# Patient Record
Sex: MALE | Race: WHITE | Employment: FULL TIME | URBAN - METROPOLITAN AREA
[De-identification: names, ages, dates, MRNs, and addresses within clinical notes are randomized per-mention and may not be internally consistent; named-entity substitution may affect disease eponyms.]

---

## 2018-06-05 ENCOUNTER — OFFICE VISIT (OUTPATIENT)
Dept: FAMILY MEDICINE CLINIC | Age: 43
End: 2018-06-05

## 2018-06-05 VITALS
OXYGEN SATURATION: 97 % | SYSTOLIC BLOOD PRESSURE: 144 MMHG | TEMPERATURE: 98.4 F | RESPIRATION RATE: 19 BRPM | HEART RATE: 71 BPM | WEIGHT: 253.9 LBS | HEIGHT: 77 IN | BODY MASS INDEX: 29.98 KG/M2 | DIASTOLIC BLOOD PRESSURE: 101 MMHG

## 2018-06-05 DIAGNOSIS — F43.23 ADJUSTMENT REACTION WITH ANXIETY AND DEPRESSION: Primary | ICD-10-CM

## 2018-06-05 DIAGNOSIS — E55.9 VITAMIN D DEFICIENCY: ICD-10-CM

## 2018-06-05 DIAGNOSIS — C80.1 CANCER (HCC): ICD-10-CM

## 2018-06-05 DIAGNOSIS — I10 ESSENTIAL HYPERTENSION: ICD-10-CM

## 2018-06-05 PROBLEM — K21.9 GERD (GASTROESOPHAGEAL REFLUX DISEASE): Status: ACTIVE | Noted: 2018-06-05

## 2018-06-05 PROBLEM — E78.00 HYPERCHOLESTEROLEMIA: Status: ACTIVE | Noted: 2018-06-05

## 2018-06-05 RX ORDER — BUPROPION HYDROCHLORIDE 150 MG/1
150 TABLET, EXTENDED RELEASE ORAL 2 TIMES DAILY
Qty: 60 TAB | Refills: 0 | Status: SHIPPED | OUTPATIENT
Start: 2018-06-05 | End: 2018-07-13 | Stop reason: SDUPTHER

## 2018-06-05 RX ORDER — IBUPROFEN 200 MG
400 TABLET ORAL
COMMUNITY
End: 2018-07-27 | Stop reason: ALTCHOICE

## 2018-06-05 RX ORDER — ERGOCALCIFEROL 1.25 MG/1
50000 CAPSULE ORAL
Qty: 5 CAP | Refills: 2 | Status: SHIPPED | OUTPATIENT
Start: 2018-06-05 | End: 2018-06-29 | Stop reason: SDUPTHER

## 2018-06-05 RX ORDER — BISMUTH SUBSALICYLATE 262 MG
1 TABLET,CHEWABLE ORAL DAILY
COMMUNITY

## 2018-06-05 RX ORDER — LISINOPRIL 10 MG/1
TABLET ORAL DAILY
COMMUNITY
End: 2018-06-07 | Stop reason: SDUPTHER

## 2018-06-05 NOTE — PROGRESS NOTES
Chief Complaint   Patient presents with    New Patient     Rm 6: to establish care Pt has basic medical questions        HPI:  Mayank Castellanos is a 40y.o. year old male who is a new patient and is here to establish care. He was previously followed by Wade Hernandez. HTN:  He is hypertensive in the office today but reports he has not been taking his Lisinopril. Depression/Anxiety:  He has had many significant life stressors over the past approximately 10 years. He feels that his depression and anxiety started with the diagnosis of cancer. He has had recurrences of this. He took something for anxiety but does not recall the name of it. He currently has not been able to work x 2-3 weeks d/t his depressive symptoms. His current employer is [employer] and he is a . He presents today to discuss FMLA and for treatment options. He also reports he and his ex-wife had a very difficult divorce. She is currently struggling with substance abuse. She recently was in town and stirred up many stressors for he and his two children, 14 yo son, Avni Frazier, 17 yo daughter, Elmo Long. He has never been hospitalized for psychiatric disorder. He currently denies SI or HI. His support system includes his girlfriend. He feels that his depression is the most prominent issue at this time. He does have a therapist but he has not seen his therapist 2-3 years ago. He tried to call last week and was referred to another office that has yet to return his call.       PHQ over the last two weeks 6/5/2018   Little interest or pleasure in doing things Nearly every day   Feeling down, depressed or hopeless Nearly every day   Total Score PHQ 2 6   Trouble falling or staying asleep, or sleeping too much Nearly every day   Feeling tired or having little energy Nearly every day   Poor appetite or overeating Nearly every day   Feeling bad about yourself - or that you are a failure or have let yourself or your family down Nearly every day   Trouble concentrating on things such as school, work, reading or watching TV Nearly every day   Moving or speaking so slowly that other people could have noticed; or the opposite being so fidgety that others notice More than half the days   Thoughts of being better off dead, or hurting yourself in some way Several days   PHQ 9 Score 24   How difficult have these problems made it for you to do your work, take care of your home and get along with others Very difficult     ADD:   He reports that he was changed to [facility]anco ER 37.5mg on 4/19/2018. The first month went well but the second month he developed significant insomnia. He used to take Adderall XR 20mg but this was not effective. He felt like it would wear off too soon. He was taking Ritalin for this in childhood. Testicular Cancer:  He was diagnosed in 2010. After surgical intervention they diagnosed him stage 3 with + lymph involvement. He was then started on chemo shortly thereafter with Cisplatin. He had 6 months of chemo. He also had radiation after chemo for approximately 3 months. The following sections were reviewed & updated as appropriate: PMH, PSH, PL, FMH,  and SH.     Past Medical History:   Diagnosis Date    Adjustment reaction with anxiety and depression     Cancer ([facility] [address] 2010    Testicular    Depression     GERD (gastroesophageal reflux disease)     Hypercholesterolemia     Hypertension     Vitamin D deficiency      Past Surgical History:   Procedure Laterality Date    HX ORCHIECTOMY Right 2010    HX WISDOM TEETH EXTRACTION  1996       Patient Active Problem List   Diagnosis Code    Adjustment reaction with anxiety and depression F43.23    Vitamin D deficiency E55.9    Hypercholesterolemia E78.00    Hypertension I10    GERD (gastroesophageal reflux disease) K21.9      Family History   Problem Relation Age of Onset    Cancer Mother      Ovarian    Hypertension Mother     Cancer Father      Throat    Hypertension Father     Arrhythmia Brother      Afib    No Known Problems Maternal Grandmother     No Known Problems Maternal Grandfather     No Known Problems Paternal Grandmother     No Known Problems Paternal Grandfather        Social History     Social History    Marital status: UNKNOWN     Spouse name: N/A    Number of children: N/A    Years of education: N/A     Occupational History    Not on file. Social History Main Topics    Smoking status: Former Smoker     Packs/day: 1.00     Years: 8.00     Types: Cigarettes     Quit date: 2008    Smokeless tobacco: Never Used    Alcohol use 6.0 oz/week     10 Cans of beer per week    Drug use: No    Sexual activity: Yes     Partners: Female     Birth control/ protection: Condom     Other Topics Concern    Not on file     Social History Narrative    No narrative on file       Prior to Admission medications    Medication Sig Start Date End Date Taking? Authorizing Provider   lisinopril (PRINIVIL, ZESTRIL) 10 mg tablet Take  by mouth daily. Yes Historical Provider   dextroamphetamine-amphetamine (MYDAYIS) 37.5 mg CT24 Take  by mouth. Yes Historical Provider   multivitamin (ONE A DAY) tablet Take 1 Tab by mouth daily. Yes Historical Provider   ibuprofen (MOTRIN) 200 mg tablet Take 400 mg by mouth every eight (8) hours as needed for Pain. Yes Historical Provider   ergocalciferol (ERGOCALCIFEROL) 50,000 unit capsule Take 1 Cap by mouth every seven (7) days. 6/5/18  Yes Joshua Franco NP   buPROPion SR [facility] SR) 150 mg SR tablet Take 1 Tab by mouth two (2) times a day. 6/5/18  Yes Joshua Franco NP        Not on File       Review of Systems  A comprehensive review of systems was negative except for that written in the HPI.     Objective:       Visit Vitals    BP (!) 144/101 (BP 1 Location: Left arm, BP Patient Position: Sitting)    Pulse 71    Temp 98.4 °F (36.9 °C) (Temporal)    Resp 19    Ht 6' 4.5\" (1.943 m)    Wt 253 lb 14.4 oz (115.2 kg)    SpO2 97%    BMI 30.5 kg/m2       Physical Exam  Gen: alert, oriented, no acute distress  Head: normocephalic, atraumatic  Ears: external auditory canals clear, TMs without erythema or effusion  Eyes: pupils equal round reactive to light, sclera clear, conjunctiva clear  Oral: moist mucus membranes, no oral lesions, no pharyngeal inflammation or exudate  Neck: symmetric normal sized thyroid, no carotid bruits, no jugular vein distention  Resp: no increase work of breathing, lungs clear to ausculation bilaterally, no wheezing, rales or rhonchi  CV: S1, S2 normal.  No murmurs, rubs, or gallops. Abd: soft, not tender, not distended. No hepatosplenomegaly. Normal bowel sounds. No hernias. No abdominal or renal bruits. Neuro: cranial nerves intact, normal strength and movement in all extremities, reflexes and sensation intact and symmetric. Skin: no lesion or rash  Extremities: no cyanosis or edema    Assessment & Plan:    ICD-10-CM ICD-9-CM    1. Adjustment reaction with anxiety and depression F43.23 309.28 buPROPion SR (WELLBUTRIN SR) 150 mg SR tablet      REFERRAL TO PSYCHOLOGY   2. Vitamin D deficiency E55.9 268.9 ergocalciferol (ERGOCALCIFEROL) 50,000 unit capsule   3. Cancer (HCC) C80.1 199.1    4. Essential hypertension I10 401.9      Follow-up Disposition:  Return in about 3 days (around 6/8/2018) for Medication Check, Depression, Anxiety, ADHD/ADD, Vitamin D deficiency. lab results and schedule of future lab studies reviewed with patient  reviewed diet, exercise and weight control  reviewed medications and side effects in detail    Differential diagnosis and treatment options reviewed with patient who is in agreement with treatment plan as outlined below. Health Maintenance reviewed - deferred to next visit, awaiting records. Recommended healthy diet low in carbohydrates, fats, sodium and cholesterol.   Recommended regular cardiovascular exercise 3-6 times per week for 30-60 minutes daily.      Chart is reviewed and updated today in the office. Records requested for other providers patient has seen and is currently seeing. Patient was offered a choice/choices in the treatment plan today. Patient expresses understanding of the plan and agrees with recommendations. Verbal and written instructions (see AVS) provided. See patient instructions for more. Patient expresses understanding of diagnosis and treatment plan.

## 2018-06-05 NOTE — PROGRESS NOTES
Rafael Valdivia  Identified pt with two pt identifiers(name and ). Chief Complaint   Patient presents with    New Patient     Rm 6: to establish care Pt has basic medical questions        1. Have you been to the ER, urgent care clinic since your last visit? Hospitalized since your last visit? no    2. Have you seen or consulted any other health care providers outside of the [address] [place] [address] since your last visit? Include any pap smears or colon screening. no    Today's provider has been notified of reason for visit, vitals and flowsheets obtained on patients. Patient received paperwork for advance directive during previous visit but has not completed at this time     Reviewed record In preparation for visit, huddled with provider and have obtained necessary documentation      Health Maintenance Due   Topic    DTaP/Tdap/Td series (1 - Tdap)       Wt Readings from Last 3 Encounters:   18 253 lb 14.4 oz (115.2 kg)     Temp Readings from Last 3 Encounters:   18 98.4 °F (36.9 °C) (Temporal)     BP Readings from Last 3 Encounters:   18 (!) 144/101     Pulse Readings from Last 3 Encounters:   18 71     Vitals:    18 1703   BP: (!) 144/101   Pulse: 71   Resp: 19   Temp: 98.4 °F (36.9 °C)   TempSrc: Temporal   SpO2: 97%   Weight: 253 lb 14.4 oz (115.2 kg)   Height: 6' 4.5\" (1.943 m)   PainSc:   0 - No pain         Learning Assessment:  :     No flowsheet data found. Depression Screening:  :     PHQ over the last two weeks 2018   Little interest or pleasure in doing things Not at all   Feeling down, depressed or hopeless Not at all   Total Score PHQ 2 0       Fall Risk Assessment:  :     No flowsheet data found. Abuse Screening:  :     No flowsheet data found.     ADL Screening:  :     ADL Assessment 2018   Feeding yourself No Help Needed   Getting from bed to chair No Help Needed   Getting dressed No Help Needed   Bathing or showering No Help Needed   Walk across the room (includes cane/walker) No Help Needed   Using the telphone No Help Needed   Taking your medications No Help Needed   Preparing meals No Help Needed   Managing money (expenses/bills) No Help Needed   Moderately strenuous housework (laundry) No Help Needed   Shopping for personal items (toiletries/medicines) No Help Needed   Shopping for groceries No Help Needed   Driving No Help Needed   Climbing a flight of stairs No Help Needed   Getting to places beyond walking distances No Help Needed                 Medication reconciliation up to date and corrected with patient at this time.

## 2018-06-05 NOTE — MR AVS SNAPSHOT
[address] [place] [address] 
[address] [place]ins [address] 
[phone] Patient: Tish Dalal MRN: [id number] :[id number] Visit Information Date & Time Provider Department Dept. Phone Encounter #  
 2018  4:00 PM Leo Velazquez NP [facility] [phone] [id number] Follow-up Instructions Return in about 3 days (around 2018) for Medication Check, Depression, Anxiety, ADHD/ADD, Vitamin D deficiency. Upcoming Health Maintenance Date Due DTaP/Tdap/Td series (1 - Tdap) 11/10/1997 Influenza Age 5 to Adult 2018 Allergies as of 2018  Review Complete On: 2018 By: Leo Velazquez NP Not on File Current Immunizations  Reviewed on 2018 No immunizations on file. Reviewed by Hung Chrsitensen LPN on 1673 at  7:08 PM  
You Were Diagnosed With   
  
 Codes Comments Adjustment reaction with anxiety and depression    -  Primary ICD-10-CM: F43.23 
ICD-9-CM: 309.28 Vitamin D deficiency     ICD-10-CM: E55.9 ICD-9-CM: 268.9 Cancer [facility])     ICD-10-CM: C80.1 ICD-9-CM: 199.1 Essential hypertension     ICD-10-CM: I10 
ICD-9-CM: 401.9 Vitals BP Pulse Temp Resp Height(growth percentile) (!) 144/101 (BP 1 Location: Left arm, BP Patient Position: Sitting) 71 98.4 °F (36.9 °C) (Temporal) 19 6' 4.5\" (1.943 m) Weight(growth percentile) SpO2 BMI Smoking Status 253 lb 14.4 oz (115.2 kg) 97% 30.5 kg/m2 Former Smoker BMI and BSA Data Body Mass Index Body Surface Area 30.5 kg/m 2 2.49 m 2 Preferred Pharmacy Pharmacy Name Phone [facility] [address] AT  Eleni Tirado  [phone] Your Updated Medication List  
  
   
This list is accurate as of 18  7:33 PM.  Always use your most recent med list.  
  
  
  
  
 buPROPion  mg SR tablet Commonly known as:  Lisa Hogan Take 1 Tab by mouth two (2) times a day. ergocalciferol 50,000 unit capsule Commonly known as:  ERGOCALCIFEROL Take 1 Cap by mouth every seven (7) days. ibuprofen 200 mg tablet Commonly known as:  MOTRIN Take 400 mg by mouth every eight (8) hours as needed for Pain. lisinopril 10 mg tablet Commonly known as:  Celeste Sandhya Take  by mouth daily. multivitamin tablet Commonly known as:  ONE A DAY Take 1 Tab by mouth daily. MYDAYIS 37.5 mg Ct24 Generic drug:  dextroamphetamine-amphetamine Take  by mouth. Prescriptions Sent to Pharmacy Refills  
 ergocalciferol (ERGOCALCIFEROL) 50,000 unit capsule 2 Sig: Take 1 Cap by mouth every seven (7) days. Class: Normal  
 Pharmacy: [facility] [employer] [place] [address] Ph #: [phone] Route: Oral  
 buPROPion SR (WELLBUTRIN SR) 150 mg SR tablet 0 Sig: Take 1 Tab by mouth two (2) times a day. Class: Normal  
 Pharmacy: [facility] [employer] [facility] [place] [address] Ph #: [phone] Route: Oral  
  
We Performed the Following REFERRAL TO PSYCHOLOGY [TDA01 Custom] Comments:  
 Noemi Wadsworth [employer] [employer] 
[address] [place] [address] [address] [facility], [address] [facility] 
[phone] Follow-up Instructions Return in about 3 days (around 6/8/2018) for Medication Check, Depression, Anxiety, ADHD/ADD, Vitamin D deficiency. Referral Information Referral ID Referred By Referred To  
  
 [id number] Christel Tavarez Not Available Visits Status Start Date End Date 1 New Request 6/5/18 6/5/19 If your referral has a status of pending review or denied, additional information will be sent to support the outcome of this decision. Patient Instructions Adjustment Disorder: Care Instructions Your Care Instructions Adjustment disorder means that you have emotional or behavioral problems because of stress. But your response to the stress is far more severe than a normal response. It is severe enough to affect your work or social life and may cause depression and physical pains and problems. Events that may cause this response can include a divorce, money problems, or starting school or a new job. It might be anything that causes some stress. This disorder is most often a short-term problem. It happens within 3 months of the stressful event or change. If the response lasts longer than 6 months after the event ends, you may have a more serious disorder. Follow-up care is a key part of your treatment and safety. Be sure to make and go to all appointments, and call your doctor if you are having problems. It's also a good idea to know your test results and keep a list of the medicines you take. How can you care for yourself at home? · Go to all counseling sessions. Do not skip any because you are feeling better. · If your doctor prescribed medicines, take them exactly as prescribed. Call your doctor if you think you are having a problem with your medicine. You will get more details on the specific medicines your doctor prescribes. · Discuss the causes of your stress with a good friend or family member. Or you can join a support group for people with similar problems. Talking to others sometimes relieves stress. · Get at least 30 minutes of exercise on most days of the week. Walking is a good choice. You also may want to do other activities, such as running, swimming, cycling, or playing tennis or team sports. Relaxation techniques Do relaxation exercises 10 to 20 minutes a day. You can play soothing, relaxing music while you do them, if you wish. · Tell others in your house that you are going to do your relaxation exercises. Ask them not to disturb you. · Find a comfortable, quiet place. · Lie down on your back, or sit with your back straight. · Focus on your breathing. Make it slow and steady. · Breathe in through your nose. Breathe out through either your nose or mouth. · Breathe deeply, filling up the area between your navel and your rib cage. Breathe so that your belly goes up and down. · Do not hold your breath. · Breathe like this for 5 to 10 minutes. Notice the feeling of calmness throughout your whole body. As you continue to breathe slowly and deeply, relax by doing these next steps for another 5 to 10 minutes: · Tighten and relax each muscle group in your body. Start at your toes, and work your way up to your head. · Imagine your muscle groups relaxing and getting heavy. · Empty your mind of all thoughts. · Let yourself relax more and more deeply. · Be aware of the state of calmness that surrounds you. · When your relaxation time is over, you can bring yourself back to alertness by moving your fingers and toes. Then move your hands and feet. And then move your entire body. Sometimes people fall asleep during relaxation. But they most often wake up soon. · Always give yourself time to return to full alertness before you drive a car. Wait to do anything that might cause an accident if you are not fully alert. Never play a relaxation tape while you drive a car. When should you call for help? Call 911 anytime you think you may need emergency care. For example, call if: 
? · You feel you cannot stop from hurting yourself or someone else. Keep the numbers for these national suicide hotlines: 5-393-522-TALK (3-706.854.6216) and 0-673-WKBHSLC (1-413.718.9040). If you or someone you know talks about suicide or feeling hopeless, get help right away. ? Watch closely for changes in your health, and be sure to contact your doctor if: 
? · You have new anxiety, or your anxiety gets worse.   
? · You have been feeling sad, depressed, or hopeless or have lost interest in things that you usually enjoy. ? · You do not get better as expected. Where can you learn more? Go to http://charissa-maria elena.info/. Enter [id number] in the search box to learn more about \"Adjustment Disorder: Care Instructions. \" Current as of: July 26, 2016 Content Version: 11.4 © 5241-8102 [employer]. Care instructions adapted under license by [employer] (which disclaims liability or warranty for this information). If you have questions about a medical condition or this instruction, always ask your healthcare professional. [place] [address] any warranty or liability for your use of this information. High Blood Pressure: Care Instructions Your Care Instructions If your blood pressure is usually above 140/90, you have high blood pressure, or hypertension. That means the top number is 140 or higher or the bottom number is 90 or higher, or both. Despite what a lot of people think, high blood pressure usually doesn't cause headaches or make you feel dizzy or lightheaded. It usually has no symptoms. But it does increase your risk for heart attack, stroke, and kidney or eye damage. The higher your blood pressure, the more your risk increases. Your doctor will give you a goal for your blood pressure. Your goal will be based on your health and your age. An example of a goal is to keep your blood pressure below 140/90. Lifestyle changes, such as eating healthy and being active, are always important to help lower blood pressure. You might also take medicine to reach your blood pressure goal. 
Follow-up care is a key part of your treatment and safety. Be sure to make and go to all appointments, and call your doctor if you are having problems. It's also a good idea to know your test results and keep a list of the medicines you take. How can you care for yourself at home? Medical treatment · If you stop taking your medicine, your blood pressure will go back up. You may take one or more types of medicine to lower your blood pressure. Be safe with medicines. Take your medicine exactly as prescribed. Call your doctor if you think you are having a problem with your medicine. · Talk to your doctor before you start taking aspirin every day. Aspirin can help certain people lower their risk of a heart attack or stroke. But taking aspirin isn't right for everyone, because it can cause serious bleeding. · See your doctor regularly. You may need to see the doctor more often at first or until your blood pressure comes down. · If you are taking blood pressure medicine, talk to your doctor before you take decongestants or anti-inflammatory medicine, such as ibuprofen. Some of these medicines can raise blood pressure. · Learn how to check your blood pressure at home. Lifestyle changes · Stay at a healthy weight. This is especially important if you put on weight around the waist. Losing even 10 pounds can help you lower your blood pressure. · If your doctor recommends it, get more exercise. Walking is a good choice. Bit by bit, increase the amount you walk every day. Try for at least 30 minutes on most days of the week. You also may want to swim, bike, or do other activities. · Avoid or limit alcohol. Talk to your doctor about whether you can drink any alcohol. · Try to limit how much sodium you eat to less than 2,300 milligrams (mg) a day. Your doctor may ask you to try to eat less than 1,500 mg a day. · Eat plenty of fruits (such as bananas and oranges), vegetables, legumes, whole grains, and low-fat dairy products. · Lower the amount of saturated fat in your diet. Saturated fat is found in animal products such as milk, cheese, and meat. Limiting these foods may help you lose weight and also lower your risk for heart disease. · Do not smoke. Smoking increases your risk for heart attack and stroke. If you need help quitting, talk to your doctor about stop-smoking programs and medicines. These can increase your chances of quitting for good. When should you call for help? Call 911 anytime you think you may need emergency care. This may mean having symptoms that suggest that your blood pressure is causing a serious heart or blood vessel problem. Your blood pressure may be over 180/110. ? For example, call 911 if: 
? · You have symptoms of a heart attack. These may include: ¨ Chest pain or pressure, or a strange feeling in the chest. 
¨ Sweating. ¨ Shortness of breath. ¨ Nausea or vomiting. ¨ Pain, pressure, or a strange feeling in the back, neck, jaw, or upper belly or in one or both shoulders or arms. ¨ Lightheadedness or sudden weakness. ¨ A fast or irregular heartbeat. ? · You have symptoms of a stroke. These may include: 
¨ Sudden numbness, tingling, weakness, or loss of movement in your face, arm, or leg, especially on only one side of your body. ¨ Sudden vision changes. ¨ Sudden trouble speaking. ¨ Sudden confusion or trouble understanding simple statements. ¨ Sudden problems with walking or balance. ¨ A sudden, severe headache that is different from past headaches. ? · You have severe back or belly pain. ?Do not wait until your blood pressure comes down on its own. Get help right away. ?Call your doctor now or seek immediate care if: 
? · Your blood pressure is much higher than normal (such as 180/110 or higher), but you don't have symptoms. ? · You think high blood pressure is causing symptoms, such as: ¨ Severe headache. ¨ Blurry vision. ? Watch closely for changes in your health, and be sure to contact your doctor if: 
? · Your blood pressure measures 140/90 or higher at least 2 times. That means the top number is 140 or higher or the bottom number is 90 or higher, or both. ? · You think you may be having side effects from your blood pressure medicine. ? · Your blood pressure is usually normal, but it goes above normal at least 2 times. Where can you learn more? Go to http://charissa-maria elena.info/. Enter C882 in the search box to learn more about \"High Blood Pressure: Care Instructions. \" Current as of: September 21, 2016 Content Version: 11.4 © 2861-8062 [employer]. Care instructions adapted under license by [employer] (which disclaims liability or warranty for this information). If you have questions about a medical condition or this instruction, always ask your healthcare professional. [place] [address] any warranty or liability for your use of this information. It is important to learn about how to keep safe from harming yourself. Recognize the warning signs: 
- abrupt changes in personality - Giving away possessions - Use of drugs and/or alcohol 
- Change in eating patterns significant weight changes - Change in sleeping patterns unable to sleep or sleeping all the time - Unwillingness or inability to communicate - Depression - Unusual sadness, discouragement and loneliness - Talk of wanting to die - Neglect of personal appearance - Rebelliousness reckless behavior - Withdrawal from people/activities they love - Confusion inability to concentrate. If you or a loved one observes any of these behaviors or has concerns about self-harm, here s what you can do - Talk about it--your feelings and reasons for harming yourself - Remove any means that you might use to hurt yourself (example pills, rope, extension cords, firearm) - Get professional help from the community ([place] [address] and Substance Abuse, psychological counseling, etc.) - Do not be alone; call your safe contact . Someone whom you trust who will be there for you. Call the toll free [place] 812: 1-090-352-TALK (7912) - [facility]: 4-723-SVGJPYH (243-9886) Introducing [facility]! Willadean Saint introduces [employer] patient portal. Now you can access parts of your medical record, email your doctor's office, and request medication refills online. 1. In your internet browser, go to [email or web address] 2. Click on the First Time User? Click Here link in the Sign In box. You will see the New Member Sign Up page. 3. Enter your [employer] Access Code exactly as it appears below. You will not need to use this code after youve completed the sign-up process. If you do not sign up before the expiration date, you must request a new code. · [employer] Access Code: [email or web address] Expires: 9/3/2018  7:33 PM 
 
4. Enter the last four digits of your Social Security Number (xxxx) and Date of Birth (mm/dd/yyyy) as indicated and click Submit. You will be taken to the next sign-up page. 5. Create a [employer] ID. This will be your [employer] login ID and cannot be changed, so think of one that is secure and easy to remember. 6. Create a [employer] password. You can change your password at any time. 7. Enter your Password Reset Question and Answer. This can be used at a later time if you forget your password. 8. Enter your e-mail address. You will receive e-mail notification when new information is available in [address] 9. Click Sign Up. You can now view and download portions of your medical record. 10. Click the Download Summary menu link to download a portable copy of your medical information. If you have questions, please visit the Frequently Asked Questions section of the [employer] website. Remember, [employer] is NOT to be used for urgent needs. For medical emergencies, dial 911. Now available from your iPhone and Android! Please provide this summary of care documentation to your next provider. Your primary care clinician is listed as [place]. Ana Deleon.  If you have any questions after today's visit, please call [phone].

## 2018-06-05 NOTE — PATIENT INSTRUCTIONS
Adjustment Disorder: Care Instructions  Your Care Instructions    Adjustment disorder means that you have emotional or behavioral problems because of stress. But your response to the stress is far more severe than a normal response. It is severe enough to affect your work or social life and may cause depression and physical pains and problems. Events that may cause this response can include a divorce, money problems, or starting school or a new job. It might be anything that causes some stress. This disorder is most often a short-term problem. It happens within 3 months of the stressful event or change. If the response lasts longer than 6 months after the event ends, you may have a more serious disorder. Follow-up care is a key part of your treatment and safety. Be sure to make and go to all appointments, and call your doctor if you are having problems. It's also a good idea to know your test results and keep a list of the medicines you take. How can you care for yourself at home? · Go to all counseling sessions. Do not skip any because you are feeling better. · If your doctor prescribed medicines, take them exactly as prescribed. Call your doctor if you think you are having a problem with your medicine. You will get more details on the specific medicines your doctor prescribes. · Discuss the causes of your stress with a good friend or family member. Or you can join a support group for people with similar problems. Talking to others sometimes relieves stress. · Get at least 30 minutes of exercise on most days of the week. Walking is a good choice. You also may want to do other activities, such as running, swimming, cycling, or playing tennis or team sports. Relaxation techniques  Do relaxation exercises 10 to 20 minutes a day. You can play soothing, relaxing music while you do them, if you wish. · Tell others in your house that you are going to do your relaxation exercises.  Ask them not to disturb you.  · Find a comfortable, quiet place. · Lie down on your back, or sit with your back straight. · Focus on your breathing. Make it slow and steady. · Breathe in through your nose. Breathe out through either your nose or mouth. · Breathe deeply, filling up the area between your navel and your rib cage. Breathe so that your belly goes up and down. · Do not hold your breath. · Breathe like this for 5 to 10 minutes. Notice the feeling of calmness throughout your whole body. As you continue to breathe slowly and deeply, relax by doing these next steps for another 5 to 10 minutes:  · Tighten and relax each muscle group in your body. Start at your toes, and work your way up to your head. · Imagine your muscle groups relaxing and getting heavy. · Empty your mind of all thoughts. · Let yourself relax more and more deeply. · Be aware of the state of calmness that surrounds you. · When your relaxation time is over, you can bring yourself back to alertness by moving your fingers and toes. Then move your hands and feet. And then move your entire body. Sometimes people fall asleep during relaxation. But they most often wake up soon. · Always give yourself time to return to full alertness before you drive a car. Wait to do anything that might cause an accident if you are not fully alert. Never play a relaxation tape while you drive a car. When should you call for help? Call 911 anytime you think you may need emergency care. For example, call if:  ? · You feel you cannot stop from hurting yourself or someone else. Keep the numbers for these national suicide hotlines: 8-644-381-TALK (2-143-961-5519) and 1-360-IYROARN (3-611-389--553-499-6475). If you or someone you know talks about suicide or feeling hopeless, get help right away. ? Watch closely for changes in your health, and be sure to contact your doctor if:  ? · You have new anxiety, or your anxiety gets worse.    ? · You have been feeling sad, depressed, or hopeless or have lost interest in things that you usually enjoy. ? · You do not get better as expected. Where can you learn more? Go to http://charissa-maria elena.info/. Enter 0688 [id number] in the search box to learn more about \"Adjustment Disorder: Care Instructions. \"  Current as of: July 26, 2016  Content Version: 11.4  © 8067-8397 [employer]. Care instructions adapted under license by [employer] (which disclaims liability or warranty for this information). If you have questions about a medical condition or this instruction, always ask your healthcare professional. [place] [address] any warranty or liability for your use of this information. High Blood Pressure: Care Instructions  Your Care Instructions    If your blood pressure is usually above 140/90, you have high blood pressure, or hypertension. That means the top number is 140 or higher or the bottom number is 90 or higher, or both. Despite what a lot of people think, high blood pressure usually doesn't cause headaches or make you feel dizzy or lightheaded. It usually has no symptoms. But it does increase your risk for heart attack, stroke, and kidney or eye damage. The higher your blood pressure, the more your risk increases. Your doctor will give you a goal for your blood pressure. Your goal will be based on your health and your age. An example of a goal is to keep your blood pressure below 140/90. Lifestyle changes, such as eating healthy and being active, are always important to help lower blood pressure. You might also take medicine to reach your blood pressure goal.  Follow-up care is a key part of your treatment and safety. Be sure to make and go to all appointments, and call your doctor if you are having problems. It's also a good idea to know your test results and keep a list of the medicines you take. How can you care for yourself at home?   Medical treatment  · If you stop taking your medicine, your blood pressure will go back up. You may take one or more types of medicine to lower your blood pressure. Be safe with medicines. Take your medicine exactly as prescribed. Call your doctor if you think you are having a problem with your medicine. · Talk to your doctor before you start taking aspirin every day. Aspirin can help certain people lower their risk of a heart attack or stroke. But taking aspirin isn't right for everyone, because it can cause serious bleeding. · See your doctor regularly. You may need to see the doctor more often at first or until your blood pressure comes down. · If you are taking blood pressure medicine, talk to your doctor before you take decongestants or anti-inflammatory medicine, such as ibuprofen. Some of these medicines can raise blood pressure. · Learn how to check your blood pressure at home. Lifestyle changes  · Stay at a healthy weight. This is especially important if you put on weight around the waist. Losing even 10 pounds can help you lower your blood pressure. · If your doctor recommends it, get more exercise. Walking is a good choice. Bit by bit, increase the amount you walk every day. Try for at least 30 minutes on most days of the week. You also may want to swim, bike, or do other activities. · Avoid or limit alcohol. Talk to your doctor about whether you can drink any alcohol. · Try to limit how much sodium you eat to less than 2,300 milligrams (mg) a day. Your doctor may ask you to try to eat less than 1,500 mg a day. · Eat plenty of fruits (such as bananas and oranges), vegetables, legumes, whole grains, and low-fat dairy products. · Lower the amount of saturated fat in your diet. Saturated fat is found in animal products such as milk, cheese, and meat. Limiting these foods may help you lose weight and also lower your risk for heart disease. · Do not smoke. Smoking increases your risk for heart attack and stroke.  If you need help quitting, talk to your doctor about stop-smoking programs and medicines. These can increase your chances of quitting for good. When should you call for help? Call 911 anytime you think you may need emergency care. This may mean having symptoms that suggest that your blood pressure is causing a serious heart or blood vessel problem. Your blood pressure may be over 180/110. ? For example, call 911 if:  ? · You have symptoms of a heart attack. These may include:  ¨ Chest pain or pressure, or a strange feeling in the chest.  ¨ Sweating. ¨ Shortness of breath. ¨ Nausea or vomiting. ¨ Pain, pressure, or a strange feeling in the back, neck, jaw, or upper belly or in one or both shoulders or arms. ¨ Lightheadedness or sudden weakness. ¨ A fast or irregular heartbeat. ? · You have symptoms of a stroke. These may include:  ¨ Sudden numbness, tingling, weakness, or loss of movement in your face, arm, or leg, especially on only one side of your body. ¨ Sudden vision changes. ¨ Sudden trouble speaking. ¨ Sudden confusion or trouble understanding simple statements. ¨ Sudden problems with walking or balance. ¨ A sudden, severe headache that is different from past headaches. ? · You have severe back or belly pain. ?Do not wait until your blood pressure comes down on its own. Get help right away. ?Call your doctor now or seek immediate care if:  ? · Your blood pressure is much higher than normal (such as 180/110 or higher), but you don't have symptoms. ? · You think high blood pressure is causing symptoms, such as:  ¨ Severe headache. ¨ Blurry vision. ? Watch closely for changes in your health, and be sure to contact your doctor if:  ? · Your blood pressure measures 140/90 or higher at least 2 times. That means the top number is 140 or higher or the bottom number is 90 or higher, or both. ? · You think you may be having side effects from your blood pressure medicine.    ? · Your blood pressure is usually normal, but it goes above normal at least 2 times. Where can you learn more? Go to http://charissa-maria elena.info/. Enter T569 in the search box to learn more about \"High Blood Pressure: Care Instructions. \"  Current as of: September 21, 2016  Content Version: 11.4  © 7330-7526 [employer]. Care instructions adapted under license by [employer] (which disclaims liability or warranty for this information). If you have questions about a medical condition or this instruction, always ask your healthcare professional. [place] [address] any warranty or liability for your use of this information. It is important to learn about how to keep safe from harming yourself. Recognize the warning signs:  - abrupt changes in personality  - Giving away possessions  - Use of drugs and/or alcohol  - Change in eating patterns significant weight changes  - Change in sleeping patterns unable to sleep or sleeping all the time  - Unwillingness or inability to communicate  - Depression  - Unusual sadness, discouragement and loneliness  - Talk of wanting to die  - Neglect of personal appearance  - Rebelliousness reckless behavior  - Withdrawal from people/activities they love  - Confusion inability to concentrate. If you or a loved one observes any of these behaviors or has concerns about self-harm, here s what you can do  - Talk about it--your feelings and reasons for harming yourself  - Remove any means that you might use to hurt yourself (example pills, rope, extension cords, firearm)  - Get professional help from the community ([place] [address] and Substance Abuse, psychological counseling, etc.)  - Do not be alone; call your safe contact . Someone whom you trust who will be there for you.     Call the toll free June 23: 0-505-002-TALK ([phone])  - ALENA Hagen 9: [phone] ([phone])

## 2018-06-07 ENCOUNTER — TELEPHONE (OUTPATIENT)
Dept: FAMILY MEDICINE CLINIC | Age: 43
End: 2018-06-07

## 2018-06-07 DIAGNOSIS — F98.8 ADD (ATTENTION DEFICIT DISORDER) WITHOUT HYPERACTIVITY: ICD-10-CM

## 2018-06-07 DIAGNOSIS — I10 ESSENTIAL HYPERTENSION: ICD-10-CM

## 2018-06-07 DIAGNOSIS — F43.23 ADJUSTMENT REACTION WITH ANXIETY AND DEPRESSION: Primary | ICD-10-CM

## 2018-06-07 PROBLEM — F90.0 ATTENTION DEFICIT HYPERACTIVITY DISORDER (ADHD), PREDOMINANTLY INATTENTIVE TYPE: Chronic | Status: ACTIVE | Noted: 2018-06-07

## 2018-06-07 RX ORDER — DEXTROAMPHETAMINE SACCHARATE, AMPHETAMINE ASPARTATE MONOHYDRATE, DEXTROAMPHETAMINE SULFATE AND AMPHETAMINE SULFATE 7.5; 7.5; 7.5; 7.5 MG/1; MG/1; MG/1; MG/1
30 CAPSULE, EXTENDED RELEASE ORAL
Qty: 30 CAP | Refills: 0 | Status: SHIPPED | OUTPATIENT
Start: 2018-06-07 | End: 2018-06-25 | Stop reason: ALTCHOICE

## 2018-06-07 RX ORDER — LISINOPRIL 10 MG/1
10 TABLET ORAL DAILY
Qty: 90 TAB | Refills: 3 | Status: SHIPPED | OUTPATIENT
Start: 2018-06-07 | End: 2018-07-13 | Stop reason: SDUPTHER

## 2018-06-07 RX ORDER — CITALOPRAM 10 MG/1
10 TABLET ORAL DAILY
Qty: 30 TAB | Refills: 1 | Status: SHIPPED | OUTPATIENT
Start: 2018-06-07 | End: 2018-06-12

## 2018-06-07 NOTE — TELEPHONE ENCOUNTER
The patient states that the [place] Gianfranco is still causing significant insomnia despite taking it earlier in the day. He would like to switch back to the Adderall. Because it was ineffective at 20mg, I will increase his dose to 30mg XR. Pt to come and pickup his script today at the office. He also reports his depressive symptoms have worsened, will restart his Celexa at this time and continue with his Wellbutrin titration. Pt also needs refill on his Lisinopril.

## 2018-06-12 ENCOUNTER — OFFICE VISIT (OUTPATIENT)
Dept: FAMILY MEDICINE CLINIC | Age: 43
End: 2018-06-12

## 2018-06-12 VITALS
BODY MASS INDEX: 29.5 KG/M2 | OXYGEN SATURATION: 98 % | WEIGHT: 249.8 LBS | TEMPERATURE: 98.3 F | SYSTOLIC BLOOD PRESSURE: 139 MMHG | HEART RATE: 73 BPM | HEIGHT: 77 IN | RESPIRATION RATE: 18 BRPM | DIASTOLIC BLOOD PRESSURE: 88 MMHG

## 2018-06-12 DIAGNOSIS — F32.1 MODERATE MAJOR DEPRESSION (HCC): Primary | ICD-10-CM

## 2018-06-12 DIAGNOSIS — I10 ESSENTIAL HYPERTENSION: ICD-10-CM

## 2018-06-12 DIAGNOSIS — F98.8 ADD (ATTENTION DEFICIT DISORDER) WITHOUT HYPERACTIVITY: ICD-10-CM

## 2018-06-12 DIAGNOSIS — F43.23 ADJUSTMENT REACTION WITH ANXIETY AND DEPRESSION: ICD-10-CM

## 2018-06-12 DIAGNOSIS — E55.9 VITAMIN D DEFICIENCY: ICD-10-CM

## 2018-06-12 NOTE — PROGRESS NOTES
Elsy Duff  Identified pt with two pt identifiers(name and ). No chief complaint on file. 1. Have you been to the ER, urgent care clinic since your last visit? Hospitalized since your last visit? no    2. Have you seen or consulted any other health care providers outside of the [address] [place] [address] since your last visit? Include any pap smears or colon screening. no    Today's provider has been notified of reason for visit, vitals and flowsheets obtained on patients. Patient received paperwork for advance directive during previous visit but has not completed at this time     Reviewed record In preparation for visit, huddled with provider and have obtained necessary documentation      There are no preventive care reminders to display for this patient.     Wt Readings from Last 3 Encounters:   18 249 lb 12.8 oz (113.3 kg)   18 253 lb 14.4 oz (115.2 kg)   16 240 lb (108.9 kg)     Temp Readings from Last 3 Encounters:   18 98.3 °F (36.8 °C) (Temporal)   18 98.4 °F (36.9 °C) (Temporal)   16 98.4 °F (36.9 °C)     BP Readings from Last 3 Encounters:   18 139/88   18 (!) 144/101   16 159/79     Pulse Readings from Last 3 Encounters:   18 73   18 71   16 71     Vitals:    18 1502   BP: 139/88   Pulse: 73   Resp: 18   Temp: 98.3 °F (36.8 °C)   TempSrc: Temporal   SpO2: 98%   Weight: 249 lb 12.8 oz (113.3 kg)   Height: 6' 4.5\" (1.943 m)   PainSc:   0 - No pain         Learning Assessment:  :     Learning Assessment 2015   PRIMARY LEARNER Patient   PRIMARY LANGUAGE ENGLISH   LEARNER PREFERENCE PRIMARY OTHER (COMMENT)   ANSWERED BY pt   RELATIONSHIP SELF       Depression Screening:  :     PHQ over the last two weeks 2018   Little interest or pleasure in doing things Nearly every day   Feeling down, depressed or hopeless Nearly every day   Total Score PHQ 2 6   Trouble falling or staying asleep, or sleeping too much Nearly every day   Feeling tired or having little energy Nearly every day   Poor appetite or overeating Nearly every day   Feeling bad about yourself - or that you are a failure or have let yourself or your family down Nearly every day   Trouble concentrating on things such as school, work, reading or watching TV Nearly every day   Moving or speaking so slowly that other people could have noticed; or the opposite being so fidgety that others notice More than half the days   Thoughts of being better off dead, or hurting yourself in some way Several days   PHQ 9 Score 24   How difficult have these problems made it for you to do your work, take care of your home and get along with others Very difficult       Fall Risk Assessment:  :     No flowsheet data found. Abuse Screening:  :     No flowsheet data found. ADL Screening:  :     ADL Assessment 6/5/2018   Feeding yourself No Help Needed   Getting from bed to chair No Help Needed   Getting dressed No Help Needed   Bathing or showering No Help Needed   Walk across the room (includes cane/walker) No Help Needed   Using the telphone No Help Needed   Taking your medications No Help Needed   Preparing meals No Help Needed   Managing money (expenses/bills) No Help Needed   Moderately strenuous housework (laundry) No Help Needed   Shopping for personal items (toiletries/medicines) No Help Needed   Shopping for groceries No Help Needed   Driving No Help Needed   Climbing a flight of stairs No Help Needed   Getting to places beyond walking distances No Help Needed                 Medication reconciliation up to date and corrected with patient at this time.

## 2018-06-12 NOTE — PATIENT INSTRUCTIONS
Adjustment Disorder: Care Instructions  Your Care Instructions    Adjustment disorder means that you have emotional or behavioral problems because of stress. But your response to the stress is far more severe than a normal response. It is severe enough to affect your work or social life and may cause depression and physical pains and problems. Events that may cause this response can include a divorce, money problems, or starting school or a new job. It might be anything that causes some stress. This disorder is most often a short-term problem. It happens within 3 months of the stressful event or change. If the response lasts longer than 6 months after the event ends, you may have a more serious disorder. Follow-up care is a key part of your treatment and safety. Be sure to make and go to all appointments, and call your doctor if you are having problems. It's also a good idea to know your test results and keep a list of the medicines you take. How can you care for yourself at home? · Go to all counseling sessions. Do not skip any because you are feeling better. · If your doctor prescribed medicines, take them exactly as prescribed. Call your doctor if you think you are having a problem with your medicine. You will get more details on the specific medicines your doctor prescribes. · Discuss the causes of your stress with a good friend or family member. Or you can join a support group for people with similar problems. Talking to others sometimes relieves stress. · Get at least 30 minutes of exercise on most days of the week. Walking is a good choice. You also may want to do other activities, such as running, swimming, cycling, or playing tennis or team sports. Relaxation techniques  Do relaxation exercises 10 to 20 minutes a day. You can play soothing, relaxing music while you do them, if you wish. · Tell others in your house that you are going to do your relaxation exercises.  Ask them not to disturb you.  · Find a comfortable, quiet place. · Lie down on your back, or sit with your back straight. · Focus on your breathing. Make it slow and steady. · Breathe in through your nose. Breathe out through either your nose or mouth. · Breathe deeply, filling up the area between your navel and your rib cage. Breathe so that your belly goes up and down. · Do not hold your breath. · Breathe like this for 5 to 10 minutes. Notice the feeling of calmness throughout your whole body. As you continue to breathe slowly and deeply, relax by doing these next steps for another 5 to 10 minutes:  · Tighten and relax each muscle group in your body. Start at your toes, and work your way up to your head. · Imagine your muscle groups relaxing and getting heavy. · Empty your mind of all thoughts. · Let yourself relax more and more deeply. · Be aware of the state of calmness that surrounds you. · When your relaxation time is over, you can bring yourself back to alertness by moving your fingers and toes. Then move your hands and feet. And then move your entire body. Sometimes people fall asleep during relaxation. But they most often wake up soon. · Always give yourself time to return to full alertness before you drive a car. Wait to do anything that might cause an accident if you are not fully alert. Never play a relaxation tape while you drive a car. When should you call for help? Call 911 anytime you think you may need emergency care. For example, call if:  ? · You feel you cannot stop from hurting yourself or someone else. Keep the numbers for these national suicide hotlines: 1-367-985-TALK (8-941-919-985.387.9687) and 8-983-LBSGYUP (4-801.564.1296). If you or someone you know talks about suicide or feeling hopeless, get help right away. ? Watch closely for changes in your health, and be sure to contact your doctor if:  ? · You have new anxiety, or your anxiety gets worse.    ? · You have been feeling sad, depressed, or hopeless or have lost interest in things that you usually enjoy. ? · You do not get better as expected. Where can you learn more? Go to http://charissa-maria elena.info/. Enter [id number] [id number] in the search box to learn more about \"Adjustment Disorder: Care Instructions. \"  Current as of: July 26, 2016  Content Version: 11.4  © 9677-4187 [employer]. Care instructions adapted under license by [employer] (which disclaims liability or warranty for this information). If you have questions about a medical condition or this instruction, always ask your healthcare professional. [place] [address] any warranty or liability for your use of this information. High Blood Pressure: Care Instructions  Your Care Instructions    If your blood pressure is usually above 140/90, you have high blood pressure, or hypertension. That means the top number is 140 or higher or the bottom number is 90 or higher, or both. Despite what a lot of people think, high blood pressure usually doesn't cause headaches or make you feel dizzy or lightheaded. It usually has no symptoms. But it does increase your risk for heart attack, stroke, and kidney or eye damage. The higher your blood pressure, the more your risk increases. Your doctor will give you a goal for your blood pressure. Your goal will be based on your health and your age. An example of a goal is to keep your blood pressure below 140/90. Lifestyle changes, such as eating healthy and being active, are always important to help lower blood pressure. You might also take medicine to reach your blood pressure goal.  Follow-up care is a key part of your treatment and safety. Be sure to make and go to all appointments, and call your doctor if you are having problems. It's also a good idea to know your test results and keep a list of the medicines you take. How can you care for yourself at home?   Medical treatment  · If you stop taking your medicine, your blood pressure will go back up. You may take one or more types of medicine to lower your blood pressure. Be safe with medicines. Take your medicine exactly as prescribed. Call your doctor if you think you are having a problem with your medicine. · Talk to your doctor before you start taking aspirin every day. Aspirin can help certain people lower their risk of a heart attack or stroke. But taking aspirin isn't right for everyone, because it can cause serious bleeding. · See your doctor regularly. You may need to see the doctor more often at first or until your blood pressure comes down. · If you are taking blood pressure medicine, talk to your doctor before you take decongestants or anti-inflammatory medicine, such as ibuprofen. Some of these medicines can raise blood pressure. · Learn how to check your blood pressure at home. Lifestyle changes  · Stay at a healthy weight. This is especially important if you put on weight around the waist. Losing even 10 pounds can help you lower your blood pressure. · If your doctor recommends it, get more exercise. Walking is a good choice. Bit by bit, increase the amount you walk every day. Try for at least 30 minutes on most days of the week. You also may want to swim, bike, or do other activities. · Avoid or limit alcohol. Talk to your doctor about whether you can drink any alcohol. · Try to limit how much sodium you eat to less than 2,300 milligrams (mg) a day. Your doctor may ask you to try to eat less than 1,500 mg a day. · Eat plenty of fruits (such as bananas and oranges), vegetables, legumes, whole grains, and low-fat dairy products. · Lower the amount of saturated fat in your diet. Saturated fat is found in animal products such as milk, cheese, and meat. Limiting these foods may help you lose weight and also lower your risk for heart disease. · Do not smoke. Smoking increases your risk for heart attack and stroke.  If you need help quitting, talk to your doctor about stop-smoking programs and medicines. These can increase your chances of quitting for good. When should you call for help? Call 911 anytime you think you may need emergency care. This may mean having symptoms that suggest that your blood pressure is causing a serious heart or blood vessel problem. Your blood pressure may be over 180/110. ? For example, call 911 if:  ? · You have symptoms of a heart attack. These may include:  ¨ Chest pain or pressure, or a strange feeling in the chest.  ¨ Sweating. ¨ Shortness of breath. ¨ Nausea or vomiting. ¨ Pain, pressure, or a strange feeling in the back, neck, jaw, or upper belly or in one or both shoulders or arms. ¨ Lightheadedness or sudden weakness. ¨ A fast or irregular heartbeat. ? · You have symptoms of a stroke. These may include:  ¨ Sudden numbness, tingling, weakness, or loss of movement in your face, arm, or leg, especially on only one side of your body. ¨ Sudden vision changes. ¨ Sudden trouble speaking. ¨ Sudden confusion or trouble understanding simple statements. ¨ Sudden problems with walking or balance. ¨ A sudden, severe headache that is different from past headaches. ? · You have severe back or belly pain. ?Do not wait until your blood pressure comes down on its own. Get help right away. ?Call your doctor now or seek immediate care if:  ? · Your blood pressure is much higher than normal (such as 180/110 or higher), but you don't have symptoms. ? · You think high blood pressure is causing symptoms, such as:  ¨ Severe headache. ¨ Blurry vision. ? Watch closely for changes in your health, and be sure to contact your doctor if:  ? · Your blood pressure measures 140/90 or higher at least 2 times. That means the top number is 140 or higher or the bottom number is 90 or higher, or both. ? · You think you may be having side effects from your blood pressure medicine.    ? · Your blood pressure is usually normal, but it goes above normal at least 2 times. Where can you learn more? Go to http://charissa-maria elena.info/. Enter B045 in the search box to learn more about \"High Blood Pressure: Care Instructions. \"  Current as of: September 21, 2016  Content Version: 11.4  © 2857-9638 [employer]. Care instructions adapted under license by [employer] (which disclaims liability or warranty for this information). If you have questions about a medical condition or this instruction, always ask your healthcare professional. [address] any warranty or liability for your use of this information.

## 2018-06-12 NOTE — MR AVS SNAPSHOT
[facility] 
 
 
 [address] [facility] 
[address] [place] [address] 
[phone] Patient: Fátima Rosenbaum MRN: [id number] :1975 Visit Information Date & Time Provider Department Dept. Phone Encounter #  
 2018  2:40 PM Alis López NP [facility] [phone] [id number] Follow-up Instructions Return in about 6 days (around 2018). Follow-up and Disposition History Upcoming Health Maintenance Date Due Pneumococcal 19-64 Highest Risk (1 of 3 - PCV13) 2018* DTaP/Tdap/Td series (1 - Tdap) 2019* Influenza Age 5 to Adult 2018 *Topic was postponed. The date shown is not the original due date. Allergies as of 2018  Review Complete On: 2018 By: Alis López NP No Known Allergies Current Immunizations  Reviewed on 2018 No immunizations on file. Not reviewed this visit You Were Diagnosed With   
  
 Codes Comments Moderate major depression ([facility] [address]    -  Primary ICD-10-CM: F32.1 ICD-9-CM: 296.22   
 ADD (attention deficit disorder) without hyperactivity     ICD-10-CM: F98.8 ICD-9-CM: 314.00 Vitamin D deficiency     ICD-10-CM: E55.9 ICD-9-CM: 268.9 Adjustment reaction with anxiety and depression     ICD-10-CM: F43.23 
ICD-9-CM: 309.28 Essential hypertension     ICD-10-CM: I10 
ICD-9-CM: 401.9 Vitals BP Pulse Temp Resp Height(growth percentile) 139/88 (BP 1 Location: Left arm, BP Patient Position: Sitting) 73 98.3 °F (36.8 °C) (Temporal) 18 6' 4.5\" (1.943 m) Weight(growth percentile) SpO2 BMI Smoking Status 249 lb 12.8 oz (113.3 kg) 98% 30.01 kg/m2 Former Smoker BMI and BSA Data Body Mass Index Body Surface Area 30.01 kg/m 2 2.47 m 2 Preferred Pharmacy Pharmacy Name Phone [facility] [address] AT  [address] [phone] Your Updated Medication List  
  
   
This list is accurate as of 6/12/18  4:04 PM.  Always use your most recent med list.  
  
  
  
  
 amphetamine-dextroamphetamine XR 30 mg XR capsule Commonly known as:  ADDERALL XR Take 1 Cap (30 mg total) by mouth every morning. Max Daily Amount: 30 mg  
  
 buPROPion  mg SR tablet Commonly known as:  Pedro Jacobs Take 1 Tab by mouth two (2) times a day. ergocalciferol 50,000 unit capsule Commonly known as:  ERGOCALCIFEROL Take 1 Cap by mouth every seven (7) days. ibuprofen 200 mg tablet Commonly known as:  MOTRIN Take 400 mg by mouth every eight (8) hours as needed for Pain. lisinopril 10 mg tablet Commonly known as:  Tiesha Imperial Take 1 Tab by mouth daily. multivitamin tablet Commonly known as:  ONE A DAY Take 1 Tab by mouth daily. We Performed the Following REFERRAL TO PSYCHIATRY [REF91 Custom] Comments:  
 [facility] Partial Hospitalization Program - appointment for tomorrow morning, 6/13/2018, at 9am  
  
Follow-up Instructions Return in about 6 days (around 6/18/2018). Referral Information Referral ID Referred By Referred To  
  
 [id number] [facility], [address] Phone: [phone] Fax: [phone] Visits Status Start Date End Date 1 New Request 6/12/18 6/12/19 If your referral has a status of pending review or denied, additional information will be sent to support the outcome of this decision. Patient Instructions Adjustment Disorder: Care Instructions Your Care Instructions Adjustment disorder means that you have emotional or behavioral problems because of stress. But your response to the stress is far more severe than a normal response.  It is severe enough to affect your work or social life and may cause depression and physical pains and problems. Events that may cause this response can include a divorce, money problems, or starting school or a new job. It might be anything that causes some stress. This disorder is most often a short-term problem. It happens within 3 months of the stressful event or change. If the response lasts longer than 6 months after the event ends, you may have a more serious disorder. Follow-up care is a key part of your treatment and safety. Be sure to make and go to all appointments, and call your doctor if you are having problems. It's also a good idea to know your test results and keep a list of the medicines you take. How can you care for yourself at home? · Go to all counseling sessions. Do not skip any because you are feeling better. · If your doctor prescribed medicines, take them exactly as prescribed. Call your doctor if you think you are having a problem with your medicine. You will get more details on the specific medicines your doctor prescribes. · Discuss the causes of your stress with a good friend or family member. Or you can join a support group for people with similar problems. Talking to others sometimes relieves stress. · Get at least 30 minutes of exercise on most days of the week. Walking is a good choice. You also may want to do other activities, such as running, swimming, cycling, or playing tennis or team sports. Relaxation techniques Do relaxation exercises 10 to 20 minutes a day. You can play soothing, relaxing music while you do them, if you wish. · Tell others in your house that you are going to do your relaxation exercises. Ask them not to disturb you. · Find a comfortable, quiet place. · Lie down on your back, or sit with your back straight. · Focus on your breathing. Make it slow and steady. · Breathe in through your nose. Breathe out through either your nose or mouth. · Breathe deeply, filling up the area between your navel and your rib cage. Breathe so that your belly goes up and down. · Do not hold your breath. · Breathe like this for 5 to 10 minutes. Notice the feeling of calmness throughout your whole body. As you continue to breathe slowly and deeply, relax by doing these next steps for another 5 to 10 minutes: · Tighten and relax each muscle group in your body. Start at your toes, and work your way up to your head. · Imagine your muscle groups relaxing and getting heavy. · Empty your mind of all thoughts. · Let yourself relax more and more deeply. · Be aware of the state of calmness that surrounds you. · When your relaxation time is over, you can bring yourself back to alertness by moving your fingers and toes. Then move your hands and feet. And then move your entire body. Sometimes people fall asleep during relaxation. But they most often wake up soon. · Always give yourself time to return to full alertness before you drive a car. Wait to do anything that might cause an accident if you are not fully alert. Never play a relaxation tape while you drive a car. When should you call for help? Call 911 anytime you think you may need emergency care. For example, call if: 
? · You feel you cannot stop from hurting yourself or someone else. Keep the numbers for these national suicide hotlines: 3-880-822-TALK (5-113.484.7724) and 6-114-KZFFBNJ (3-887.561.4462). If you or someone you know talks about suicide or feeling hopeless, get help right away. ? Watch closely for changes in your health, and be sure to contact your doctor if: 
? · You have new anxiety, or your anxiety gets worse. ? · You have been feeling sad, depressed, or hopeless or have lost interest in things that you usually enjoy. ? · You do not get better as expected. Where can you learn more? Go to http://charissa-maria elena.info/. Enter [id number] in the search box to learn more about \"Adjustment Disorder: Care Instructions. \" Current as of: July 26, 2016 Content Version: 11.4 © 6375-9660 [employer]. Care instructions adapted under license by [employer] (which disclaims liability or warranty for this information). If you have questions about a medical condition or this instruction, always ask your healthcare professional. [place] [address] any warranty or liability for your use of this information. High Blood Pressure: Care Instructions Your Care Instructions If your blood pressure is usually above 140/90, you have high blood pressure, or hypertension. That means the top number is 140 or higher or the bottom number is 90 or higher, or both. Despite what a lot of people think, high blood pressure usually doesn't cause headaches or make you feel dizzy or lightheaded. It usually has no symptoms. But it does increase your risk for heart attack, stroke, and kidney or eye damage. The higher your blood pressure, the more your risk increases. Your doctor will give you a goal for your blood pressure. Your goal will be based on your health and your age. An example of a goal is to keep your blood pressure below 140/90. Lifestyle changes, such as eating healthy and being active, are always important to help lower blood pressure. You might also take medicine to reach your blood pressure goal. 
Follow-up care is a key part of your treatment and safety. Be sure to make and go to all appointments, and call your doctor if you are having problems. It's also a good idea to know your test results and keep a list of the medicines you take. How can you care for yourself at home? Medical treatment · If you stop taking your medicine, your blood pressure will go back up. You may take one or more types of medicine to lower your blood pressure. Be safe with medicines. Take your medicine exactly as prescribed.  Call your doctor if you think you are having a problem with your medicine. · Talk to your doctor before you start taking aspirin every day. Aspirin can help certain people lower their risk of a heart attack or stroke. But taking aspirin isn't right for everyone, because it can cause serious bleeding. · See your doctor regularly. You may need to see the doctor more often at first or until your blood pressure comes down. · If you are taking blood pressure medicine, talk to your doctor before you take decongestants or anti-inflammatory medicine, such as ibuprofen. Some of these medicines can raise blood pressure. · Learn how to check your blood pressure at home. Lifestyle changes · Stay at a healthy weight. This is especially important if you put on weight around the waist. Losing even 10 pounds can help you lower your blood pressure. · If your doctor recommends it, get more exercise. Walking is a good choice. Bit by bit, increase the amount you walk every day. Try for at least 30 minutes on most days of the week. You also may want to swim, bike, or do other activities. · Avoid or limit alcohol. Talk to your doctor about whether you can drink any alcohol. · Try to limit how much sodium you eat to less than 2,300 milligrams (mg) a day. Your doctor may ask you to try to eat less than 1,500 mg a day. · Eat plenty of fruits (such as bananas and oranges), vegetables, legumes, whole grains, and low-fat dairy products. · Lower the amount of saturated fat in your diet. Saturated fat is found in animal products such as milk, cheese, and meat. Limiting these foods may help you lose weight and also lower your risk for heart disease. · Do not smoke. Smoking increases your risk for heart attack and stroke. If you need help quitting, talk to your doctor about stop-smoking programs and medicines. These can increase your chances of quitting for good. When should you call for help? Call 911 anytime you think you may need emergency care. This may mean having symptoms that suggest that your blood pressure is causing a serious heart or blood vessel problem. Your blood pressure may be over 180/110. ? For example, call 911 if: 
? · You have symptoms of a heart attack. These may include: ¨ Chest pain or pressure, or a strange feeling in the chest. 
¨ Sweating. ¨ Shortness of breath. ¨ Nausea or vomiting. ¨ Pain, pressure, or a strange feeling in the back, neck, jaw, or upper belly or in one or both shoulders or arms. ¨ Lightheadedness or sudden weakness. ¨ A fast or irregular heartbeat. ? · You have symptoms of a stroke. These may include: 
¨ Sudden numbness, tingling, weakness, or loss of movement in your face, arm, or leg, especially on only one side of your body. ¨ Sudden vision changes. ¨ Sudden trouble speaking. ¨ Sudden confusion or trouble understanding simple statements. ¨ Sudden problems with walking or balance. ¨ A sudden, severe headache that is different from past headaches. ? · You have severe back or belly pain. ?Do not wait until your blood pressure comes down on its own. Get help right away. ?Call your doctor now or seek immediate care if: 
? · Your blood pressure is much higher than normal (such as 180/110 or higher), but you don't have symptoms. ? · You think high blood pressure is causing symptoms, such as: ¨ Severe headache. ¨ Blurry vision. ? Watch closely for changes in your health, and be sure to contact your doctor if: 
? · Your blood pressure measures 140/90 or higher at least 2 times. That means the top number is 140 or higher or the bottom number is 90 or higher, or both. ? · You think you may be having side effects from your blood pressure medicine. ? · Your blood pressure is usually normal, but it goes above normal at least 2 times. Where can you learn more? Go to http://charissa-maria elena.info/. Enter F955 in the search box to learn more about \"High Blood Pressure: Care Instructions. \" Current as of: September 21, 2016 Content Version: 11.4 © 4024-1985 [employer]. Care instructions adapted under license by [employer] (which disclaims liability or warranty for this information). If you have questions about a medical condition or this instruction, always ask your healthcare professional. [address] any warranty or liability for your use of this information. Patient Instructions History Introducing [facility]! Topher Miner introduces [employer] patient portal. Now you can access parts of your medical record, email your doctor's office, and request medication refills online. 1. In your internet browser, go to [email or web address] 2. Click on the First Time User? Click Here link in the Sign In box. You will see the New Member Sign Up page. 3. Enter your CoolSystems Access Code exactly as it appears below. You will not need to use this code after youve completed the sign-up process. If you do not sign up before the expiration date, you must request a new code. · [employer] Access Code: [email or web address] Expires: 9/10/2018  4:04 PM 
 
4. Enter the last four digits of your Social Security Number (xxxx) and Date of Birth (mm/dd/yyyy) as indicated and click Submit. You will be taken to the next sign-up page. 5. Create a [employer]s ID. This will be your [employer]s login ID and cannot be changed, so think of one that is secure and easy to remember. 6. Create a [employer] password. You can change your password at any time. 7. Enter your Password Reset Question and Answer. This can be used at a later time if you forget your password. 8. Enter your e-mail address. You will receive e-mail notification when new information is available in [address] 9. Click Sign Up. You can now view and download portions of your medical record. 10. Click the Download Summary menu link to download a portable copy of your medical information. If you have questions, please visit the Frequently Asked Questions section of the [employer] website. Remember, [employer] is NOT to be used for urgent needs. For medical emergencies, dial 911. Now available from your iPhone and Android! Please provide this summary of care documentation to your next provider. Your primary care clinician is listed as Ferny Hilario. Alyssa Bocanegra. If you have any questions after today's visit, please call [phone].

## 2018-06-12 NOTE — PROGRESS NOTES
Chief Complaint   Patient presents with    Depression         HPI:  The patient is a 40 y.o. male who presents today for a follow up appointment. No hospital, ER or specialist visits since last primary care visit except as noted below. HTN:  He was hypertensive in the office last visit and was restarted on his Lisinopril. He is normotensive in the office today. Depression/Anxiety:  He has had many significant life stressors over the past approximately 10 years. He feels that his depression and anxiety started with the diagnosis of cancer. He has had recurrences of this. He took something for anxiety but does not recall the name of it. He currently has not been able to work x 2-3 weeks d/t his depressive symptoms. His current employer is [employer] and he is a . He presents today to discuss FMLA and for treatment options. He also reports he and his ex-wife had a very difficult divorce. She is currently struggling with substance abuse. She recently was in town and stirred up many stressors for he and his two children, 14 yo son, Joann Anton, 15 yo daughter, Tad Kuo. He has never been hospitalized for psychiatric disorder. He currently denies SI or HI. His support system includes his girlfriend. He feels that his depression is the most prominent issue at this time. He does have a therapist but he has not seen his therapist 2-3 years ago. He tried to call last week and was referred to another office that has yet to return his call. He presents today after being on Wellbutrin BID x approximately 1 week. He reports his depressive symptoms have improved. He does not feel Mg Harper like he did before\".     PHQ over the last two weeks 6/5/2018   Little interest or pleasure in doing things Nearly every day   Feeling down, depressed or hopeless Nearly every day   Total Score PHQ 2 6   Trouble falling or staying asleep, or sleeping too much Nearly every day   Feeling tired or having little energy Nearly every day   Poor appetite or overeating Nearly every day   Feeling bad about yourself - or that you are a failure or have let yourself or your family down Nearly every day   Trouble concentrating on things such as school, work, reading or watching TV Nearly every day   Moving or speaking so slowly that other people could have noticed; or the opposite being so fidgety that others notice More than half the days   Thoughts of being better off dead, or hurting yourself in some way Several days   PHQ 9 Score 24   How difficult have these problems made it for you to do your work, take care of your home and get along with others Very difficult     ADD:   He reports that he was changed to [place] ER 37.5mg on 4/19/2018. The first month went well but the second month he developed significant insomnia. He used to take Adderall XR 20mg but this was not effective. He felt like it would wear off too soon. He was taking Ritalin for this in childhood. He felt that the [place] was continuing to give him insomnia. He was changed to Adderall 30mg XR on 6/7/2018. He reports his sleep has improved. Testicular Cancer:  He was diagnosed in 2010. After surgical intervention they diagnosed him stage 3 with + lymph involvement. He was then started on chemo shortly thereafter with Cisplatin. He had 6 months of chemo. He also had radiation after chemo for approximately 3 months. Dr. Vladimir Deras was following him for this. He has not seen them in 3 years. Review of Systems  A comprehensive review of systems was negative except for that written in the HPI.     Patient Active Problem List   Diagnosis Code    Chronic tension-type headache, intractable G44.221    Migraine without aura and without status migrainosus, not intractable G43.009    Adjustment reaction with anxiety and depression F43.23    Vitamin D deficiency E55.9    Hypercholesterolemia E78.00    Hypertension I10    GERD (gastroesophageal reflux disease) K21.9    ADD (attention deficit disorder) without hyperactivity F98.8    Moderate major depression (HCC) F32.1       Past Medical History:   Diagnosis Date    ADD (attention deficit disorder) without hyperactivity 6/7/2018    Adjustment reaction with anxiety and depression     Attention deficit hyperactivity disorder (ADHD), predominantly inattentive type 6/7/2018    Cancer ([facility] [address]     testicular     Cancer ([facility] 2010    Testicular    Community acquired pneumonia     Depression     GERD (gastroesophageal reflux disease)     Headache     Hypercholesterolemia     Hypertension     Vitamin D deficiency        Past Surgical History:   Procedure Laterality Date    HX ORCHIECTOMY Right 2010    HX UROLOGICAL      removed on e testicle    HX WISDOM TEETH EXTRACTION  1996       Social History   Substance Use Topics    Smoking status: Former Smoker     Packs/day: 1.00     Years: 8.00     Types: Cigarettes     Quit date: 2008    Smokeless tobacco: Never Used    Alcohol use 6.0 oz/week     24 Cans of beer per week       Family History   Problem Relation Age of Onset    Cancer Mother      Ovarian    Hypertension Mother     Cancer Father      Throat    Hypertension Father     Arrhythmia Brother      Afib    No Known Problems Maternal Grandmother     No Known Problems Maternal Grandfather     No Known Problems Paternal Grandmother     No Known Problems Paternal Grandfather        Outpatient Prescriptions Marked as Taking for the 6/12/18 encounter (Office Visit) with Kathy Fleming NP   Medication Sig Dispense Refill    amphetamine-dextroamphetamine XR (ADDERALL XR) 30 mg XR capsule Take 1 Cap (30 mg total) by mouth every morning. Max Daily Amount: 30 mg 30 Cap 0    lisinopril (PRINIVIL, ZESTRIL) 10 mg tablet Take 1 Tab by mouth daily. 90 Tab 3    multivitamin (ONE A DAY) tablet Take 1 Tab by mouth daily.       ibuprofen (MOTRIN) 200 mg tablet Take 400 mg by mouth every eight (8) hours as needed for Pain.  ergocalciferol (ERGOCALCIFEROL) 50,000 unit capsule Take 1 Cap by mouth every seven (7) days. 5 Cap 2    buPROPion SR (WELLBUTRIN SR) 150 mg SR tablet Take 1 Tab by mouth two (2) times a day. 60 Tab 0       No Known Allergies    PE:  Visit Vitals    /88 (BP 1 Location: Left arm, BP Patient Position: Sitting)    Pulse 73    Temp 98.3 °F (36.8 °C) (Temporal)    Resp 18    Ht 6' 4.5\" (1.943 m)    Wt 249 lb 12.8 oz (113.3 kg)    SpO2 98%    BMI 30.01 kg/m2     Gen: alert, oriented, no acute distress  Head: normocephalic, atraumatic  Ears: external auditory canals clear, TMs without erythema or effusion  Eyes: pupils equal round reactive to light, sclera clear, conjunctiva clear  Oral: moist mucus membranes, no oral lesions, no pharyngeal inflammation or exudate  Neck: symmetric normal sized thyroid, no carotid bruits, no jugular vein distention  Resp: no increase work of breathing, lungs clear to ausculation bilaterally, no wheezing, rales or rhonchi  CV: S1, S2 normal.  No murmurs, rubs, or gallops. Abd: soft, not tender, not distended. No hepatosplenomegaly. Normal bowel sounds. No hernias. No abdominal or renal bruits. Neuro: cranial nerves intact, normal strength and movement in all extremities, reflexes and sensation intact and symmetric. Skin: no lesion or rash  Extremities: no cyanosis or edema    Assessment/Plan:    ICD-10-CM ICD-9-CM    1. Moderate major depression (HCC) F32.1 296.22 REFERRAL TO PSYCHIATRY   2. ADD (attention deficit disorder) without hyperactivity F98.8 314.00    3. Vitamin D deficiency E55.9 268.9    4. Adjustment reaction with anxiety and depression F43.23 309.28    5. Essential hypertension I10 401.9      Follow-up Disposition:  Return in about 6 days (around 6/18/2018). reviewed diet, exercise and weight control  reviewed medications and side effects in detail    Health Maintenance reviewed - UTD, pending records.     Recommended healthy diet low in carbohydrates, fats, sodium and cholesterol. Recommended regular cardiovascular exercise 3-6 times per week for 30-60 minutes daily. Chart is reviewed and updated today in the office. Records requested for other providers patient has seen and is currently seeing. Patient was offered a choice/choices in the treatment plan today. Patient expresses understanding of the plan and agrees with recommendations. Verbal and written instructions (see AVS) provided. See patient instructions for more. Patient expresses understanding of diagnosis and treatment plan.

## 2018-06-18 ENCOUNTER — OFFICE VISIT (OUTPATIENT)
Dept: FAMILY MEDICINE CLINIC | Age: 43
End: 2018-06-18

## 2018-06-18 VITALS
RESPIRATION RATE: 17 BRPM | HEART RATE: 75 BPM | HEIGHT: 77 IN | TEMPERATURE: 98.8 F | SYSTOLIC BLOOD PRESSURE: 153 MMHG | OXYGEN SATURATION: 97 % | DIASTOLIC BLOOD PRESSURE: 107 MMHG | BODY MASS INDEX: 29.51 KG/M2 | WEIGHT: 249.9 LBS

## 2018-06-18 DIAGNOSIS — F32.1 MODERATE MAJOR DEPRESSION (HCC): ICD-10-CM

## 2018-06-18 DIAGNOSIS — F98.8 ADD (ATTENTION DEFICIT DISORDER) WITHOUT HYPERACTIVITY: Primary | ICD-10-CM

## 2018-06-18 DIAGNOSIS — F43.23 ADJUSTMENT REACTION WITH ANXIETY AND DEPRESSION: ICD-10-CM

## 2018-06-18 DIAGNOSIS — I10 ESSENTIAL HYPERTENSION: ICD-10-CM

## 2018-06-18 RX ORDER — DEXTROAMPHETAMINE SULFATE, DEXTROAMPHETAMINE SACCHARATE, AMPHETAMINE ASPARTATE MONOHYDRATE, AND AMPHETAMINE SULFATE 9.375; 9.375; 9.375; 9.375 MG/1; MG/1; MG/1; MG/1
CAPSULE, EXTENDED RELEASE ORAL
Refills: 0 | COMMUNITY
Start: 2018-06-13 | End: 2018-06-25 | Stop reason: ALTCHOICE

## 2018-06-18 NOTE — PROGRESS NOTES
Chief Complaint   Patient presents with    Follow-up     / 4: F/u to disposition History, depression    Hypertension     pt states his Bp is HIGH did not take bp med all weekend          HPI:  The patient is a 40 y.o. male who presents today for a follow up appointment. No hospital, ER or specialist visits since last primary care visit except as noted below. HTN:  He was hypertensive in the office last visit and was restarted on his Lisinopril. He is not normotensive in the office today. He did not take his anti-hypertensive     Depression/Anxiety:  He has had many significant life stressors over the past approximately 10 years. He feels that his depression and anxiety started with the diagnosis of cancer. He has had recurrences of this. He took something for anxiety but does not recall the name of it. He currently has not been able to work x 2-3 weeks d/t his depressive symptoms. His current employer is [employer] and he is a . He presents today to discuss FMLA and for treatment options. He also reports he and his ex-wife had a very difficult divorce. She is currently struggling with substance abuse. She recently was in town and stirred up many stressors for he and his two children, 14 yo son, Janeen Balbuena, 17 yo daughter, Mary Carmen Bustamante. He has never been hospitalized for psychiatric disorder. He currently denies SI or HI. His support system includes his girlfriend. He feels that his depression is the most prominent issue at this time. He does have a therapist but he has not seen his therapist 2-3 years ago. He tried to call last week and was referred to another office that has yet to return his call. He presents today after being on Wellbutrin BID x approximately 1 week. He reports his depressive symptoms have improved. He does not feel [place] like he did before\". ADD:   He reports that he was changed to [place] [facility] 37.5mg on 4/19/2018.   The first month went well but the second month he developed significant insomnia. He used to take Adderall XR 20mg but this was not effective. He felt like it would wear off too soon. He was taking Ritalin for this in childhood. He felt that the [facility] was continuing to give him insomnia. He was changed to Adderall 30mg XR on 6/7/2018. He reports his sleep has improved. Testicular Cancer:  He was diagnosed in 2010. After surgical intervention they diagnosed him stage 3 with + lymph involvement. He was then started on chemo shortly thereafter with Cisplatin. He had 6 months of chemo. He also had radiation after chemo for approximately 3 months. Dr. João Her was following him for this. He has not seen them in 3 years. Review of Systems  A comprehensive review of systems was negative except for that written in the HPI.     Patient Active Problem List   Diagnosis Code    Chronic tension-type headache, intractable G44.221    Migraine without aura and without status migrainosus, not intractable G43.009    Adjustment reaction with anxiety and depression F43.23    Vitamin D deficiency E55.9    Hypercholesterolemia E78.00    Hypertension I10    GERD (gastroesophageal reflux disease) K21.9    ADD (attention deficit disorder) without hyperactivity F98.8    Moderate major depression (HCC) F32.1       Past Medical History:   Diagnosis Date    ADD (attention deficit disorder) without hyperactivity 6/7/2018    Adjustment reaction with anxiety and depression     Attention deficit hyperactivity disorder (ADHD), predominantly inattentive type 6/7/2018    Cancer ([address]     testicular     Cancer ([address] 2010    Testicular    Community acquired pneumonia     Depression     GERD (gastroesophageal reflux disease)     Headache     Hypercholesterolemia     Hypertension     Vitamin D deficiency        Past Surgical History:   Procedure Laterality Date    HX ORCHIECTOMY Right 2010    HX UROLOGICAL      removed on e testicle    HX WISDOM TEETH EXTRACTION  1996       Social History   Substance Use Topics    Smoking status: Former Smoker     Packs/day: 1.00     Years: 8.00     Types: Cigarettes     Quit date: 2008    Smokeless tobacco: Never Used    Alcohol use 6.0 oz/week     24 Cans of beer per week       Family History   Problem Relation Age of Onset    Cancer Mother      Ovarian    Hypertension Mother     Cancer Father      Throat    Hypertension Father     Arrhythmia Brother      Afib    No Known Problems Maternal Grandmother     No Known Problems Maternal Grandfather     No Known Problems Paternal Grandmother     No Known Problems Paternal Grandfather        Outpatient Prescriptions Marked as Taking for the 6/18/18 encounter (Office Visit) with Oanh Malloy NP   Medication Sig Dispense Refill    MYDAYIS 37.5 mg CT24 TK ONE C PO  QD  0    amphetamine-dextroamphetamine XR (ADDERALL XR) 30 mg XR capsule Take 1 Cap (30 mg total) by mouth every morning. Max Daily Amount: 30 mg 30 Cap 0    lisinopril (PRINIVIL, ZESTRIL) 10 mg tablet Take 1 Tab by mouth daily. 90 Tab 3    multivitamin (ONE A DAY) tablet Take 1 Tab by mouth daily.  ibuprofen (MOTRIN) 200 mg tablet Take 400 mg by mouth every eight (8) hours as needed for Pain.  ergocalciferol (ERGOCALCIFEROL) 50,000 unit capsule Take 1 Cap by mouth every seven (7) days. 5 Cap 2    buPROPion SR (WELLBUTRIN SR) 150 mg SR tablet Take 1 Tab by mouth two (2) times a day.  60 Tab 0       No Known Allergies    PE:  Visit Vitals    BP (!) 153/107 (BP 1 Location: Right arm, BP Patient Position: Sitting)    Pulse 75    Temp 98.8 °F (37.1 °C) (Temporal)    Resp 17    Ht 6' 4.5\" (1.943 m)    Wt 249 lb 14.4 oz (113.4 kg)    SpO2 97%    BMI 30.02 kg/m2     Gen: alert, oriented, no acute distress  Head: normocephalic, atraumatic  Ears: external auditory canals clear, TMs without erythema or effusion  Eyes: pupils equal round reactive to light, sclera clear, conjunctiva clear  Oral: moist mucus membranes, no oral lesions, no pharyngeal inflammation or exudate  Neck: symmetric normal sized thyroid, no carotid bruits, no jugular vein distention  Resp: no increase work of breathing, lungs clear to ausculation bilaterally, no wheezing, rales or rhonchi  CV: S1, S2 normal.  No murmurs, rubs, or gallops. Abd: soft, not tender, not distended. No hepatosplenomegaly. Normal bowel sounds. No hernias. No abdominal or renal bruits. Neuro: cranial nerves intact, normal strength and movement in all extremities, reflexes and sensation intact and symmetric. Skin: no lesion or rash  Extremities: no cyanosis or edema    Assessment/Plan:    ICD-10-CM ICD-9-CM    1. ADD (attention deficit disorder) without hyperactivity F98.8 314.00    2. Moderate major depression (HCC) F32.1 296.22    3. Adjustment reaction with anxiety and depression F43.23 309.28    4. Essential hypertension I10 401.9      Follow-up Disposition:  Return in about 3 days (around 6/21/2018) for Medication Check, ADHD/ADD, Depression, Anxiety. lab results and schedule of future lab studies reviewed with patient  reviewed diet, exercise and weight control  reviewed medications and side effects in detail    Health Maintenance reviewed - UTD. Recommended healthy diet low in carbohydrates, fats, sodium and cholesterol. Recommended regular cardiovascular exercise 3-6 times per week for 30-60 minutes daily. Chart is reviewed and updated today in the office. Records requested for other providers patient has seen and is currently seeing. Patient was offered a choice/choices in the treatment plan today. Patient expresses understanding of the plan and agrees with recommendations. Verbal and written instructions (see AVS) provided. See patient instructions for more. Patient expresses understanding of diagnosis and treatment plan.

## 2018-06-18 NOTE — PATIENT INSTRUCTIONS
Adjustment Disorder: Care Instructions  Your Care Instructions    Adjustment disorder means that you have emotional or behavioral problems because of stress. But your response to the stress is far more severe than a normal response. It is severe enough to affect your work or social life and may cause depression and physical pains and problems. Events that may cause this response can include a divorce, money problems, or starting school or a new job. It might be anything that causes some stress. This disorder is most often a short-term problem. It happens within 3 months of the stressful event or change. If the response lasts longer than 6 months after the event ends, you may have a more serious disorder. Follow-up care is a key part of your treatment and safety. Be sure to make and go to all appointments, and call your doctor if you are having problems. It's also a good idea to know your test results and keep a list of the medicines you take. How can you care for yourself at home? · Go to all counseling sessions. Do not skip any because you are feeling better. · If your doctor prescribed medicines, take them exactly as prescribed. Call your doctor if you think you are having a problem with your medicine. You will get more details on the specific medicines your doctor prescribes. · Discuss the causes of your stress with a good friend or family member. Or you can join a support group for people with similar problems. Talking to others sometimes relieves stress. · Get at least 30 minutes of exercise on most days of the week. Walking is a good choice. You also may want to do other activities, such as running, swimming, cycling, or playing tennis or team sports. Relaxation techniques  Do relaxation exercises 10 to 20 minutes a day. You can play soothing, relaxing music while you do them, if you wish. · Tell others in your house that you are going to do your relaxation exercises.  Ask them not to disturb you.  · Find a comfortable, quiet place. · Lie down on your back, or sit with your back straight. · Focus on your breathing. Make it slow and steady. · Breathe in through your nose. Breathe out through either your nose or mouth. · Breathe deeply, filling up the area between your navel and your rib cage. Breathe so that your belly goes up and down. · Do not hold your breath. · Breathe like this for 5 to 10 minutes. Notice the feeling of calmness throughout your whole body. As you continue to breathe slowly and deeply, relax by doing these next steps for another 5 to 10 minutes:  · Tighten and relax each muscle group in your body. Start at your toes, and work your way up to your head. · Imagine your muscle groups relaxing and getting heavy. · Empty your mind of all thoughts. · Let yourself relax more and more deeply. · Be aware of the state of calmness that surrounds you. · When your relaxation time is over, you can bring yourself back to alertness by moving your fingers and toes. Then move your hands and feet. And then move your entire body. Sometimes people fall asleep during relaxation. But they most often wake up soon. · Always give yourself time to return to full alertness before you drive a car. Wait to do anything that might cause an accident if you are not fully alert. Never play a relaxation tape while you drive a car. When should you call for help? Call 911 anytime you think you may need emergency care. For example, call if:  ? · You feel you cannot stop from hurting yourself or someone else. Keep the numbers for these national suicide hotlines: 9-779-752-TALK (0-221-946-366.248.6132) and 9-415-FKXFHEU (3-862.137.4919). If you or someone you know talks about suicide or feeling hopeless, get help right away. ? Watch closely for changes in your health, and be sure to contact your doctor if:  ? · You have new anxiety, or your anxiety gets worse.    ? · You have been feeling sad, depressed, or hopeless or have lost interest in things that you usually enjoy. ? · You do not get better as expected. Where can you learn more? Go to http://charissa-maria elena.info/. Enter [id number] in the search box to learn more about \"Adjustment Disorder: Care Instructions. \"  Current as of: July 26, 2016  Content Version: 11.4  © 0719-9514 [employer]. Care instructions adapted under license by [employer] (which disclaims liability or warranty for this information). If you have questions about a medical condition or this instruction, always ask your healthcare professional. [place] [address] any warranty or liability for your use of this information. High Blood Pressure: Care Instructions  Your Care Instructions    If your blood pressure is usually above 140/90, you have high blood pressure, or hypertension. That means the top number is 140 or higher or the bottom number is 90 or higher, or both. Despite what a lot of people think, high blood pressure usually doesn't cause headaches or make you feel dizzy or lightheaded. It usually has no symptoms. But it does increase your risk for heart attack, stroke, and kidney or eye damage. The higher your blood pressure, the more your risk increases. Your doctor will give you a goal for your blood pressure. Your goal will be based on your health and your age. An example of a goal is to keep your blood pressure below 140/90. Lifestyle changes, such as eating healthy and being active, are always important to help lower blood pressure. You might also take medicine to reach your blood pressure goal.  Follow-up care is a key part of your treatment and safety. Be sure to make and go to all appointments, and call your doctor if you are having problems. It's also a good idea to know your test results and keep a list of the medicines you take. How can you care for yourself at home?   Medical treatment  · If you stop taking your medicine, your blood pressure will go back up. You may take one or more types of medicine to lower your blood pressure. Be safe with medicines. Take your medicine exactly as prescribed. Call your doctor if you think you are having a problem with your medicine. · Talk to your doctor before you start taking aspirin every day. Aspirin can help certain people lower their risk of a heart attack or stroke. But taking aspirin isn't right for everyone, because it can cause serious bleeding. · See your doctor regularly. You may need to see the doctor more often at first or until your blood pressure comes down. · If you are taking blood pressure medicine, talk to your doctor before you take decongestants or anti-inflammatory medicine, such as ibuprofen. Some of these medicines can raise blood pressure. · Learn how to check your blood pressure at home. Lifestyle changes  · Stay at a healthy weight. This is especially important if you put on weight around the waist. Losing even 10 pounds can help you lower your blood pressure. · If your doctor recommends it, get more exercise. Walking is a good choice. Bit by bit, increase the amount you walk every day. Try for at least 30 minutes on most days of the week. You also may want to swim, bike, or do other activities. · Avoid or limit alcohol. Talk to your doctor about whether you can drink any alcohol. · Try to limit how much sodium you eat to less than 2,300 milligrams (mg) a day. Your doctor may ask you to try to eat less than 1,500 mg a day. · Eat plenty of fruits (such as bananas and oranges), vegetables, legumes, whole grains, and low-fat dairy products. · Lower the amount of saturated fat in your diet. Saturated fat is found in animal products such as milk, cheese, and meat. Limiting these foods may help you lose weight and also lower your risk for heart disease. · Do not smoke. Smoking increases your risk for heart attack and stroke.  If you need help quitting, talk to your doctor about stop-smoking programs and medicines. These can increase your chances of quitting for good. When should you call for help? Call 911 anytime you think you may need emergency care. This may mean having symptoms that suggest that your blood pressure is causing a serious heart or blood vessel problem. Your blood pressure may be over 180/110. ? For example, call 911 if:  ? · You have symptoms of a heart attack. These may include:  ¨ Chest pain or pressure, or a strange feeling in the chest.  ¨ Sweating. ¨ Shortness of breath. ¨ Nausea or vomiting. ¨ Pain, pressure, or a strange feeling in the back, neck, jaw, or upper belly or in one or both shoulders or arms. ¨ Lightheadedness or sudden weakness. ¨ A fast or irregular heartbeat. ? · You have symptoms of a stroke. These may include:  ¨ Sudden numbness, tingling, weakness, or loss of movement in your face, arm, or leg, especially on only one side of your body. ¨ Sudden vision changes. ¨ Sudden trouble speaking. ¨ Sudden confusion or trouble understanding simple statements. ¨ Sudden problems with walking or balance. ¨ A sudden, severe headache that is different from past headaches. ? · You have severe back or belly pain. ?Do not wait until your blood pressure comes down on its own. Get help right away. ?Call your doctor now or seek immediate care if:  ? · Your blood pressure is much higher than normal (such as 180/110 or higher), but you don't have symptoms. ? · You think high blood pressure is causing symptoms, such as:  ¨ Severe headache. ¨ Blurry vision. ? Watch closely for changes in your health, and be sure to contact your doctor if:  ? · Your blood pressure measures 140/90 or higher at least 2 times. That means the top number is 140 or higher or the bottom number is 90 or higher, or both. ? · You think you may be having side effects from your blood pressure medicine.    ? · Your blood pressure is usually normal, but it goes above normal at least 2 times. Where can you learn more? Go to http://charissa-maria elena.info/. Enter R010 in the search box to learn more about \"High Blood Pressure: Care Instructions. \"  Current as of: September 21, 2016  Content Version: 11.4  © 8950-4500 [employer]. Care instructions adapted under license by [employer] (which disclaims liability or warranty for this information). If you have questions about a medical condition or this instruction, always ask your healthcare professional. [address] any warranty or liability for your use of this information.

## 2018-06-18 NOTE — MR AVS SNAPSHOT
[facility] 
 
 
 [address] [facility] 
[address] [place] [address] 
[phone] Patient: Grisel Chaparro MRN: [id number] :1975 Visit Information Date & Time Provider Department Dept. Phone Encounter #  
 2018  4:00 PM Kimani Bravo NP [facility] [phone] [id number] Follow-up Instructions Return in about 3 days (around 2018) for Medication Check, ADHD/ADD, Depression, Anxiety. Upcoming Health Maintenance Date Due Pneumococcal 19-64 Highest Risk (1 of 3 - PCV13) 2018* DTaP/Tdap/Td series (1 - Tdap) 2019* Influenza Age 5 to Adult 2018 *Topic was postponed. The date shown is not the original due date. Allergies as of 2018  Review Complete On: 2018 By: Kimani Bravo NP No Known Allergies Current Immunizations  Reviewed on 2018 No immunizations on file. Not reviewed this visit You Were Diagnosed With   
  
 Codes Comments ADD (attention deficit disorder) without hyperactivity    -  Primary ICD-10-CM: F98.8 ICD-9-CM: 314.00 Moderate major depression (HCC)     ICD-10-CM: F32.1 ICD-9-CM: 296.22 Adjustment reaction with anxiety and depression     ICD-10-CM: F43.23 
ICD-9-CM: 309.28 Essential hypertension     ICD-10-CM: I10 
ICD-9-CM: 401.9 Vitals BP Pulse Temp Resp Height(growth percentile) (!) 153/107 (BP 1 Location: Right arm, BP Patient Position: Sitting) 75 98.8 °F (37.1 °C) (Temporal) 17 6' 4.5\" (1.943 m) Weight(growth percentile) SpO2 BMI Smoking Status 249 lb 14.4 oz (113.4 kg) 97% 30.02 kg/m2 Former Smoker BMI and BSA Data Body Mass Index Body Surface Area 30.02 kg/m 2 2.47 m 2 Preferred Pharmacy Pharmacy Name Phone [facility] [address] AT [address]  [phone] Your Updated Medication List  
  
   
 This list is accurate as of 6/18/18  4:52 PM.  Always use your most recent med list.  
  
  
  
  
 * amphetamine-dextroamphetamine XR 30 mg XR capsule Commonly known as:  ADDERALL XR Take 1 Cap (30 mg total) by mouth every morning. Max Daily Amount: 30 mg  
  
 * MYDAYIS 37.5 mg Ct24 Generic drug:  dextroamphetamine-amphetamine TK ONE C PO  QD  
  
 buPROPion  mg SR tablet Commonly known as:  Zita Aw Take 1 Tab by mouth two (2) times a day. ergocalciferol 50,000 unit capsule Commonly known as:  ERGOCALCIFEROL Take 1 Cap by mouth every seven (7) days. ibuprofen 200 mg tablet Commonly known as:  MOTRIN Take 400 mg by mouth every eight (8) hours as needed for Pain. lisinopril 10 mg tablet Commonly known as:  Tami Flight Take 1 Tab by mouth daily. multivitamin tablet Commonly known as:  ONE A DAY Take 1 Tab by mouth daily. * Notice: This list has 2 medication(s) that are the same as other medications prescribed for you. Read the directions carefully, and ask your doctor or other care provider to review them with you. Follow-up Instructions Return in about 3 days (around 6/21/2018) for Medication Check, ADHD/ADD, Depression, Anxiety. Patient Instructions Adjustment Disorder: Care Instructions Your Care Instructions Adjustment disorder means that you have emotional or behavioral problems because of stress. But your response to the stress is far more severe than a normal response. It is severe enough to affect your work or social life and may cause depression and physical pains and problems. Events that may cause this response can include a divorce, money problems, or starting school or a new job. It might be anything that causes some stress. This disorder is most often a short-term problem. It happens within 3 months of the stressful event or change.  If the response lasts longer than 6 months after the event ends, you may have a more serious disorder. Follow-up care is a key part of your treatment and safety. Be sure to make and go to all appointments, and call your doctor if you are having problems. It's also a good idea to know your test results and keep a list of the medicines you take. How can you care for yourself at home? · Go to all counseling sessions. Do not skip any because you are feeling better. · If your doctor prescribed medicines, take them exactly as prescribed. Call your doctor if you think you are having a problem with your medicine. You will get more details on the specific medicines your doctor prescribes. · Discuss the causes of your stress with a good friend or family member. Or you can join a support group for people with similar problems. Talking to others sometimes relieves stress. · Get at least 30 minutes of exercise on most days of the week. Walking is a good choice. You also may want to do other activities, such as running, swimming, cycling, or playing tennis or team sports. Relaxation techniques Do relaxation exercises 10 to 20 minutes a day. You can play soothing, relaxing music while you do them, if you wish. · Tell others in your house that you are going to do your relaxation exercises. Ask them not to disturb you. · Find a comfortable, quiet place. · Lie down on your back, or sit with your back straight. · Focus on your breathing. Make it slow and steady. · Breathe in through your nose. Breathe out through either your nose or mouth. · Breathe deeply, filling up the area between your navel and your rib cage. Breathe so that your belly goes up and down. · Do not hold your breath. · Breathe like this for 5 to 10 minutes. Notice the feeling of calmness throughout your whole body. As you continue to breathe slowly and deeply, relax by doing these next steps for another 5 to 10 minutes: · Tighten and relax each muscle group in your body. Start at your toes, and work your way up to your head. · Imagine your muscle groups relaxing and getting heavy. · Empty your mind of all thoughts. · Let yourself relax more and more deeply. · Be aware of the state of calmness that surrounds you. · When your relaxation time is over, you can bring yourself back to alertness by moving your fingers and toes. Then move your hands and feet. And then move your entire body. Sometimes people fall asleep during relaxation. But they most often wake up soon. · Always give yourself time to return to full alertness before you drive a car. Wait to do anything that might cause an accident if you are not fully alert. Never play a relaxation tape while you drive a car. When should you call for help? Call 911 anytime you think you may need emergency care. For example, call if: 
? · You feel you cannot stop from hurting yourself or someone else. Keep the numbers for these national suicide hotlines: 5-750-456-TALK (6-515.165.1958) and 9-534-PWMDBHL (5-812.622.3394). If you or someone you know talks about suicide or feeling hopeless, get help right away. ? Watch closely for changes in your health, and be sure to contact your doctor if: 
? · You have new anxiety, or your anxiety gets worse. ? · You have been feeling sad, depressed, or hopeless or have lost interest in things that you usually enjoy. ? · You do not get better as expected. Where can you learn more? Go to http://charissa-maria elena.info/. Enter [id number] in the search box to learn more about \"Adjustment Disorder: Care Instructions. \" Current as of: July 26, 2016 Content Version: 11.4 © 9079-7350 [employer]wise, Incorporated. Care instructions adapted under license by [employer] (which disclaims liability or warranty for this information).  If you have questions about a medical condition or this instruction, always ask your healthcare professional. [address] any warranty or liability for your use of this information. High Blood Pressure: Care Instructions Your Care Instructions If your blood pressure is usually above 140/90, you have high blood pressure, or hypertension. That means the top number is 140 or higher or the bottom number is 90 or higher, or both. Despite what a lot of people think, high blood pressure usually doesn't cause headaches or make you feel dizzy or lightheaded. It usually has no symptoms. But it does increase your risk for heart attack, stroke, and kidney or eye damage. The higher your blood pressure, the more your risk increases. Your doctor will give you a goal for your blood pressure. Your goal will be based on your health and your age. An example of a goal is to keep your blood pressure below 140/90. Lifestyle changes, such as eating healthy and being active, are always important to help lower blood pressure. You might also take medicine to reach your blood pressure goal. 
Follow-up care is a key part of your treatment and safety. Be sure to make and go to all appointments, and call your doctor if you are having problems. It's also a good idea to know your test results and keep a list of the medicines you take. How can you care for yourself at home? Medical treatment · If you stop taking your medicine, your blood pressure will go back up. You may take one or more types of medicine to lower your blood pressure. Be safe with medicines. Take your medicine exactly as prescribed. Call your doctor if you think you are having a problem with your medicine. · Talk to your doctor before you start taking aspirin every day. Aspirin can help certain people lower their risk of a heart attack or stroke. But taking aspirin isn't right for everyone, because it can cause serious bleeding. · See your doctor regularly. You may need to see the doctor more often at first or until your blood pressure comes down. · If you are taking blood pressure medicine, talk to your doctor before you take decongestants or anti-inflammatory medicine, such as ibuprofen. Some of these medicines can raise blood pressure. · Learn how to check your blood pressure at home. Lifestyle changes · Stay at a healthy weight. This is especially important if you put on weight around the waist. Losing even 10 pounds can help you lower your blood pressure. · If your doctor recommends it, get more exercise. Walking is a good choice. Bit by bit, increase the amount you walk every day. Try for at least 30 minutes on most days of the week. You also may want to swim, bike, or do other activities. · Avoid or limit alcohol. Talk to your doctor about whether you can drink any alcohol. · Try to limit how much sodium you eat to less than 2,300 milligrams (mg) a day. Your doctor may ask you to try to eat less than 1,500 mg a day. · Eat plenty of fruits (such as bananas and oranges), vegetables, legumes, whole grains, and low-fat dairy products. · Lower the amount of saturated fat in your diet. Saturated fat is found in animal products such as milk, cheese, and meat. Limiting these foods may help you lose weight and also lower your risk for heart disease. · Do not smoke. Smoking increases your risk for heart attack and stroke. If you need help quitting, talk to your doctor about stop-smoking programs and medicines. These can increase your chances of quitting for good. When should you call for help? Call 911 anytime you think you may need emergency care. This may mean having symptoms that suggest that your blood pressure is causing a serious heart or blood vessel problem. Your blood pressure may be over 180/110. ? For example, call 911 if: 
? · You have symptoms of a heart attack. These may include: ¨ Chest pain or pressure, or a strange feeling in the chest. 
¨ Sweating. ¨ Shortness of breath. ¨ Nausea or vomiting. ¨ Pain, pressure, or a strange feeling in the back, neck, jaw, or upper belly or in one or both shoulders or arms. ¨ Lightheadedness or sudden weakness. ¨ A fast or irregular heartbeat. ? · You have symptoms of a stroke. These may include: 
¨ Sudden numbness, tingling, weakness, or loss of movement in your face, arm, or leg, especially on only one side of your body. ¨ Sudden vision changes. ¨ Sudden trouble speaking. ¨ Sudden confusion or trouble understanding simple statements. ¨ Sudden problems with walking or balance. ¨ A sudden, severe headache that is different from past headaches. ? · You have severe back or belly pain. ?Do not wait until your blood pressure comes down on its own. Get help right away. ?Call your doctor now or seek immediate care if: 
? · Your blood pressure is much higher than normal (such as 180/110 or higher), but you don't have symptoms. ? · You think high blood pressure is causing symptoms, such as: ¨ Severe headache. ¨ Blurry vision. ? Watch closely for changes in your health, and be sure to contact your doctor if: 
? · Your blood pressure measures 140/90 or higher at least 2 times. That means the top number is 140 or higher or the bottom number is 90 or higher, or both. ? · You think you may be having side effects from your blood pressure medicine. ? · Your blood pressure is usually normal, but it goes above normal at least 2 times. Where can you learn more? Go to http://charissa-maria elena.info/. Enter B131 in the search box to learn more about \"High Blood Pressure: Care Instructions. \" Current as of: September 21, 2016 Content Version: 11.4 © 8360-4358 [employer].  Care instructions adapted under license by "[employer] (which disclaims liability or warranty for this information). If you have questions about a medical condition or this instruction, always ask your healthcare professional. [address] any warranty or liability for your use of this information. Introducing [facility]! Bernice Fuller introduces [email or web address] patient portal. Now you can access parts of your medical record, email your doctor's office, and request medication refills online. 1. In your internet browser, go to [email or web address] 2. Click on the First Time User? Click Here link in the Sign In box. You will see the New Member Sign Up page. 3. Enter your [email or web address] Access Code exactly as it appears below. You will not need to use this code after youve completed the sign-up process. If you do not sign up before the expiration date, you must request a new code. · [email or web address] Access Code: [email or web address] Expires: 9/10/2018  4:04 PM 
 
4. Enter the last four digits of your Social Security Number (xxxx) and Date of Birth (mm/dd/yyyy) as indicated and click Submit. You will be taken to the next sign-up page. 5. Create a [email or web address] ID. This will be your [email or web address] login ID and cannot be changed, so think of one that is secure and easy to remember. 6. Create a [email or web address] password. You can change your password at any time. 7. Enter your Password Reset Question and Answer. This can be used at a later time if you forget your password. 8. Enter your e-mail address. You will receive e-mail notification when new information is available in [address]. 9. Click Sign Up. You can now view and download portions of your medical record. 10. Click the Download Summary menu link to download a portable copy of your medical information. If you have questions, please visit the Frequently Asked Questions section of the [email or web address] website. Remember, [email or web address] is NOT to be used for urgent needs. For medical emergencies, dial 911. Now available from your iPhone and Android! Please provide this summary of care documentation to your next provider. Your primary care clinician is listed as [place] Aguilar. Ana Deleon. If you have any questions after today's visit, please call [phone].

## 2018-06-25 ENCOUNTER — OFFICE VISIT (OUTPATIENT)
Dept: FAMILY MEDICINE CLINIC | Age: 43
End: 2018-06-25

## 2018-06-25 VITALS
BODY MASS INDEX: 29.3 KG/M2 | DIASTOLIC BLOOD PRESSURE: 83 MMHG | TEMPERATURE: 97 F | OXYGEN SATURATION: 97 % | RESPIRATION RATE: 18 BRPM | SYSTOLIC BLOOD PRESSURE: 133 MMHG | HEIGHT: 77 IN | HEART RATE: 80 BPM | WEIGHT: 248.1 LBS

## 2018-06-25 DIAGNOSIS — F98.8 ADD (ATTENTION DEFICIT DISORDER) WITHOUT HYPERACTIVITY: ICD-10-CM

## 2018-06-25 DIAGNOSIS — F15.10 AMPHETAMINE ABUSE, EPISODIC (HCC): ICD-10-CM

## 2018-06-25 DIAGNOSIS — F43.23 ADJUSTMENT REACTION WITH ANXIETY AND DEPRESSION: Primary | ICD-10-CM

## 2018-06-25 RX ORDER — ALPRAZOLAM 1 MG/1
0.5 TABLET ORAL
Qty: 10 TAB | Refills: 0 | Status: SHIPPED | OUTPATIENT
Start: 2018-06-25 | End: 2018-07-02 | Stop reason: ALTCHOICE

## 2018-06-25 NOTE — PROGRESS NOTES
Chief Complaint   Patient presents with    Form Completion     FMLA Paperwork    Attention Deficit Disorder    Depression         HPI:  The patient is a 40 y.o. male who presents today for a follow up appointment. No hospital, ER or specialist visits since last primary care visit except as noted below. HTN:  He was hypertensive in the office last visit and was restarted on his Lisinopril. He is normotensive in the office today. Depression/Anxiety:  He has had many significant life stressors over the past approximately 10 years. He feels that his depression and anxiety started with the diagnosis of cancer. He has had recurrences of this. He took something for anxiety but does not recall the name of it. He currently has not been able to work x 2-3 weeks d/t his depressive symptoms. His current employer is [employer] and he is a . He presents today to discuss FMLA and for treatment options. He also reports he and his ex-wife had a very difficult divorce. She is currently struggling with substance abuse. She recently was in town and stirred up many stressors for he and his two children, 12 yo son, Jean Spatz, 15 yo daughter, Keagan Tapia. He has never been hospitalized for psychiatric disorder. He currently denies SI or HI. His support system includes his girlfriend. He feels that his depression is the most prominent issue at this time. He does have a therapist but he has not seen his therapist 2-3 years ago. He tried to call last week and was referred to another office that has yet to return his call. He presents today after being on Wellbutrin BID x approximately 1 week. He reports his depressive symptoms have improved. He does not feel Luis Daniel Langston like he did before\". ADD:   He reports that he was changed to [facility] Gianfranco ER 37.5mg on 4/19/2018. The first month went well but the second month he developed significant insomnia.   He used to take Adderall XR 20mg but this was not effective. He felt like it would wear off too soon. He was taking Ritalin for this in childhood. He felt that the [facility] Gianfranco was continuing to give him insomnia. He was changed to Adderall 30mg XR on 6/7/2018. He reports his sleep has improved. He reports taking 4-7 pills daily. Review of Systems  A comprehensive review of systems was negative except for that written in the HPI.     Patient Active Problem List   Diagnosis Code    Chronic tension-type headache, intractable G44.221    Migraine without aura and without status migrainosus, not intractable G43.009    Adjustment reaction with anxiety and depression F43.23    Vitamin D deficiency E55.9    Hypercholesterolemia E78.00    Hypertension I10    GERD (gastroesophageal reflux disease) K21.9    ADD (attention deficit disorder) without hyperactivity F98.8    Moderate major depression (HCC) F32.1    Amphetamine abuse, episodic F15.10       Past Medical History:   Diagnosis Date    ADD (attention deficit disorder) without hyperactivity 6/7/2018    Adjustment reaction with anxiety and depression     Attention deficit hyperactivity disorder (ADHD), predominantly inattentive type 6/7/2018    Cancer ([facility] [address]     testicular     Cancer ([facility] [address] 2010    Testicular    Community acquired pneumonia     Depression     GERD (gastroesophageal reflux disease)     Headache     Hypercholesterolemia     Hypertension     Vitamin D deficiency        Past Surgical History:   Procedure Laterality Date    HX ORCHIECTOMY Right 2010    HX UROLOGICAL      removed on e testicle    HX WISDOM TEETH EXTRACTION  1996       Social History   Substance Use Topics    Smoking status: Former Smoker     Packs/day: 1.00     Years: 8.00     Types: Cigarettes     Quit date: 2008    Smokeless tobacco: Never Used    Alcohol use 6.0 oz/week     24 Cans of beer per week       Family History   Problem Relation Age of Onset    Cancer Mother      Ovarian    Hypertension Mother    Yamilet Minoo Cancer Father      Throat    Hypertension Father     Arrhythmia Brother      Afib    No Known Problems Maternal Grandmother     No Known Problems Maternal Grandfather     No Known Problems Paternal Grandmother     No Known Problems Paternal Grandfather        Outpatient Prescriptions Marked as Taking for the 6/25/18 encounter (Office Visit) with Gagan Gutierrez NP   Medication Sig Dispense Refill    ALPRAZolam (XANAX) 1 mg tablet Take 0.5 Tabs by mouth three (3) times daily as needed for Anxiety. Max Daily Amount: 1.5 mg. 10 Tab 0    lisinopril (PRINIVIL, ZESTRIL) 10 mg tablet Take 1 Tab by mouth daily. 90 Tab 3    multivitamin (ONE A DAY) tablet Take 1 Tab by mouth daily.  ibuprofen (MOTRIN) 200 mg tablet Take 400 mg by mouth every eight (8) hours as needed for Pain.  ergocalciferol (ERGOCALCIFEROL) 50,000 unit capsule Take 1 Cap by mouth every seven (7) days. 5 Cap 2    buPROPion SR (WELLBUTRIN SR) 150 mg SR tablet Take 1 Tab by mouth two (2) times a day. 60 Tab 0       No Known Allergies    PE:  Visit Vitals    /83 (BP 1 Location: Left arm, BP Patient Position: Sitting)    Pulse 80    Temp 97 °F (36.1 °C) (Oral)    Resp 18    Ht 6' 4.5\" (1.943 m)    Wt 248 lb 1.6 oz (112.5 kg)    SpO2 97%    BMI 29.81 kg/m2     Gen: alert, oriented, no acute distress  Head: normocephalic, atraumatic  Ears: external auditory canals clear, TMs without erythema or effusion  Eyes: pupils equal round reactive to light, sclera clear, conjunctiva clear  Oral: moist mucus membranes, no oral lesions, no pharyngeal inflammation or exudate  Neck: symmetric normal sized thyroid, no carotid bruits, no jugular vein distention  Resp: no increase work of breathing, lungs clear to ausculation bilaterally, no wheezing, rales or rhonchi  CV: S1, S2 normal.  No murmurs, rubs, or gallops. Abd: soft, not tender, not distended. No hepatosplenomegaly. Normal bowel sounds. No hernias.  No abdominal or renal bruits. Neuro: cranial nerves intact, normal strength and movement in all extremities, reflexes and sensation intact and symmetric. Skin: no lesion or rash  Extremities: no cyanosis or edema    Assessment/Plan:    ICD-10-CM ICD-9-CM    1. Adjustment reaction with anxiety and depression F43.23 309.28 ALPRAZolam (XANAX) 1 mg tablet   2. ADD (attention deficit disorder) without hyperactivity F98.8 314.00 ALPRAZolam (XANAX) 1 mg tablet   3. Amphetamine abuse, episodic F15.10 305.72 ALPRAZolam (XANAX) 1 mg tablet     Follow-up Disposition:  Return in about 2 days (around 6/27/2018) for Medication Check, ADHD/ADD, Depression. lab results and schedule of future lab studies reviewed with patient - pt to come fasting on Wednesday, will also obtain an EKG at that visit as well.  reviewed diet, exercise and weight control  reviewed medications and side effects in detail - advised patient to only take xanax as prescribed if he should become agitated in the next three days. Controlled substance plan and safety assessment:  Medication: Xanax  MME/day: N/A   >= 50? N/A   >= 120? Naloxone Rx? rx given today   appropriate and last checked on: 6/25/2018  Last Urine Toxicology: next visit  Treatment agreement was signed by pt and myself on: 6/25/2018  24 hour opioid dose >120mg morphine equivalent/day:  [] Yes   [x] No  Benzodiazepines:  [x] Yes   [] No  Sleep apnea:  [] Yes   [x] No  Urine Toxicology Testing within last 6 months:  [] Yes   [x] No  History of or new aberrant medication taking behaviors:  [x] Yes   [] No        Health Maintenance reviewed - reviewed, UTD. Recommended healthy diet low in carbohydrates, fats, sodium and cholesterol. Recommended regular cardiovascular exercise 3-6 times per week for 30-60 minutes daily. Chart is reviewed and updated today in the office. Records requested for other providers patient has seen and is currently seeing.     Patient was offered a choice/choices in the treatment plan today. Patient expresses understanding of the plan and agrees with recommendations. Verbal and written instructions (see AVS) provided. See patient instructions for more. Patient expresses understanding of diagnosis and treatment plan.

## 2018-06-25 NOTE — PATIENT INSTRUCTIONS
Adjustment Disorder: Care Instructions  Your Care Instructions    Adjustment disorder means that you have emotional or behavioral problems because of stress. But your response to the stress is far more severe than a normal response. It is severe enough to affect your work or social life and may cause depression and physical pains and problems. Events that may cause this response can include a divorce, money problems, or starting school or a new job. It might be anything that causes some stress. This disorder is most often a short-term problem. It happens within 3 months of the stressful event or change. If the response lasts longer than 6 months after the event ends, you may have a more serious disorder. Follow-up care is a key part of your treatment and safety. Be sure to make and go to all appointments, and call your doctor if you are having problems. It's also a good idea to know your test results and keep a list of the medicines you take. How can you care for yourself at home? · Go to all counseling sessions. Do not skip any because you are feeling better. · If your doctor prescribed medicines, take them exactly as prescribed. Call your doctor if you think you are having a problem with your medicine. You will get more details on the specific medicines your doctor prescribes. · Discuss the causes of your stress with a good friend or family member. Or you can join a support group for people with similar problems. Talking to others sometimes relieves stress. · Get at least 30 minutes of exercise on most days of the week. Walking is a good choice. You also may want to do other activities, such as running, swimming, cycling, or playing tennis or team sports. Relaxation techniques  Do relaxation exercises 10 to 20 minutes a day. You can play soothing, relaxing music while you do them, if you wish. · Tell others in your house that you are going to do your relaxation exercises.  Ask them not to disturb you.  · Find a comfortable, quiet place. · Lie down on your back, or sit with your back straight. · Focus on your breathing. Make it slow and steady. · Breathe in through your nose. Breathe out through either your nose or mouth. · Breathe deeply, filling up the area between your navel and your rib cage. Breathe so that your belly goes up and down. · Do not hold your breath. · Breathe like this for 5 to 10 minutes. Notice the feeling of calmness throughout your whole body. As you continue to breathe slowly and deeply, relax by doing these next steps for another 5 to 10 minutes:  · Tighten and relax each muscle group in your body. Start at your toes, and work your way up to your head. · Imagine your muscle groups relaxing and getting heavy. · Empty your mind of all thoughts. · Let yourself relax more and more deeply. · Be aware of the state of calmness that surrounds you. · When your relaxation time is over, you can bring yourself back to alertness by moving your fingers and toes. Then move your hands and feet. And then move your entire body. Sometimes people fall asleep during relaxation. But they most often wake up soon. · Always give yourself time to return to full alertness before you drive a car. Wait to do anything that might cause an accident if you are not fully alert. Never play a relaxation tape while you drive a car. When should you call for help? Call 911 anytime you think you may need emergency care. For example, call if:  ? · You feel you cannot stop from hurting yourself or someone else. Keep the numbers for these national suicide hotlines: 7-345-270-TALK (3-507.254.6849) and 1-625-BFBHMCK (6-522.139.9956). If you or someone you know talks about suicide or feeling hopeless, get help right away. ? Watch closely for changes in your health, and be sure to contact your doctor if:  ? · You have new anxiety, or your anxiety gets worse.    ? · You have been feeling sad, depressed, or hopeless or have lost interest in things that you usually enjoy. ? · You do not get better as expected. Where can you learn more? Go to http://charissa-maria elena.info/. Enter [id number] [id number] in the search box to learn more about \"Adjustment Disorder: Care Instructions. \"  Current as of: July 26, 2016  Content Version: 11.4  © 7889-5315 [employer]. Care instructions adapted under license by [employer] (which disclaims liability or warranty for this information). If you have questions about a medical condition or this instruction, always ask your healthcare professional. [place] [address] any warranty or liability for your use of this information.

## 2018-06-25 NOTE — LETTER
Name:.Kenn Jay :1975 MR #:[id number] Provider Name:Luisa Baca NP  
*LVQN-337* BSMG-491 () Page 1 of 5 Initial [employer] CONTROLLED SUBSTANCE AGREEMENT I may be prescribed medications that are controlled substances as part  of my treatment plan for management of my medical condition(s). The goal of my treatment plan is to maintain and/or improve my health and wellbeing. Because controlled substances have an increased risk of abuse or harm, continual re-evaluation is needed determine if the goals of my treatment plan are being met for my safety and the safety of others. Rito Roche  am entering into this Controlled Substance Agreement with my provider, Tirso Munoz NP at [facility] . I understand that successful treatment requires mutual trust and honesty between me and my provider. I understand that there are state and federal laws and regulations which apply to the medications that my provider may prescribe that must be followed. I understand there are risks and benefits ts of taking the medicines that my provider may prescribe. I understand and agree that following this Agreement is necessary in continuing my provider-patient relationship and success of my treatment plan. As a part of my treatment plan, I agree to the following: COMMUNICATION: 
 
1. I will communicate fully with my provider about my medical condition(s), including the effect on my daily life and how well my medications are helping. I will tell my provider all of the medications that I take for any reason, including medications I receive from another health care provider, and will notify my provider about all issues, problems or concerns, including any side effects, which may be related to my medications. I understand that this information allows my provider to adjust my treatment plan to help manage my medical condition.  I understand that this information will become part of my permanent medical record. 2. I will notify my provider if I have a history of alcohol/drug misuse/addiction or if I have had treatment for alcohol/drug addiction in the past, or if I have a new problem with or concern about alcohol/drug use/addiction, because this increases the likelihood of high risk behaviors and may lead to serious medical conditions. 3. Females Only: I will notify my provider if I am or become pregnant, or if I intend to become pregnant, or if I intend to breastfeed. I understand that communication of these issues with my provider is important, due to possible effects my medication could have on an unborn fetus or breastfeeding child. Name:Toni Chappell :1975 MR #:[id number] Provider Name:Luisa Mcgee NP  
*MGQQ-711* BSMG-491 () Page 2 of 5 Initial [employer] MISUSE OF MEDICATIONS / DRUGS: 
 
1. I agree to take all controlled substances as prescribed, and will not misuse or abuse any controlled substances prescribed by my provider. For my safety, I will not increase the amount of medicine I take without first talking with and getting permission from my provider. 2. If I have a medical emergency, another health care provider may prescribe me medication. If I seek emergency treatment, I will notify my provider within seventy-two (72) hours. 3. I understand that my provider may discuss my use and/or possible misuse/abuse of controlled substances and alcohol, as appropriate, with any health care provider involved in my care, pharmacist or legal authority. ILLEGAL DRUGS: 
 
1. I will not use illegal drugs of any kind, including but not limited to marijuana, heroin, cocaine, or any prescription drug which is not prescribed to me. DRUG DIVERSION / PRESCRIPTION FRAUD: 
 
1. I will not share, sell, trade, give away, or otherwise misuse my prescriptions or medications. 2. I will not alter any prescriptions provided to me by my provider. SINGLE PROVIDER: 
 
1. I agree that all controlled substances that I take will be prescribed only by my provider (or his/her covering provider) under this Agreement. This agreement does not prevent me from seeking emergency medical treatment or receiving pain management related to a surgery. PROTECTING MEDICATIONS: 
 
1. I am responsible for keeping my prescriptions and medications in a safe and secure place including safeguarding them from loss or theft. I understand that lost, stolen or damaged/destroyed prescriptions or medications will not be replaced. Name:Toni Castaneda :1975 MR #:[id number] Provider Name:Luisa Marie, NP  
*ADTU-599* BSMG-491 () Page 3 of 5 Initial [employer] PRESCRIPTION RENEWALS/REFILLS: 
 
1. I will follow my controlled substance medication schedule as prescribed by my provider. 2. I understand and agree that I will make any requests for renewals or refills of my prescriptions only at the time of an office visit or during my providers regular office hours subject to the prescription refill requirements of the individual practice. 3. I understand that my provider may not call in prescriptions for controlled substances to my pharmacy. 4. I understand that my provider may adjust or discontinue these medications as deemed appropriate for my medical treatment plan. This Agreement does not guarantee the prescription of controlled medications. 5. I agree that if my medications are adjusted or discontinued, I will properly dispose of any remaining medications. I understand that I will be required to dispose of any remaining controlled medications prior to being provided with any prescriptions for other controlled medications.  
 
 
1. I authorize my provider and my pharmacy to cooperate fully with any local, state, or federal law enforcement agency in the investigation of any possible misuse, sale, or other diversion of my controlled substance prescriptions or medications. RISKS: 
 
 
1. I understand that if I do not adhere to this Agreement in any way, my provider may change my prescriptions, stop prescribing controlled substances or end our provider-patient relationship. 2. If my provider decides to stop prescribing medication, or decides to end our provider-patient relationship,my provider may require that I taper my medications slowly. If necessary, my provider may also provide a prescription for other medications to treat my withdrawal symptoms. UNDERSTANDING THIS AGREEMENT: 
 
I understand that my provider may adjust or stop my prescriptions for controlled substances based on my medical condition and my treatment plan. I understand that this Agreement does not guarantee that I will be prescribed medications or controlled substances. I understand that controlled substances may be just one part 
of my treatment plan. My initial on each page and my signature below shows that I have read each page of this Agreement, I have had an opportunity to ask questions, and all of my questions have been answered to my satisfaction by my provider. By signing below, I agree to comply with this Agreement, and I understand that if I do not follow the Agreements listed above, my provider may stop 
 
 
 
_________________________________________  Date/Time 6/25/2018 4:40 PM   
             (Patient Signature)

## 2018-06-25 NOTE — MR AVS SNAPSHOT
[address] [place] 
[address] [facility] [address] 
[phone] Patient: Jocelyn Ramesh MRN: [id number] :1975 Visit Information Date & Time Provider Department Dept. Phone Encounter #  
 2018  4:00 PM Gloria Subramanian NP [facility] [phone] [id number] Follow-up Instructions Return in about 2 days (around 2018) for Medication Check, ADHD/ADD, Depression. Upcoming Health Maintenance Date Due Pneumococcal 19-64 Highest Risk (1 of 3 - PCV13) 2018* DTaP/Tdap/Td series (1 - Tdap) 2019* Influenza Age 5 to Adult 2018 *Topic was postponed. The date shown is not the original due date. Allergies as of 2018  Review Complete On: 2018 By: Devin Valdez LPN No Known Allergies Current Immunizations  Reviewed on 2018 No immunizations on file. Not reviewed this visit You Were Diagnosed With   
  
 Codes Comments Adjustment reaction with anxiety and depression    -  Primary ICD-10-CM: F43.23 
ICD-9-CM: 309.28   
 ADD (attention deficit disorder) without hyperactivity     ICD-10-CM: F98.8 ICD-9-CM: 314.00 Amphetamine abuse, episodic     ICD-10-CM: F15.10 ICD-9-CM: 305.72 Vitals BP Pulse Temp Resp Height(growth percentile) Weight(growth percentile) 133/83 (BP 1 Location: Left arm, BP Patient Position: Sitting) 80 97 °F (36.1 °C) (Oral) 18 6' 4.5\" (1.943 m) 248 lb 1.6 oz (112.5 kg) SpO2 BMI Smoking Status 97% 29.81 kg/m2 Former Smoker Vitals History BMI and BSA Data Body Mass Index Body Surface Area  
 29.81 kg/m 2 2.46 m 2 Preferred Pharmacy Pharmacy Name Phone [facility] [address]  [place] [address] [phone] Your Updated Medication List  
  
   
This list is accurate as of 18  4:38 PM.  Always use your most recent med list.  
  
  
  
  
 ALPRAZolam 1 mg tablet Commonly known as:  Kelly Angle Take 0.5 Tabs by mouth three (3) times daily as needed for Anxiety. Max Daily Amount: 1.5 mg.  
  
 buPROPion  mg SR tablet Commonly known as:  Asaf Marts Take 1 Tab by mouth two (2) times a day. ergocalciferol 50,000 unit capsule Commonly known as:  ERGOCALCIFEROL Take 1 Cap by mouth every seven (7) days. ibuprofen 200 mg tablet Commonly known as:  MOTRIN Take 400 mg by mouth every eight (8) hours as needed for Pain. lisinopril 10 mg tablet Commonly known as:  Lula [place] Take 1 Tab by mouth daily. multivitamin tablet Commonly known as:  ONE A DAY Take 1 Tab by mouth daily. Prescriptions Printed Refills ALPRAZolam (XANAX) 1 mg tablet 0 Sig: Take 0.5 Tabs by mouth three (3) times daily as needed for Anxiety. Max Daily Amount: 1.5 mg.  
 Class: Print Route: Oral  
  
Follow-up Instructions Return in about 2 days (around 6/27/2018) for Medication Check, ADHD/ADD, Depression. Patient Instructions Adjustment Disorder: Care Instructions Your Care Instructions Adjustment disorder means that you have emotional or behavioral problems because of stress. But your response to the stress is far more severe than a normal response. It is severe enough to affect your work or social life and may cause depression and physical pains and problems. Events that may cause this response can include a divorce, money problems, or starting school or a new job. It might be anything that causes some stress. This disorder is most often a short-term problem. It happens within 3 months of the stressful event or change. If the response lasts longer than 6 months after the event ends, you may have a more serious disorder. Follow-up care is a key part of your treatment and safety.  Be sure to make and go to all appointments, and call your doctor if you are having problems. It's also a good idea to know your test results and keep a list of the medicines you take. How can you care for yourself at home? · Go to all counseling sessions. Do not skip any because you are feeling better. · If your doctor prescribed medicines, take them exactly as prescribed. Call your doctor if you think you are having a problem with your medicine. You will get more details on the specific medicines your doctor prescribes. · Discuss the causes of your stress with a good friend or family member. Or you can join a support group for people with similar problems. Talking to others sometimes relieves stress. · Get at least 30 minutes of exercise on most days of the week. Walking is a good choice. You also may want to do other activities, such as running, swimming, cycling, or playing tennis or team sports. Relaxation techniques Do relaxation exercises 10 to 20 minutes a day. You can play soothing, relaxing music while you do them, if you wish. · Tell others in your house that you are going to do your relaxation exercises. Ask them not to disturb you. · Find a comfortable, quiet place. · Lie down on your back, or sit with your back straight. · Focus on your breathing. Make it slow and steady. · Breathe in through your nose. Breathe out through either your nose or mouth. · Breathe deeply, filling up the area between your navel and your rib cage. Breathe so that your belly goes up and down. · Do not hold your breath. · Breathe like this for 5 to 10 minutes. Notice the feeling of calmness throughout your whole body. As you continue to breathe slowly and deeply, relax by doing these next steps for another 5 to 10 minutes: · Tighten and relax each muscle group in your body. Start at your toes, and work your way up to your head. · Imagine your muscle groups relaxing and getting heavy. · Empty your mind of all thoughts. · Let yourself relax more and more deeply. · Be aware of the state of calmness that surrounds you. · When your relaxation time is over, you can bring yourself back to alertness by moving your fingers and toes. Then move your hands and feet. And then move your entire body. Sometimes people fall asleep during relaxation. But they most often wake up soon. · Always give yourself time to return to full alertness before you drive a car. Wait to do anything that might cause an accident if you are not fully alert. Never play a relaxation tape while you drive a car. When should you call for help? Call 911 anytime you think you may need emergency care. For example, call if: 
? · You feel you cannot stop from hurting yourself or someone else. Keep the numbers for these national suicide hotlines: 2-769-767-TALK (6-990-640-932-348-8732) and 0-485-WINAQAO (1-773.973.1952). If you or someone you know talks about suicide or feeling hopeless, get help right away. ? Watch closely for changes in your health, and be sure to contact your doctor if: 
? · You have new anxiety, or your anxiety gets worse. ? · You have been feeling sad, depressed, or hopeless or have lost interest in things that you usually enjoy. ? · You do not get better as expected. Where can you learn more? Go to http://charissa-maria elena.info/. Enter [id number] in the search box to learn more about \"Adjustment Disorder: Care Instructions. \" Current as of: July 26, 2016 Content Version: 11.4 © 7639-9763 [employer]. Care instructions adapted under license by [employer] (which disclaims liability or warranty for this information). If you have questions about a medical condition or this instruction, always ask your healthcare professional. [address] any warranty or liability for your use of this information. Introducing [facility]! Bernice Fuller introduces [employer] patient portal. Now you can access parts of your medical record, email your doctor's office, and request medication refills online. 1. In your internet browser, go to [email or web address] 2. Click on the First Time User? Click Here link in the Sign In box. You will see the New Member Sign Up page. 3. Enter your [employer] Access Code exactly as it appears below. You will not need to use this code after youve completed the sign-up process. If you do not sign up before the expiration date, you must request a new code. · [employer] Access Code: [email or web address] Expires: 9/10/2018  4:04 PM 
 
4. Enter the last four digits of your Social Security Number (xxxx) and Date of Birth (mm/dd/yyyy) as indicated and click Submit. You will be taken to the next sign-up page. 5. Create a [employer] ID. This will be your [employer] login ID and cannot be changed, so think of one that is secure and easy to remember. 6. Create a [employer] password. You can change your password at any time. 7. Enter your Password Reset Question and Answer. This can be used at a later time if you forget your password. 8. Enter your e-mail address. You will receive e-mail notification when new information is available in [address] 9. Click Sign Up. You can now view and download portions of your medical record. 10. Click the Download Summary menu link to download a portable copy of your medical information. If you have questions, please visit the Frequently Asked Questions section of the [employer] website. Remember, [employer] is NOT to be used for urgent needs. For medical emergencies, dial 911. Now available from your iPhone and Android! Please provide this summary of care documentation to your next provider. Your primary care clinician is listed as Carlos Harkins. Lubna Medina. If you have any questions after today's visit, please call [phone].

## 2018-06-25 NOTE — PROGRESS NOTES
Chief Complaint   Patient presents with    Form Completion     [place] Paperwork    Attention Deficit Disorder    Depression     1. Have you been to the ER, urgent care clinic since your last visit? Hospitalized since your last visit? NO    2. Have you seen or consulted any other health care providers outside of the [address] [place] [address] since your last visit? Include any pap smears or colon screening.  NO

## 2018-06-27 ENCOUNTER — OFFICE VISIT (OUTPATIENT)
Dept: FAMILY MEDICINE CLINIC | Age: 43
End: 2018-06-27

## 2018-06-27 VITALS
RESPIRATION RATE: 14 BRPM | WEIGHT: 251.2 LBS | TEMPERATURE: 98.5 F | SYSTOLIC BLOOD PRESSURE: 116 MMHG | DIASTOLIC BLOOD PRESSURE: 83 MMHG | HEIGHT: 77 IN | BODY MASS INDEX: 29.66 KG/M2 | HEART RATE: 82 BPM | OXYGEN SATURATION: 98 %

## 2018-06-27 DIAGNOSIS — R53.83 FATIGUE, UNSPECIFIED TYPE: ICD-10-CM

## 2018-06-27 DIAGNOSIS — E55.9 VITAMIN D DEFICIENCY: ICD-10-CM

## 2018-06-27 DIAGNOSIS — F43.23 ADJUSTMENT REACTION WITH ANXIETY AND DEPRESSION: Primary | ICD-10-CM

## 2018-06-27 DIAGNOSIS — E53.8 VITAMIN B12 DEFICIENCY: ICD-10-CM

## 2018-06-27 DIAGNOSIS — F15.10 AMPHETAMINE ABUSE, EPISODIC (HCC): ICD-10-CM

## 2018-06-27 DIAGNOSIS — I10 ESSENTIAL HYPERTENSION: ICD-10-CM

## 2018-06-27 DIAGNOSIS — Z13.1 DIABETES MELLITUS SCREENING: ICD-10-CM

## 2018-06-27 DIAGNOSIS — E78.00 HYPERCHOLESTEROLEMIA: ICD-10-CM

## 2018-06-27 DIAGNOSIS — F32.1 MODERATE MAJOR DEPRESSION (HCC): ICD-10-CM

## 2018-06-27 DIAGNOSIS — E03.9 HYPOTHYROIDISM, UNSPECIFIED TYPE: ICD-10-CM

## 2018-06-27 NOTE — PROGRESS NOTES
1. Have you been to the ER, urgent care clinic since your last visit? Hospitalized since your last visit? No    2. Have you seen or consulted any other health care providers outside of the [address] [place] [address] since your last visit? Include any pap smears or colon screening.  No     Chief Complaint   Patient presents with   [employer] [place] Medication Evaluation    Labs

## 2018-06-27 NOTE — MR AVS SNAPSHOT
[address] [place] [address] 
[address] [facility] [place] [address] 
[phone] Patient: Kymberly Barclay MRN: [id number] :1975 Visit Information Date & Time Provider Department Dept. Phone Encounter #  
 2018  9:40 AM Manfred Leach NP formerly [facility] [id number] Follow-up Instructions Return in about 2 days (around 2018) for Medication Check, Paperwork, Lab F/U. Upcoming Health Maintenance Date Due Pneumococcal 19-64 Highest Risk (1 of 3 - PCV13) 2018* DTaP/Tdap/Td series (1 - Tdap) 2019* Influenza Age 5 to Adult 2018 *Topic was postponed. The date shown is not the original due date. Allergies as of 2018  Review Complete On: 2018 By: Manfred Leach NP No Known Allergies Current Immunizations  Reviewed on 2018 No immunizations on file. Not reviewed this visit You Were Diagnosed With   
  
 Codes Comments Adjustment reaction with anxiety and depression    -  Primary ICD-10-CM: F43.23 
ICD-9-CM: 309.28 Vitamin D deficiency     ICD-10-CM: E55.9 ICD-9-CM: 268.9 Amphetamine abuse, episodic     ICD-10-CM: F15.10 ICD-9-CM: 305.72 Hypercholesterolemia     ICD-10-CM: E78.00 ICD-9-CM: 272.0 Moderate major depression (HCC)     ICD-10-CM: F32.1 ICD-9-CM: 296.22 Essential hypertension     ICD-10-CM: I10 
ICD-9-CM: 401.9 Diabetes mellitus screening     ICD-10-CM: Z13.1 ICD-9-CM: V77.1 Fatigue, unspecified type     ICD-10-CM: R53.83 ICD-9-CM: 780.79 Encounter for medication monitoring     ICD-10-CM: Z51.81 
ICD-9-CM: V58.83 Vitamin B12 deficiency     ICD-10-CM: E53.8 ICD-9-CM: 266.2 Vitals BP Pulse Temp Resp Height(growth percentile) Weight(growth percentile) 116/83 (BP 1 Location: Left arm, BP Patient Position: Sitting) 82 98.5 °F (36.9 °C) (Oral) 14 6' 4.5\" (1.943 m) 251 lb 3.2 oz (113.9 kg) SpO2 BMI Smoking Status 98% 30.18 kg/m2 Former Smoker Vitals History BMI and BSA Data Body Mass Index Body Surface Area  
 30.18 kg/m 2 2.48 m 2 Preferred Pharmacy Pharmacy Name Phone [facility] [address] [phone] Your Updated Medication List  
  
   
This list is accurate as of 6/27/18 11:08 AM.  Always use your most recent med list.  
  
  
  
  
 ALPRAZolam 1 mg tablet Commonly known as:  Donata Fess Take 0.5 Tabs by mouth three (3) times daily as needed for Anxiety. Max Daily Amount: 1.5 mg.  
  
 buPROPion  mg SR tablet Commonly known as:  Tawnya Austyn Take 1 Tab by mouth two (2) times a day. ergocalciferol 50,000 unit capsule Commonly known as:  ERGOCALCIFEROL Take 1 Cap by mouth every seven (7) days. ibuprofen 200 mg tablet Commonly known as:  MOTRIN Take 400 mg by mouth every eight (8) hours as needed for Pain. lisinopril 10 mg tablet Commonly known as:  Burma Fairy Take 1 Tab by mouth daily. multivitamin tablet Commonly known as:  ONE A DAY Take 1 Tab by mouth daily. We Performed the Following AMB POC EKG ROUTINE W/ 12 LEADS, INTER & REP [16619 CPT(R)] CBC WITH AUTOMATED DIFF [40690 CPT(R)] [address] [place] [address] Street MONITORING [[id number] Custom] HEMOGLOBIN A1C WITH EAG [99235 CPT(R)] LIPID PANEL [94753 CPT(R)] METABOLIC PANEL, COMPREHENSIVE [22842 CPT(R)] TSH 3RD GENERATION [31706 CPT(R)] URINALYSIS W/ RFLX MICROSCOPIC [13250 CPT(R)] VITAMIN B12 [id number] CPT(R)] VITAMIN D, 25 HYDROXY [id number] CPT(R)] Follow-up Instructions Return in about 2 days (around 6/29/2018) for Medication Check, Paperwork, Lab F/U. Patient Instructions Adjustment Disorder: Care Instructions Your Care Instructions Adjustment disorder means that you have emotional or behavioral problems because of stress. But your response to the stress is far more severe than a normal response. It is severe enough to affect your work or social life and may cause depression and physical pains and problems. Events that may cause this response can include a divorce, money problems, or starting school or a new job. It might be anything that causes some stress. This disorder is most often a short-term problem. It happens within 3 months of the stressful event or change. If the response lasts longer than 6 months after the event ends, you may have a more serious disorder. Follow-up care is a key part of your treatment and safety. Be sure to make and go to all appointments, and call your doctor if you are having problems. It's also a good idea to know your test results and keep a list of the medicines you take. How can you care for yourself at home? · Go to all counseling sessions. Do not skip any because you are feeling better. · If your doctor prescribed medicines, take them exactly as prescribed. Call your doctor if you think you are having a problem with your medicine. You will get more details on the specific medicines your doctor prescribes. · Discuss the causes of your stress with a good friend or family member. Or you can join a support group for people with similar problems. Talking to others sometimes relieves stress. · Get at least 30 minutes of exercise on most days of the week. Walking is a good choice. You also may want to do other activities, such as running, swimming, cycling, or playing tennis or team sports. Relaxation techniques Do relaxation exercises 10 to 20 minutes a day. You can play soothing, relaxing music while you do them, if you wish. · Tell others in your house that you are going to do your relaxation exercises. Ask them not to disturb you. · Find a comfortable, quiet place. · Lie down on your back, or sit with your back straight. · Focus on your breathing. Make it slow and steady. · Breathe in through your nose. Breathe out through either your nose or mouth. · Breathe deeply, filling up the area between your navel and your rib cage. Breathe so that your belly goes up and down. · Do not hold your breath. · Breathe like this for 5 to 10 minutes. Notice the feeling of calmness throughout your whole body. As you continue to breathe slowly and deeply, relax by doing these next steps for another 5 to 10 minutes: · Tighten and relax each muscle group in your body. Start at your toes, and work your way up to your head. · Imagine your muscle groups relaxing and getting heavy. · Empty your mind of all thoughts. · Let yourself relax more and more deeply. · Be aware of the state of calmness that surrounds you. · When your relaxation time is over, you can bring yourself back to alertness by moving your fingers and toes. Then move your hands and feet. And then move your entire body. Sometimes people fall asleep during relaxation. But they most often wake up soon. · Always give yourself time to return to full alertness before you drive a car. Wait to do anything that might cause an accident if you are not fully alert. Never play a relaxation tape while you drive a car. When should you call for help? Call 911 anytime you think you may need emergency care. For example, call if: 
? · You feel you cannot stop from hurting yourself or someone else. Keep the numbers for these national suicide hotlines: 9-888-762-TALK (6-666.351.7856) and 4-450-WUDDEKR (3-564.738.5021). If you or someone you know talks about suicide or feeling hopeless, get help right away. ? Watch closely for changes in your health, and be sure to contact your doctor if: 
? · You have new anxiety, or your anxiety gets worse.   
? · You have been feeling sad, depressed, or hopeless or have lost interest in things that you usually enjoy. ? · You do not get better as expected. Where can you learn more? Go to http://charissa-maria elena.info/. Enter [id number] in the search box to learn more about \"Adjustment Disorder: Care Instructions. \" Current as of: July 26, 2016 Content Version: 11.4 © 2803-8973 [employer]. Care instructions adapted under license by [employer] (which disclaims liability or warranty for this information). If you have questions about a medical condition or this instruction, always ask your healthcare professional. [place] [address] any warranty or liability for your use of this information. High Blood Pressure: Care Instructions Your Care Instructions If your blood pressure is usually above 140/90, you have high blood pressure, or hypertension. That means the top number is 140 or higher or the bottom number is 90 or higher, or both. Despite what a lot of people think, high blood pressure usually doesn't cause headaches or make you feel dizzy or lightheaded. It usually has no symptoms. But it does increase your risk for heart attack, stroke, and kidney or eye damage. The higher your blood pressure, the more your risk increases. Your doctor will give you a goal for your blood pressure. Your goal will be based on your health and your age. An example of a goal is to keep your blood pressure below 140/90. Lifestyle changes, such as eating healthy and being active, are always important to help lower blood pressure. You might also take medicine to reach your blood pressure goal. 
Follow-up care is a key part of your treatment and safety. Be sure to make and go to all appointments, and call your doctor if you are having problems. It's also a good idea to know your test results and keep a list of the medicines you take. How can you care for yourself at home? Medical treatment · If you stop taking your medicine, your blood pressure will go back up. You may take one or more types of medicine to lower your blood pressure. Be safe with medicines. Take your medicine exactly as prescribed. Call your doctor if you think you are having a problem with your medicine. · Talk to your doctor before you start taking aspirin every day. Aspirin can help certain people lower their risk of a heart attack or stroke. But taking aspirin isn't right for everyone, because it can cause serious bleeding. · See your doctor regularly. You may need to see the doctor more often at first or until your blood pressure comes down. · If you are taking blood pressure medicine, talk to your doctor before you take decongestants or anti-inflammatory medicine, such as ibuprofen. Some of these medicines can raise blood pressure. · Learn how to check your blood pressure at home. Lifestyle changes · Stay at a healthy weight. This is especially important if you put on weight around the waist. Losing even 10 pounds can help you lower your blood pressure. · If your doctor recommends it, get more exercise. Walking is a good choice. Bit by bit, increase the amount you walk every day. Try for at least 30 minutes on most days of the week. You also may want to swim, bike, or do other activities. · Avoid or limit alcohol. Talk to your doctor about whether you can drink any alcohol. · Try to limit how much sodium you eat to less than 2,300 milligrams (mg) a day. Your doctor may ask you to try to eat less than 1,500 mg a day. · Eat plenty of fruits (such as bananas and oranges), vegetables, legumes, whole grains, and low-fat dairy products. · Lower the amount of saturated fat in your diet. Saturated fat is found in animal products such as milk, cheese, and meat. Limiting these foods may help you lose weight and also lower your risk for heart disease. · Do not smoke. Smoking increases your risk for heart attack and stroke. If you need help quitting, talk to your doctor about stop-smoking programs and medicines. These can increase your chances of quitting for good. When should you call for help? Call 911 anytime you think you may need emergency care. This may mean having symptoms that suggest that your blood pressure is causing a serious heart or blood vessel problem. Your blood pressure may be over 180/110. ? For example, call 911 if: 
? · You have symptoms of a heart attack. These may include: ¨ Chest pain or pressure, or a strange feeling in the chest. 
¨ Sweating. ¨ Shortness of breath. ¨ Nausea or vomiting. ¨ Pain, pressure, or a strange feeling in the back, neck, jaw, or upper belly or in one or both shoulders or arms. ¨ Lightheadedness or sudden weakness. ¨ A fast or irregular heartbeat. ? · You have symptoms of a stroke. These may include: 
¨ Sudden numbness, tingling, weakness, or loss of movement in your face, arm, or leg, especially on only one side of your body. ¨ Sudden vision changes. ¨ Sudden trouble speaking. ¨ Sudden confusion or trouble understanding simple statements. ¨ Sudden problems with walking or balance. ¨ A sudden, severe headache that is different from past headaches. ? · You have severe back or belly pain. ?Do not wait until your blood pressure comes down on its own. Get help right away. ?Call your doctor now or seek immediate care if: 
? · Your blood pressure is much higher than normal (such as 180/110 or higher), but you don't have symptoms. ? · You think high blood pressure is causing symptoms, such as: ¨ Severe headache. ¨ Blurry vision. ? Watch closely for changes in your health, and be sure to contact your doctor if: 
? · Your blood pressure measures 140/90 or higher at least 2 times. That means the top number is 140 or higher or the bottom number is 90 or higher, or both. ? · You think you may be having side effects from your blood pressure medicine. ? · Your blood pressure is usually normal, but it goes above normal at least 2 times. Where can you learn more? Go to http://charissa-maria elena.info/. Enter B455 in the search box to learn more about \"High Blood Pressure: Care Instructions. \" Current as of: September 21, 2016 Content Version: 11.4 © 7226-7070 [employer]. Care instructions adapted under license by [employer] (which disclaims liability or warranty for this information). If you have questions about a medical condition or this instruction, always ask your healthcare professional. [address] any warranty or liability for your use of this information. Introducing [facility]! [employer] introduces ANPI patient portal. Now you can access parts of your medical record, email your doctor's office, and request medication refills online. 1. In your internet browser, go to [email or web address] 2. Click on the First Time User? Click Here link in the Sign In box. You will see the New Member Sign Up page. 3. Enter your ANPI Access Code exactly as it appears below. You will not need to use this code after youve completed the sign-up process. If you do not sign up before the expiration date, you must request a new code. · ANPI Access Code: [email or web address] Expires: 9/10/2018  4:04 PM 
 
4. Enter the last four digits of your Social Security Number (xxxx) and Date of Birth (mm/dd/yyyy) as indicated and click Submit. You will be taken to the next sign-up page. 5. Create a TapTrackt ID. This will be your ANPI login ID and cannot be changed, so think of one that is secure and easy to remember. 6. Create a TapTrackt password. You can change your password at any time. 7. Enter your Password Reset Question and Answer. This can be used at a later time if you forget your password. 8. Enter your e-mail address.  You will receive e-mail notification when new information is available in [facility]. 9. Click Sign Up. You can now view and download portions of your medical record. 10. Click the Download Summary menu link to download a portable copy of your medical information. If you have questions, please visit the Frequently Asked Questions section of the [employer] website. Remember, [employer] is NOT to be used for urgent needs. For medical emergencies, dial 911. Now available from your iPhone and Android! Please provide this summary of care documentation to your next provider. Your primary care clinician is listed as Teresa Lam. Cite Ettataouprosper. If you have any questions after today's visit, please call [phone].

## 2018-06-27 NOTE — PATIENT INSTRUCTIONS
Adjustment Disorder: Care Instructions  Your Care Instructions    Adjustment disorder means that you have emotional or behavioral problems because of stress. But your response to the stress is far more severe than a normal response. It is severe enough to affect your work or social life and may cause depression and physical pains and problems. Events that may cause this response can include a divorce, money problems, or starting school or a new job. It might be anything that causes some stress. This disorder is most often a short-term problem. It happens within 3 months of the stressful event or change. If the response lasts longer than 6 months after the event ends, you may have a more serious disorder. Follow-up care is a key part of your treatment and safety. Be sure to make and go to all appointments, and call your doctor if you are having problems. It's also a good idea to know your test results and keep a list of the medicines you take. How can you care for yourself at home? · Go to all counseling sessions. Do not skip any because you are feeling better. · If your doctor prescribed medicines, take them exactly as prescribed. Call your doctor if you think you are having a problem with your medicine. You will get more details on the specific medicines your doctor prescribes. · Discuss the causes of your stress with a good friend or family member. Or you can join a support group for people with similar problems. Talking to others sometimes relieves stress. · Get at least 30 minutes of exercise on most days of the week. Walking is a good choice. You also may want to do other activities, such as running, swimming, cycling, or playing tennis or team sports. Relaxation techniques  Do relaxation exercises 10 to 20 minutes a day. You can play soothing, relaxing music while you do them, if you wish. · Tell others in your house that you are going to do your relaxation exercises.  Ask them not to disturb you.  · Find a comfortable, quiet place. · Lie down on your back, or sit with your back straight. · Focus on your breathing. Make it slow and steady. · Breathe in through your nose. Breathe out through either your nose or mouth. · Breathe deeply, filling up the area between your navel and your rib cage. Breathe so that your belly goes up and down. · Do not hold your breath. · Breathe like this for 5 to 10 minutes. Notice the feeling of calmness throughout your whole body. As you continue to breathe slowly and deeply, relax by doing these next steps for another 5 to 10 minutes:  · Tighten and relax each muscle group in your body. Start at your toes, and work your way up to your head. · Imagine your muscle groups relaxing and getting heavy. · Empty your mind of all thoughts. · Let yourself relax more and more deeply. · Be aware of the state of calmness that surrounds you. · When your relaxation time is over, you can bring yourself back to alertness by moving your fingers and toes. Then move your hands and feet. And then move your entire body. Sometimes people fall asleep during relaxation. But they most often wake up soon. · Always give yourself time to return to full alertness before you drive a car. Wait to do anything that might cause an accident if you are not fully alert. Never play a relaxation tape while you drive a car. When should you call for help? Call 911 anytime you think you may need emergency care. For example, call if:  ? · You feel you cannot stop from hurting yourself or someone else. Keep the numbers for these national suicide hotlines: 3-585-771-TALK (5-879-750-104.744.8512) and 8-947-DSFPEJA (5-781.570.5083). If you or someone you know talks about suicide or feeling hopeless, get help right away. ? Watch closely for changes in your health, and be sure to contact your doctor if:  ? · You have new anxiety, or your anxiety gets worse.    ? · You have been feeling sad, depressed, or hopeless or have lost interest in things that you usually enjoy. ? · You do not get better as expected. Where can you learn more? Go to http://charissa-maria elena.info/. Enter [id number] [id number] in the search box to learn more about \"Adjustment Disorder: Care Instructions. \"  Current as of: July 26, 2016  Content Version: 11.4  © 8220-6486 [employer]. Care instructions adapted under license by [employer] (which disclaims liability or warranty for this information). If you have questions about a medical condition or this instruction, always ask your healthcare professional. [place] [address] any warranty or liability for your use of this information. High Blood Pressure: Care Instructions  Your Care Instructions    If your blood pressure is usually above 140/90, you have high blood pressure, or hypertension. That means the top number is 140 or higher or the bottom number is 90 or higher, or both. Despite what a lot of people think, high blood pressure usually doesn't cause headaches or make you feel dizzy or lightheaded. It usually has no symptoms. But it does increase your risk for heart attack, stroke, and kidney or eye damage. The higher your blood pressure, the more your risk increases. Your doctor will give you a goal for your blood pressure. Your goal will be based on your health and your age. An example of a goal is to keep your blood pressure below 140/90. Lifestyle changes, such as eating healthy and being active, are always important to help lower blood pressure. You might also take medicine to reach your blood pressure goal.  Follow-up care is a key part of your treatment and safety. Be sure to make and go to all appointments, and call your doctor if you are having problems. It's also a good idea to know your test results and keep a list of the medicines you take. How can you care for yourself at home?   Medical treatment  · If you stop taking your medicine, your blood pressure will go back up. You may take one or more types of medicine to lower your blood pressure. Be safe with medicines. Take your medicine exactly as prescribed. Call your doctor if you think you are having a problem with your medicine. · Talk to your doctor before you start taking aspirin every day. Aspirin can help certain people lower their risk of a heart attack or stroke. But taking aspirin isn't right for everyone, because it can cause serious bleeding. · See your doctor regularly. You may need to see the doctor more often at first or until your blood pressure comes down. · If you are taking blood pressure medicine, talk to your doctor before you take decongestants or anti-inflammatory medicine, such as ibuprofen. Some of these medicines can raise blood pressure. · Learn how to check your blood pressure at home. Lifestyle changes  · Stay at a healthy weight. This is especially important if you put on weight around the waist. Losing even 10 pounds can help you lower your blood pressure. · If your doctor recommends it, get more exercise. Walking is a good choice. Bit by bit, increase the amount you walk every day. Try for at least 30 minutes on most days of the week. You also may want to swim, bike, or do other activities. · Avoid or limit alcohol. Talk to your doctor about whether you can drink any alcohol. · Try to limit how much sodium you eat to less than 2,300 milligrams (mg) a day. Your doctor may ask you to try to eat less than 1,500 mg a day. · Eat plenty of fruits (such as bananas and oranges), vegetables, legumes, whole grains, and low-fat dairy products. · Lower the amount of saturated fat in your diet. Saturated fat is found in animal products such as milk, cheese, and meat. Limiting these foods may help you lose weight and also lower your risk for heart disease. · Do not smoke. Smoking increases your risk for heart attack and stroke.  If you need help quitting, talk to your doctor about stop-smoking programs and medicines. These can increase your chances of quitting for good. When should you call for help? Call 911 anytime you think you may need emergency care. This may mean having symptoms that suggest that your blood pressure is causing a serious heart or blood vessel problem. Your blood pressure may be over 180/110. ? For example, call 911 if:  ? · You have symptoms of a heart attack. These may include:  ¨ Chest pain or pressure, or a strange feeling in the chest.  ¨ Sweating. ¨ Shortness of breath. ¨ Nausea or vomiting. ¨ Pain, pressure, or a strange feeling in the back, neck, jaw, or upper belly or in one or both shoulders or arms. ¨ Lightheadedness or sudden weakness. ¨ A fast or irregular heartbeat. ? · You have symptoms of a stroke. These may include:  ¨ Sudden numbness, tingling, weakness, or loss of movement in your face, arm, or leg, especially on only one side of your body. ¨ Sudden vision changes. ¨ Sudden trouble speaking. ¨ Sudden confusion or trouble understanding simple statements. ¨ Sudden problems with walking or balance. ¨ A sudden, severe headache that is different from past headaches. ? · You have severe back or belly pain. ?Do not wait until your blood pressure comes down on its own. Get help right away. ?Call your doctor now or seek immediate care if:  ? · Your blood pressure is much higher than normal (such as 180/110 or higher), but you don't have symptoms. ? · You think high blood pressure is causing symptoms, such as:  ¨ Severe headache. ¨ Blurry vision. ? Watch closely for changes in your health, and be sure to contact your doctor if:  ? · Your blood pressure measures 140/90 or higher at least 2 times. That means the top number is 140 or higher or the bottom number is 90 or higher, or both. ? · You think you may be having side effects from your blood pressure medicine.    ? · Your blood pressure is usually normal, but it goes above normal at least 2 times. Where can you learn more? Go to http://charissa-maria elena.info/. Enter G680 in the search box to learn more about \"High Blood Pressure: Care Instructions. \"  Current as of: September 21, 2016  Content Version: 11.4  © 8235-6469 [employer]. Care instructions adapted under license by [employer] (which disclaims liability or warranty for this information). If you have questions about a medical condition or this instruction, always ask your healthcare professional. [address] any warranty or liability for your use of this information.

## 2018-06-27 NOTE — PROGRESS NOTES
Chief Complaint   Patient presents with    Medication Evaluation    Labs     HPI:  The patient is a 40 y.o. male who presents today for a follow up appointment. No hospital, ER or specialist visits since last primary care visit except as noted below. ADD:  The patient returns today s/p 3 days without Amphetamines. Taking Xanax PRN. Pt reports he \"feels better\". Depression:  He has been taking Wellbutrin BID with great improvement. He does report fatigue, states he previous PCP gave him a Vit B12 shot with good effectiveness. HTN:  He has not been taking Lisinopril x 3 days. He is normotensive in the office today. Review of Systems  A comprehensive review of systems was negative except for that written in the HPI.     Patient Active Problem List   Diagnosis Code    Chronic tension-type headache, intractable G44.221    Migraine without aura and without status migrainosus, not intractable G43.009    Adjustment reaction with anxiety and depression F43.23    Vitamin D deficiency E55.9    Hypercholesterolemia E78.00    Hypertension I10    GERD (gastroesophageal reflux disease) K21.9    ADD (attention deficit disorder) without hyperactivity F98.8    Moderate major depression (HCC) F32.1    Amphetamine abuse, episodic F15.10    Vitamin B12 deficiency E53.8       Past Medical History:   Diagnosis Date    ADD (attention deficit disorder) without hyperactivity 6/7/2018    Adjustment reaction with anxiety and depression     Attention deficit hyperactivity disorder (ADHD), predominantly inattentive type 6/7/2018    Cancer ([facility] [address]     testicular     Cancer ([facility] [address] 2010    Testicular    Community acquired pneumonia     Depression     GERD (gastroesophageal reflux disease)     Headache     Hypercholesterolemia     Hypertension     Vitamin D deficiency        Past Surgical History:   Procedure Laterality Date    HX ORCHIECTOMY Right 2010    HX UROLOGICAL      removed on e testicle    HX [address]  1996       Social History   Substance Use Topics    Smoking status: Former Smoker     Packs/day: 1.00     Years: 8.00     Types: Cigarettes     Quit date: 2008    Smokeless tobacco: Never Used    Alcohol use 6.0 oz/week     24 Cans of beer per week       Family History   Problem Relation Age of Onset    Cancer Mother      Ovarian    Hypertension Mother     Cancer Father      Throat    Hypertension Father     Arrhythmia Brother      Afib    No Known Problems Maternal Grandmother     No Known Problems Maternal Grandfather     No Known Problems Paternal Grandmother     No Known Problems Paternal Grandfather        Outpatient Prescriptions Marked as Taking for the 6/27/18 encounter (Office Visit) with Yue Maciel NP   Medication Sig Dispense Refill    ALPRAZolam (XANAX) 1 mg tablet Take 0.5 Tabs by mouth three (3) times daily as needed for Anxiety. Max Daily Amount: 1.5 mg. 10 Tab 0    lisinopril (PRINIVIL, ZESTRIL) 10 mg tablet Take 1 Tab by mouth daily. 90 Tab 3    multivitamin (ONE A DAY) tablet Take 1 Tab by mouth daily.  ibuprofen (MOTRIN) 200 mg tablet Take 400 mg by mouth every eight (8) hours as needed for Pain.  ergocalciferol (ERGOCALCIFEROL) 50,000 unit capsule Take 1 Cap by mouth every seven (7) days. 5 Cap 2    buPROPion SR (WELLBUTRIN SR) 150 mg SR tablet Take 1 Tab by mouth two (2) times a day.  60 Tab 0     No Known Allergies    PE:  Visit Vitals    /83 (BP 1 Location: Left arm, BP Patient Position: Sitting)    Pulse 82    Temp 98.5 °F (36.9 °C) (Oral)    Resp 14    Ht 6' 4.5\" (1.943 m)    Wt 251 lb 3.2 oz (113.9 kg)    SpO2 98%    BMI 30.18 kg/m2     EKG Results     Procedure 720 Value Units Date/Time    AMB POC EKG ROUTINE W/ 12 LEADS, INTER & REP [[id number]] Collected:  06/27/18 1017    Order Status:  Completed Updated:  06/27/18 1017      Sinus Bradycardia w/ no abnormalities    Gen: alert, oriented, no acute distress  Head: normocephalic, atraumatic  Ears: external auditory canals clear, TMs without erythema or effusion  Eyes: pupils equal round reactive to light, sclera clear, conjunctiva clear  Oral: moist mucus membranes, no oral lesions, no pharyngeal inflammation or exudate  Neck: symmetric normal sized thyroid, no carotid bruits, no jugular vein distention  Resp: no increase work of breathing, lungs clear to ausculation bilaterally, no wheezing, rales or rhonchi  CV: S1, S2 normal.  No murmurs, rubs, or gallops. Abd: soft, not tender, not distended. No hepatosplenomegaly. Normal bowel sounds. No hernias. No abdominal or renal bruits. Neuro: cranial nerves intact, normal strength and movement in all extremities, reflexes and sensation intact and symmetric. Skin: no lesion or rash  Extremities: no cyanosis or edema    Assessment/Plan:    ICD-10-CM ICD-9-CM    1. Adjustment reaction with anxiety and depression F43.23 309.28 URINALYSIS W/ RFLX MICROSCOPIC      LIPID PANEL      METABOLIC PANEL, COMPREHENSIVE      TSH 3RD GENERATION      COMPLIANCE DRUG SCREEN/PRESCRIPTION MONITORING      VITAMIN D, 25 HYDROXY      HEMOGLOBIN A1C WITH EAG      CBC WITH AUTOMATED DIFF   2. Vitamin D deficiency E55.9 268.9 VITAMIN D, 25 HYDROXY   3. Amphetamine abuse, episodic F15.10 305.72 AMB POC EKG ROUTINE W/ 12 LEADS, INTER & REP      URINALYSIS W/ RFLX MICROSCOPIC      LIPID PANEL      METABOLIC PANEL, COMPREHENSIVE      TSH 3RD GENERATION      COMPLIANCE DRUG SCREEN/PRESCRIPTION MONITORING      VITAMIN D, 25 HYDROXY      HEMOGLOBIN A1C WITH EAG      CBC WITH AUTOMATED DIFF   4. Hypercholesterolemia E78.00 272.0 LIPID PANEL   5. Moderate major depression (HCC) F32.1 296.22 URINALYSIS W/ RFLX MICROSCOPIC      LIPID PANEL      METABOLIC PANEL, COMPREHENSIVE      TSH 3RD GENERATION      COMPLIANCE DRUG SCREEN/PRESCRIPTION MONITORING      VITAMIN D, 25 HYDROXY      HEMOGLOBIN A1C WITH EAG      CBC WITH AUTOMATED DIFF   6.  Essential hypertension I10 401.9 URINALYSIS W/ RFLX MICROSCOPIC      LIPID PANEL      METABOLIC PANEL, COMPREHENSIVE      TSH 3RD GENERATION      COMPLIANCE DRUG SCREEN/PRESCRIPTION MONITORING      VITAMIN D, 25 HYDROXY      HEMOGLOBIN A1C WITH EAG      CBC WITH AUTOMATED DIFF   7. Diabetes mellitus screening Z13.1 V77.1 URINALYSIS W/ RFLX MICROSCOPIC      METABOLIC PANEL, COMPREHENSIVE      HEMOGLOBIN A1C WITH EAG   8. Fatigue, unspecified type R53.83 780.79 URINALYSIS W/ RFLX MICROSCOPIC      LIPID PANEL      METABOLIC PANEL, COMPREHENSIVE      TSH 3RD GENERATION      COMPLIANCE DRUG SCREEN/PRESCRIPTION MONITORING      VITAMIN D, 25 HYDROXY      HEMOGLOBIN A1C WITH EAG      CBC WITH AUTOMATED DIFF   9. Encounter for medication monitoring Z51.81 V58.83 COMPLIANCE DRUG SCREEN/PRESCRIPTION MONITORING   10. Vitamin B12 deficiency E53.8 266.2 VITAMIN B12     Follow-up Disposition:  Return in about 2 days (around 6/29/2018) for Medication Check, Paperwork, Lab F/U.  lab results and schedule of future lab studies reviewed with patient  reviewed diet, exercise and weight control  cardiovascular risk and specific lipid/LDL goals reviewed  reviewed medications and side effects in detail    Health Maintenance reviewed - reviewed, UTD. Recommended healthy diet low in carbohydrates, fats, sodium and cholesterol. Recommended regular cardiovascular exercise 3-6 times per week for 30-60 minutes daily. Chart is reviewed and updated today in the office. Records requested for other providers patient has seen and is currently seeing. Patient was offered a choice/choices in the treatment plan today. Patient expresses understanding of the plan and agrees with recommendations. Verbal and written instructions (see AVS) provided. See patient instructions for more. Patient expresses understanding of diagnosis and treatment plan.

## 2018-06-28 LAB
25(OH)D3+25(OH)D2 SERPL-MCNC: 29.5 NG/ML (ref 30–100)
ALBUMIN SERPL-MCNC: 4.1 G/DL (ref 3.5–5.5)
ALBUMIN/GLOB SERPL: 1.6 {RATIO} (ref 1.2–2.2)
ALP SERPL-CCNC: 79 IU/L (ref 39–117)
ALT SERPL-CCNC: 39 IU/L (ref 0–44)
AST SERPL-CCNC: 16 IU/L (ref 0–40)
BASOPHILS # BLD AUTO: 0.1 X10E3/UL (ref 0–0.2)
BASOPHILS NFR BLD AUTO: 1 %
BILIRUB SERPL-MCNC: 0.3 MG/DL (ref 0–1.2)
BUN SERPL-MCNC: 10 MG/DL (ref 6–24)
BUN/CREAT SERPL: 11 (ref 9–20)
CALCIUM SERPL-MCNC: 9.2 MG/DL (ref 8.7–10.2)
CHLORIDE SERPL-SCNC: 106 MMOL/L (ref 96–106)
CHOLEST SERPL-MCNC: 223 MG/DL (ref 100–199)
CO2 SERPL-SCNC: 19 MMOL/L (ref 20–29)
CREAT SERPL-MCNC: 0.95 MG/DL (ref 0.76–1.27)
EOSINOPHIL # BLD AUTO: 0.2 X10E3/UL (ref 0–0.4)
EOSINOPHIL NFR BLD AUTO: 3 %
ERYTHROCYTE [DISTWIDTH] IN BLOOD BY AUTOMATED COUNT: 13.7 % (ref 12.3–15.4)
EST. AVERAGE GLUCOSE BLD GHB EST-MCNC: 100 MG/DL
GLOBULIN SER CALC-MCNC: 2.6 G/DL (ref 1.5–4.5)
GLUCOSE SERPL-MCNC: 87 MG/DL (ref 65–99)
HBA1C MFR BLD: 5.1 % (ref 4.8–5.6)
HCT VFR BLD AUTO: 45.1 % (ref 37.5–51)
HDLC SERPL-MCNC: 49 MG/DL
HGB BLD-MCNC: 15.7 G/DL (ref 13–17.7)
IMM GRANULOCYTES # BLD: 0 X10E3/UL (ref 0–0.1)
IMM GRANULOCYTES NFR BLD: 0 %
INTERPRETATION, 910389: NORMAL
LDLC SERPL CALC-MCNC: 144 MG/DL (ref 0–99)
LYMPHOCYTES # BLD AUTO: 2.4 X10E3/UL (ref 0.7–3.1)
LYMPHOCYTES NFR BLD AUTO: 34 %
MCH RBC QN AUTO: 30.2 PG (ref 26.6–33)
MCHC RBC AUTO-ENTMCNC: 34.8 G/DL (ref 31.5–35.7)
MCV RBC AUTO: 87 FL (ref 79–97)
MONOCYTES # BLD AUTO: 0.5 X10E3/UL (ref 0.1–0.9)
MONOCYTES NFR BLD AUTO: 8 %
NEUTROPHILS # BLD AUTO: 3.9 X10E3/UL (ref 1.4–7)
NEUTROPHILS NFR BLD AUTO: 54 %
PLATELET # BLD AUTO: 283 X10E3/UL (ref 150–379)
POTASSIUM SERPL-SCNC: 4.4 MMOL/L (ref 3.5–5.2)
PROT SERPL-MCNC: 6.7 G/DL (ref 6–8.5)
RBC # BLD AUTO: 5.2 X10E6/UL (ref 4.14–5.8)
SODIUM SERPL-SCNC: 141 MMOL/L (ref 134–144)
TRIGL SERPL-MCNC: 152 MG/DL (ref 0–149)
TSH SERPL DL<=0.005 MIU/L-ACNC: 4.34 UIU/ML (ref 0.45–4.5)
VIT B12 SERPL-MCNC: 816 PG/ML (ref 232–1245)
VLDLC SERPL CALC-MCNC: 30 MG/DL (ref 5–40)
WBC # BLD AUTO: 7.2 X10E3/UL (ref 3.4–10.8)

## 2018-06-29 ENCOUNTER — OFFICE VISIT (OUTPATIENT)
Dept: FAMILY MEDICINE CLINIC | Age: 43
End: 2018-06-29

## 2018-06-29 VITALS
RESPIRATION RATE: 18 BRPM | BODY MASS INDEX: 29.86 KG/M2 | OXYGEN SATURATION: 97 % | HEART RATE: 78 BPM | TEMPERATURE: 98.7 F | HEIGHT: 77 IN | DIASTOLIC BLOOD PRESSURE: 89 MMHG | WEIGHT: 252.9 LBS | SYSTOLIC BLOOD PRESSURE: 128 MMHG

## 2018-06-29 DIAGNOSIS — E03.9 HYPOTHYROIDISM, UNSPECIFIED TYPE: ICD-10-CM

## 2018-06-29 DIAGNOSIS — E78.00 HYPERCHOLESTEROLEMIA: ICD-10-CM

## 2018-06-29 DIAGNOSIS — E53.8 VITAMIN B12 DEFICIENCY: ICD-10-CM

## 2018-06-29 DIAGNOSIS — F43.23 ADJUSTMENT REACTION WITH ANXIETY AND DEPRESSION: Primary | ICD-10-CM

## 2018-06-29 DIAGNOSIS — E55.9 VITAMIN D DEFICIENCY: ICD-10-CM

## 2018-06-29 DIAGNOSIS — I10 ESSENTIAL HYPERTENSION: ICD-10-CM

## 2018-06-29 PROBLEM — R53.83 FATIGUE: Status: ACTIVE | Noted: 2018-06-29

## 2018-06-29 RX ORDER — ATORVASTATIN CALCIUM 20 MG/1
20 TABLET, FILM COATED ORAL DAILY
Qty: 30 TAB | Refills: 1 | Status: SHIPPED | OUTPATIENT
Start: 2018-06-29 | End: 2018-08-24 | Stop reason: SDUPTHER

## 2018-06-29 RX ORDER — CYANOCOBALAMIN 1000 UG/ML
1000 INJECTION, SOLUTION INTRAMUSCULAR; SUBCUTANEOUS ONCE
Qty: 1 ML | Refills: 0
Start: 2018-06-29 | End: 2018-06-29

## 2018-06-29 RX ORDER — LEVOTHYROXINE SODIUM 25 UG/1
25 TABLET ORAL
Qty: 30 TAB | Refills: 1 | Status: SHIPPED | OUTPATIENT
Start: 2018-06-29 | End: 2018-07-30 | Stop reason: SDUPTHER

## 2018-06-29 RX ORDER — ERGOCALCIFEROL 1.25 MG/1
50000 CAPSULE ORAL
Qty: 5 CAP | Refills: 2 | Status: SHIPPED | OUTPATIENT
Start: 2018-06-29 | End: 2018-07-30 | Stop reason: SDUPTHER

## 2018-06-29 RX ORDER — LAMOTRIGINE 25 MG/1
TABLET ORAL
Qty: 42 TAB | Refills: 0 | Status: SHIPPED | OUTPATIENT
Start: 2018-06-29 | End: 2018-07-13 | Stop reason: SDUPTHER

## 2018-06-29 NOTE — PROGRESS NOTES
Chief Complaint   Patient presents with    Other     Discuss paperwork to return to work    Dizziness     pt states he feels light headed     HPI:  The patient is a 40 y.o. male who presents today for a follow up appointment. No hospital, ER or specialist visits since last primary care visit except as noted below. ADD:  The patient returns today s/p 5 days without Amphetamines. Taking Xanax PRN. Pt reports he \"feels best today then any other day this week\". Depression:  He has been taking Wellbutrin BID with great improvement. He does report fatigue, states he previous PCP gave him a Vit B12 shot with good effectiveness. He reports considerably worsening depression this week, he reports 2-3 episodes of SI, he denies a plan and felt \"it would be ok if he did not wake up tomorrow\". HTN:  He has not been taking Lisinopril x 3 days. He is normotensive in the office today.     Labs: reviewed with the patient in the office today, started Lipitor for XOL, continued Vit D for Vit D deficiency, started synthroid 25mcg for elevated TSH  Office Visit on 06/27/2018   Component Date Value Ref Range Status    Cholesterol, total 06/27/2018 223* 100 - 199 mg/dL Final    Triglyceride 06/27/2018 152* 0 - 149 mg/dL Final    HDL Cholesterol 06/27/2018 49  >39 mg/dL Final    VLDL, calculated 06/27/2018 30  5 - 40 mg/dL Final    LDL, calculated 06/27/2018 144* 0 - 99 mg/dL Final    Glucose 06/27/2018 87  65 - 99 mg/dL Final    BUN 06/27/2018 10  6 - 24 mg/dL Final    Creatinine 06/27/2018 0.95  0.76 - 1.27 mg/dL Final    GFR est non-AA 06/27/2018 99  >59 mL/min/1.73 Final    GFR est AA 06/27/2018 114  >59 mL/min/1.73 Final    BUN/Creatinine ratio 06/27/2018 11  9 - 20 Final    Sodium 06/27/2018 141  134 - 144 mmol/L Final    Potassium 06/27/2018 4.4  3.5 - 5.2 mmol/L Final    Chloride 06/27/2018 106  96 - 106 mmol/L Final    CO2 06/27/2018 19* 20 - 29 mmol/L Final                  **Please note reference interval change**    Calcium 06/27/2018 9.2  8.7 - 10.2 mg/dL Final    Protein, total 06/27/2018 6.7  6.0 - 8.5 g/dL Final    Albumin 06/27/2018 4.1  3.5 - 5.5 g/dL Final    GLOBULIN, TOTAL 06/27/2018 2.6  1.5 - 4.5 g/dL Final    A-G Ratio 06/27/2018 1.6  1.2 - 2.2 Final    Bilirubin, total 06/27/2018 0.3  0.0 - 1.2 mg/dL Final    Alk. phosphatase 06/27/2018 79  39 - 117 IU/L Final    AST (SGOT) 06/27/2018 16  0 - 40 IU/L Final    ALT (SGPT) 06/27/2018 39  0 - 44 IU/L Final    TSH 06/27/2018 4.340  0.450 - 4.500 uIU/mL Final    VITAMIN D, 25-HYDROXY 06/27/2018 29.5* 30.0 - 100.0 ng/mL Final    Comment: Vitamin D deficiency has been defined by the [facility] practice guideline as a  level of serum 25-OH vitamin D less than 20 ng/mL (1,2). The Endocrine Society went on to further define vitamin D  insufficiency as a level between 21 and 29 ng/mL (2). 1. IOM ([place] [employer]). 2010. Dietary reference     intakes for calcium and D. [facility]: The     Aniika. 2. Spenser MF, Bella NC, Charley CONNORS, et al.     Evaluation, treatment, and prevention of vitamin D     deficiency: an Endocrine Society clinical practice     guideline. JCEM. 2011 Jul; 96(7):1911-30.  Hemoglobin A1c 06/27/2018 5.1  4.8 - 5.6 % Final    Comment:          Pre-diabetes: 5.7 - 6.4           Diabetes: >6.4           Glycemic control for adults with diabetes: <7.0      Estimated average glucose 06/27/2018 100  mg/dL Final    WBC 06/27/2018 7.2  3.4 - 10.8 x10E3/uL Final    RBC 06/27/2018 5.20  4. 14 - 5.80 x10E6/uL Final    HGB 06/27/2018 15.7  13.0 - 17.7 g/dL Final    HCT 06/27/2018 45.1  37.5 - 51.0 % Final    MCV 06/27/2018 87  79 - 97 fL Final    MCH 06/27/2018 30.2  26.6 - 33.0 pg Final    MCHC 06/27/2018 34.8  31.5 - 35.7 g/dL Final    RDW 06/27/2018 13.7  12.3 - 15.4 % Final    PLATELET 25/67/4199 448  150 - 379 x10E3/uL Final    NEUTROPHILS 06/27/2018 54  Not Estab. % Final    Lymphocytes 06/27/2018 34  Not Estab. % Final    MONOCYTES 06/27/2018 8  Not Estab. % Final    EOSINOPHILS 06/27/2018 3  Not Estab. % Final    BASOPHILS 06/27/2018 1  Not Estab. % Final    ABS. NEUTROPHILS 06/27/2018 3.9  1.4 - 7.0 x10E3/uL Final    Abs Lymphocytes 06/27/2018 2.4  0.7 - 3.1 x10E3/uL Final    ABS. MONOCYTES 06/27/2018 0.5  0.1 - 0.9 x10E3/uL Final    ABS. EOSINOPHILS 06/27/2018 0.2  0.0 - 0.4 x10E3/uL Final    ABS. BASOPHILS 06/27/2018 0.1  0.0 - 0.2 x10E3/uL Final    IMMATURE GRANULOCYTES 06/27/2018 0  Not Estab. % Final    ABS. IMM. GRANS. 06/27/2018 0.0  0.0 - 0.1 x10E3/uL Final    Vitamin B12 06/27/2018 816  232 - 1245 pg/mL Final    INTERPRETATION 06/27/2018 Note   Final    Supplemental report is available. Review of Systems  A comprehensive review of systems was negative except for that written in the HPI.     Patient Active Problem List   Diagnosis Code    Chronic tension-type headache, intractable G44.221    Migraine without aura and without status migrainosus, not intractable G43.009    Adjustment reaction with anxiety and depression F43.23    Vitamin D deficiency E55.9    Hypercholesterolemia E78.00    Hypertension I10    GERD (gastroesophageal reflux disease) K21.9    ADD (attention deficit disorder) without hyperactivity F98.8    Moderate major depression (HCC) F32.1    Amphetamine abuse, episodic F15.10    Vitamin B12 deficiency E53.8    Hypothyroidism E03.9    Fatigue R53.83       Past Medical History:   Diagnosis Date    ADD (attention deficit disorder) without hyperactivity 6/7/2018    Adjustment reaction with anxiety and depression     Attention deficit hyperactivity disorder (ADHD), predominantly inattentive type 6/7/2018    Cancer ([facility] [address]     testicular     Cancer ([facility] [address] 2010    Testicular    Community acquired pneumonia     Depression     GERD (gastroesophageal reflux disease)     Headache     Hypercholesterolemia     Hypertension     Vitamin D deficiency        Past Surgical History:   Procedure Laterality Date    HX ORCHIECTOMY Right 2010    HX UROLOGICAL      removed on e testicle    HX WISDOM TEETH EXTRACTION  1996       Social History   Substance Use Topics    Smoking status: Former Smoker     Packs/day: 1.00     Years: 8.00     Types: Cigarettes     Quit date: 2008    Smokeless tobacco: Never Used    Alcohol use 6.0 oz/week     24 Cans of beer per week       Family History   Problem Relation Age of Onset    Cancer Mother      Ovarian    Hypertension Mother     Cancer Father      Throat    Hypertension Father     Arrhythmia Brother      Afib    No Known Problems Maternal Grandmother     No Known Problems Maternal Grandfather     No Known Problems Paternal Grandmother     No Known Problems Paternal Grandfather        Outpatient Prescriptions Marked as Taking for the 6/29/18 encounter (Office Visit) with Benson John NP   Medication Sig Dispense Refill    cyanocobalamin (VITAMIN B-12) 1,000 mcg/mL injection 1 mL by IntraMUSCular route once for 1 dose. 1 mL 0    lamoTRIgine (LAMICTAL) 25 mg tablet For the first two weeks, take 25mg daily, then weeks 3 & 4, take 50mg daily 42 Tab 0    ALPRAZolam (XANAX) 1 mg tablet Take 0.5 Tabs by mouth three (3) times daily as needed for Anxiety. Max Daily Amount: 1.5 mg. 10 Tab 0    lisinopril (PRINIVIL, ZESTRIL) 10 mg tablet Take 1 Tab by mouth daily. 90 Tab 3    multivitamin (ONE A DAY) tablet Take 1 Tab by mouth daily.  ibuprofen (MOTRIN) 200 mg tablet Take 400 mg by mouth every eight (8) hours as needed for Pain.  buPROPion SR (WELLBUTRIN SR) 150 mg SR tablet Take 1 Tab by mouth two (2) times a day. 60 Tab 0    [DISCONTINUED] ergocalciferol (ERGOCALCIFEROL) 50,000 unit capsule Take 1 Cap by mouth every seven (7) days.  5 Cap 2       No Known Allergies    PE:  Visit Vitals    /89 (BP 1 Location: Left arm, BP Patient Position: Sitting)    Pulse 78    Temp 98.7 °F (37.1 °C)    Resp 18    Ht 6' 4.5\" (1.943 m)    Wt 252 lb 14.4 oz (114.7 kg)    SpO2 97%    BMI 30.38 kg/m2     Gen: alert, oriented, no acute distress  Head: normocephalic, atraumatic  Ears: external auditory canals clear, TMs without erythema or effusion  Eyes: pupils equal round reactive to light, sclera clear, conjunctiva clear  Oral: moist mucus membranes, no oral lesions, no pharyngeal inflammation or exudate  Neck: symmetric normal sized thyroid, no carotid bruits, no jugular vein distention  Resp: no increase work of breathing, lungs clear to ausculation bilaterally, no wheezing, rales or rhonchi  CV: S1, S2 normal.  No murmurs, rubs, or gallops. Abd: soft, not tender, not distended. No hepatosplenomegaly. Normal bowel sounds. No hernias. No abdominal or renal bruits. Neuro: cranial nerves intact, normal strength and movement in all extremities, reflexes and sensation intact and symmetric. Skin: no lesion or rash  Extremities: no cyanosis or edema    Assessment/Plan:    ICD-10-CM ICD-9-CM    1. Adjustment reaction with anxiety and depression F43.23 309.28 lamoTRIgine (LAMICTAL) 25 mg tablet   2. Vitamin D deficiency E55.9 268.9    3. Hypercholesterolemia E78.00 272.0    4. Essential hypertension I10 401.9    5. Vitamin B12 deficiency E53.8 266.2 VITAMIN B12 INJECTION      THER/PROPH/DIAG INJECTION, SUBCUT/IM      cyanocobalamin (VITAMIN B-12) 1,000 mcg/mL injection   6. Hypothyroidism, unspecified type E03.9 244.9      Follow-up Disposition:  Return in about 4 weeks (around 7/27/2018) for Medication Check, XOL, Hypothyroidism, Depression, Vitamin D deficiency. lab results and schedule of future lab studies reviewed with patient  reviewed diet, exercise and weight control  cardiovascular risk and specific lipid/LDL goals reviewed  reviewed medications and side effects in detail    Health Maintenance reviewed - UTD.     Recommended healthy diet low in carbohydrates, fats, sodium and cholesterol. Recommended regular cardiovascular exercise 3-6 times per week for 30-60 minutes daily. Chart is reviewed and updated today in the office. Records requested for other providers patient has seen and is currently seeing. Patient was offered a choice/choices in the treatment plan today. Patient expresses understanding of the plan and agrees with recommendations. Verbal and written instructions (see AVS) provided. See patient instructions for more. Patient expresses understanding of diagnosis and treatment plan.

## 2018-06-29 NOTE — PROGRESS NOTES
Your lab results have been reviewed and are as follows:    Cholesterol Panel: Total Cholesterol is 223 (goal <200). Triglycerides are 152. (goal <150)  Your HDL or \"good\" cholesterol is 49. (goal >40). Your LDL or \"bad\" cholesterol is 144. (goal <100)  Your cholesterol is elevated. You will need to start on an anti-lipid medication called Lipitor taken once daily to help reduce these levels. We will need to recheck this in one month. CMP:  Fasting blood sugar is normal at 87, your hemoglobin A1C is 5.1. You do not have diabetes. Kidney function is normal.   Your electrolytes are normal.   Your liver function is normal.     Thyroid:  Your thyroid function is slightly abnormal.  I would recommend we start you on Levothyroxine 25mcg daily. You need to take this medication first thing in the morning on an empty stomach and then not eat or drink or take other medications for the next hour. We will need to recheck your TSH level in one month. Vitamin D:  Your vitamin D is low at 29.5. This is an important nutrient bone health and to fight inflammation and infection. We will start a prescription to increase your Vitamin D level for the next 3 months. You will take 50,000 international units Vitamin D2 in the form of one tab taken once weekly, I have sent this prescription to your pharmacy on file. After you complete the prescription, start taking OTC Vitamin D3 2,000 international units daily. After three months we will recheck your levels. A normal value is between 40-50 on average. The symptoms of vitamin D deficiency are sometimes vague and can include tiredness and general aches and pains. Some people may not have any symptoms at all. Vitamin D also fights infections, including colds and the flu, as it regulates the expression of genes that influence your immune system to attack and destroy bacteria and viruses.       CBC:  Your red blood cell count is normal.   Your white blood cell count is normal.   Your platelet count is normal.   You are not anemic and your hemoglobin is 15.7. Your vitamin B12 is normal.    Your urine tests are still pending. Divya Gutierrez MSN, A, FNP-BC

## 2018-06-29 NOTE — PATIENT INSTRUCTIONS
Your lab results have been reviewed and are as follows:    Cholesterol Panel: Total Cholesterol is 223 (goal <200). Triglycerides are 152. (goal <150)  Your HDL or \"good\" cholesterol is 49. (goal >40). Your LDL or \"bad\" cholesterol is 144. (goal <100)  Your cholesterol is elevated. You will need to start on an anti-lipid medication called Lipitor taken once daily to help reduce these levels. We will need to recheck this in one month. CMP:  Fasting blood sugar is normal at 87, your hemoglobin A1C is 5.1. You do not have diabetes. Kidney function is normal.   Your electrolytes are normal.   Your liver function is normal.     Thyroid:  Your thyroid function is slightly abnormal.  I would recommend we start you on Levothyroxine 25mcg daily. You need to take this medication first thing in the morning on an empty stomach and then not eat or drink or take other medications for the next hour. We will need to recheck your TSH level in one month. Vitamin D:  Your vitamin D is low at 29.5. This is an important nutrient bone health and to fight inflammation and infection. We will start a prescription to increase your Vitamin D level for the next 3 months. You will take 50,000 international units Vitamin D2 in the form of one tab taken once weekly, I have sent this prescription to your pharmacy on file. After you complete the prescription, start taking OTC Vitamin D3 2,000 international units daily. After three months we will recheck your levels. A normal value is between 40-50 on average. The symptoms of vitamin D deficiency are sometimes vague and can include tiredness and general aches and pains. Some people may not have any symptoms at all. Vitamin D also fights infections, including colds and the flu, as it regulates the expression of genes that influence your immune system to attack and destroy bacteria and viruses.       CBC:  Your red blood cell count is normal.   Your white blood cell count is normal.   Your platelet count is normal.   You are not anemic and your hemoglobin is 15.7. Your vitamin B12 is normal.    Your urine tests are still pending. Divya Gutierrez, MSN, A, FN-BC               Hypothyroidism: Care Instructions  Your Care Instructions    You have hypothyroidism, which means that your body is not making enough thyroid hormone. This hormone helps your body use energy. If your thyroid level is low, you may feel tired, be constipated, have an increase in your blood pressure, or have dry skin or memory problems. You may also get cold easily, even when it is warm. Women with low thyroid levels may have heavy menstrual periods. A blood test to find your thyroid-stimulating hormone (TSH) level is used to check for hypothyroidism. A high TSH level may mean that you have low thyroid. When your body is not making enough thyroid hormone, TSH levels rise in an effort to make the body produce more. The treatment for hypothyroidism is to take thyroid hormone pills. You should start to feel better in 1 to 2 weeks. But it can take several months to see changes in the TSH level. You will need regular visits with your doctor to make sure you have the right dose of medicine. Most people need treatment for the rest of their lives. You will need to see your doctor regularly to have blood tests and to make sure you are doing well. Follow-up care is a key part of your treatment and safety. Be sure to make and go to all appointments, and call your doctor if you are having problems. It's also a good idea to know your test results and keep a list of the medicines you take. How can you care for yourself at home? · Take your thyroid hormone medicine exactly as prescribed. Call your doctor if you think you are having a problem with your medicine. Most people do not have side effects if they take the right amount of medicine regularly.   ¨ Take the medicine 30 minutes before breakfast, and do not take it with calcium, vitamins, or iron. ¨ Do not take extra doses of your thyroid medicine. It will not help you get better any faster, and it may cause side effects. ¨ If you forget to take a dose, do NOT take a double dose of medicine. Take your usual dose the next day. · Tell your doctor about all prescription, herbal, or over-the-counter products you take. · Take care of yourself. Eat a healthy diet, get enough sleep, and get regular exercise. When should you call for help? Call 911 anytime you think you may need emergency care. For example, call if:  ? · You passed out (lost consciousness). ? · You have severe trouble breathing. ? · You have a very slow heartbeat (less than 60 beats a minute). ? · You have a low body temperature (95°F or below). ?Call your doctor now or seek immediate medical care if:  ? · You feel tired, sluggish, or weak. ? · You have trouble remembering things or concentrating. ? · You do not begin to feel better 2 weeks after starting your medicine. ? Watch closely for changes in your health, and be sure to contact your doctor if you have any problems. Where can you learn more? Go to http://charissa-maria elena.info/. Enter Q484 in the search box to learn more about \"Hypothyroidism: Care Instructions. \"  Current as of: May 12, 2017  Content Version: 11.4  © 4483-2599 [employer]. Care instructions adapted under license by [employer] (which disclaims liability or warranty for this information). If you have questions about a medical condition or this instruction, always ask your healthcare professional. [place] [address] any warranty or liability for your use of this information. High Cholesterol: Care Instructions  Your Care Instructions    Cholesterol is a type of fat in your blood. It is needed for many body functions, such as making new cells. Cholesterol is made by your body.  It also comes from food you eat. High cholesterol means that you have too much of the fat in your blood. This raises your risk of a heart attack and stroke. LDL and HDL are part of your total cholesterol. LDL is the \"bad\" cholesterol. High LDL can raise your risk for heart disease, heart attack, and stroke. HDL is the \"good\" cholesterol. It helps clear bad cholesterol from the body. High HDL is linked with a lower risk of heart disease, heart attack, and stroke. Your cholesterol levels help your doctor find out your risk for having a heart attack or stroke. You and your doctor can talk about whether you need to lower your risk and what treatment is best for you. A heart-healthy lifestyle along with medicines can help lower your cholesterol and your risk. The way you choose to lower your risk will depend on how high your risk is for heart attack and stroke. It will also depend on how you feel about taking medicines. Follow-up care is a key part of your treatment and safety. Be sure to make and go to all appointments, and call your doctor if you are having problems. It's also a good idea to know your test results and keep a list of the medicines you take. How can you care for yourself at home? · Eat a variety of foods every day. Good choices include fruits, vegetables, whole grains (like oatmeal), dried beans and peas, nuts and seeds, soy products (like tofu), and fat-free or low-fat dairy products. · Replace butter, margarine, and hydrogenated or partially hydrogenated oils with olive and canola oils. (Canola oil margarine without trans fat is fine.)  · Replace red meat with fish, poultry, and soy protein (like tofu). · Limit processed and packaged foods like chips, crackers, and cookies. · Bake, broil, or steam foods. Don't sebastian them. · Be physically active. Get at least 30 minutes of exercise on most days of the week. Walking is a good choice.  You also may want to do other activities, such as running, swimming, cycling, or playing tennis or team sports. · Stay at a healthy weight or lose weight by making the changes in eating and physical activity listed above. Losing just a small amount of weight, even 5 to 10 pounds, can reduce your risk for having a heart attack or stroke. · Do not smoke. When should you call for help? Watch closely for changes in your health, and be sure to contact your doctor if:  ? · You need help making lifestyle changes. ? · You have questions about your medicine. Where can you learn more? Go to http://charissa-maria elena.info/. Enter G648 in the search box to learn more about \"High Cholesterol: Care Instructions. \"  Current as of: September 21, 2016  Content Version: 11.4  © 9546-8296 [employer], [employer]. Care instructions adapted under license by [employer] (which disclaims liability or warranty for this information). If you have questions about a medical condition or this instruction, always ask your healthcare professional. [place] [address] any warranty or liability for your use of this information.

## 2018-06-29 NOTE — MR AVS SNAPSHOT
[address] [place] [address] [address] 
[address] [place] [address] 
[phone] Patient: Buzz Sol MRN: [id number] :1975 Visit Information Date & Time Provider Department Dept. Phone Encounter #  
 2018 11:00 AM Johnny Liriano NP [facility] [phone] Follow-up Instructions Return in about 4 weeks (around 2018) for Medication Check, XOL, Hypothyroidism, Depression, Vitamin D deficiency. Upcoming Health Maintenance Date Due Pneumococcal 19-64 Highest Risk (1 of 3 - PCV13) 2018* DTaP/Tdap/Td series (1 - Tdap) 2019* Influenza Age 5 to Adult 2018 *Topic was postponed. The date shown is not the original due date. Allergies as of 2018  Review Complete On: 2018 By: Johnny Liriano NP No Known Allergies Current Immunizations  Reviewed on 2018 No immunizations on file. Reviewed by Lisa Johnston LPN on  at 77:38 AM  
You Were Diagnosed With   
  
 Codes Comments Adjustment reaction with anxiety and depression    -  Primary ICD-10-CM: F43.23 
ICD-9-CM: 309.28 Vitamin D deficiency     ICD-10-CM: E55.9 ICD-9-CM: 268.9 Hypercholesterolemia     ICD-10-CM: E78.00 ICD-9-CM: 272.0 Essential hypertension     ICD-10-CM: I10 
ICD-9-CM: 401.9 Vitamin B12 deficiency     ICD-10-CM: E53.8 ICD-9-CM: 266.2 Hypothyroidism, unspecified type     ICD-10-CM: E03.9 ICD-9-CM: 797. 9 Vitals BP Pulse Temp Resp Height(growth percentile) Weight(growth percentile) 128/89 (BP 1 Location: Left arm, BP Patient Position: Sitting) 78 98.7 °F (37.1 °C) 18 6' 4.5\" (1.943 m) 252 lb 14.4 oz (114.7 kg) SpO2 BMI Smoking Status 97% 30.38 kg/m2 Former Smoker BMI and BSA Data Body Mass Index Body Surface Area  
 30.38 kg/m 2 2.49 m 2 Preferred Pharmacy Pharmacy Name Phone [facility] [place], [place] - [address] [place] [address] Rd AT [address] [phone] Your Updated Medication List  
  
   
This list is accurate as of 6/29/18  1:08 PM.  Always use your most recent med list.  
  
  
  
  
 ALPRAZolam 1 mg tablet Commonly known as:  Rosalene Fabián Take 0.5 Tabs by mouth three (3) times daily as needed for Anxiety. Max Daily Amount: 1.5 mg.  
  
 atorvastatin 20 mg tablet Commonly known as:  LIPITOR Take 1 Tab by mouth daily. buPROPion  mg SR tablet Commonly known as:  Aliene Harris Take 1 Tab by mouth two (2) times a day. cyanocobalamin 1,000 mcg/mL injection Commonly known as:  VITAMIN B-12  
1 mL by IntraMUSCular route once for 1 dose. ergocalciferol 50,000 unit capsule Commonly known as:  ERGOCALCIFEROL Take 1 Cap by mouth every seven (7) days. ibuprofen 200 mg tablet Commonly known as:  MOTRIN Take 400 mg by mouth every eight (8) hours as needed for Pain.  
  
 lamoTRIgine 25 mg tablet Commonly known as: LaMICtal  
For the first two weeks, take 25mg daily, then weeks 3 & 4, take 50mg daily  
  
 levothyroxine 25 mcg tablet Commonly known as:  SYNTHROID Take 1 Tab by mouth Daily (before breakfast). lisinopril 10 mg tablet Commonly known as:  Jinny Drought Take 1 Tab by mouth daily. multivitamin tablet Commonly known as:  ONE A DAY Take 1 Tab by mouth daily. Prescriptions Sent to Pharmacy Refills  
 lamoTRIgine (LAMICTAL) 25 mg tablet 0 Sig: For the first two weeks, take 25mg daily, then weeks 3 & 4, take 50mg daily Class: Normal  
 Pharmacy: [facility] [employer] [facility] AT [address] Ph #: [phone] We Performed the Following THER/PROPH/DIAG INJECTION, SUBCUT/IM [id number] CPT(R)] VITAMIN B12 INJECTION [ [facility]] Follow-up Instructions Return in about 4 weeks (around 7/27/2018) for Medication Check, XOL, Hypothyroidism, Depression, Vitamin D deficiency. Patient Instructions Your lab results have been reviewed and are as follows: 
 
Cholesterol Panel: Total Cholesterol is 223 (goal <200). Triglycerides are 152. (goal <150) Your HDL or \"good\" cholesterol is 49. (goal >40). Your LDL or \"bad\" cholesterol is 144. (goal <100) Your cholesterol is elevated. You will need to start on an anti-lipid medication called Lipitor taken once daily to help reduce these levels. We will need to recheck this in one month. CMP: 
Fasting blood sugar is normal at 87, your hemoglobin A1C is 5.1. You do not have diabetes. Kidney function is normal.  
Your electrolytes are normal.  
Your liver function is normal.  
 
Thyroid: 
Your thyroid function is slightly abnormal.  I would recommend we start you on Levothyroxine 25mcg daily. You need to take this medication first thing in the morning on an empty stomach and then not eat or drink or take other medications for the next hour. We will need to recheck your TSH level in one month. Vitamin D: 
Your vitamin D is low at 29.5. This is an important nutrient bone health and to fight inflammation and infection. We will start a prescription to increase your Vitamin D level for the next 3 months. You will take 50,000 international units Vitamin D2 in the form of one tab taken once weekly, I have sent this prescription to your pharmacy on file. After you complete the prescription, start taking OTC Vitamin D3 2,000 international units daily. After three months we will recheck your levels. A normal value is between 40-50 on average. The symptoms of vitamin D deficiency are sometimes vague and can include tiredness and general aches and pains. Some people may not have any symptoms at all.  
Vitamin D also fights infections, including colds and the flu, as it regulates the expression of genes that influence your immune system to attack and destroy bacteria and viruses. CBC: 
Your red blood cell count is normal.  
Your white blood cell count is normal.  
Your platelet count is normal.  
You are not anemic and your hemoglobin is 15.7. Your vitamin B12 is normal. 
 
Your urine tests are still pending. Divya Gutierrez, MSN, A, FNP-BC Hypothyroidism: Care Instructions Your Care Instructions You have hypothyroidism, which means that your body is not making enough thyroid hormone. This hormone helps your body use energy. If your thyroid level is low, you may feel tired, be constipated, have an increase in your blood pressure, or have dry skin or memory problems. You may also get cold easily, even when it is warm. Women with low thyroid levels may have heavy menstrual periods. A blood test to find your thyroid-stimulating hormone (TSH) level is used to check for hypothyroidism. A high TSH level may mean that you have low thyroid. When your body is not making enough thyroid hormone, TSH levels rise in an effort to make the body produce more. The treatment for hypothyroidism is to take thyroid hormone pills. You should start to feel better in 1 to 2 weeks. But it can take several months to see changes in the TSH level. You will need regular visits with your doctor to make sure you have the right dose of medicine. Most people need treatment for the rest of their lives. You will need to see your doctor regularly to have blood tests and to make sure you are doing well. Follow-up care is a key part of your treatment and safety. Be sure to make and go to all appointments, and call your doctor if you are having problems. It's also a good idea to know your test results and keep a list of the medicines you take. How can you care for yourself at home? · Take your thyroid hormone medicine exactly as prescribed.  Call your doctor if you think you are having a problem with your medicine. Most people do not have side effects if they take the right amount of medicine regularly. ¨ Take the medicine 30 minutes before breakfast, and do not take it with calcium, vitamins, or iron. ¨ Do not take extra doses of your thyroid medicine. It will not help you get better any faster, and it may cause side effects. ¨ If you forget to take a dose, do NOT take a double dose of medicine. Take your usual dose the next day. · Tell your doctor about all prescription, herbal, or over-the-counter products you take. · Take care of yourself. Eat a healthy diet, get enough sleep, and get regular exercise. When should you call for help? Call 911 anytime you think you may need emergency care. For example, call if: 
? · You passed out (lost consciousness). ? · You have severe trouble breathing. ? · You have a very slow heartbeat (less than 60 beats a minute). ? · You have a low body temperature (95°F or below). ?Call your doctor now or seek immediate medical care if: 
? · You feel tired, sluggish, or weak. ? · You have trouble remembering things or concentrating. ? · You do not begin to feel better 2 weeks after starting your medicine. ? Watch closely for changes in your health, and be sure to contact your doctor if you have any problems. Where can you learn more? Go to http://charissa-maria elena.info/. Enter K368 in the search box to learn more about \"Hypothyroidism: Care Instructions. \" Current as of: May 12, 2017 Content Version: 11.4 © 7388-3114 [facility]. Care instructions adapted under license by [employer] (which disclaims liability or warranty for this information). If you have questions about a medical condition or this instruction, always ask your healthcare professional. [address] any warranty or liability for your use of this information. High Cholesterol: Care Instructions Your Care Instructions Cholesterol is a type of fat in your blood. It is needed for many body functions, such as making new cells. Cholesterol is made by your body. It also comes from food you eat. High cholesterol means that you have too much of the fat in your blood. This raises your risk of a heart attack and stroke. LDL and HDL are part of your total cholesterol. LDL is the \"bad\" cholesterol. High LDL can raise your risk for heart disease, heart attack, and stroke. HDL is the \"good\" cholesterol. It helps clear bad cholesterol from the body. High HDL is linked with a lower risk of heart disease, heart attack, and stroke. Your cholesterol levels help your doctor find out your risk for having a heart attack or stroke. You and your doctor can talk about whether you need to lower your risk and what treatment is best for you. A heart-healthy lifestyle along with medicines can help lower your cholesterol and your risk. The way you choose to lower your risk will depend on how high your risk is for heart attack and stroke. It will also depend on how you feel about taking medicines. Follow-up care is a key part of your treatment and safety. Be sure to make and go to all appointments, and call your doctor if you are having problems. It's also a good idea to know your test results and keep a list of the medicines you take. How can you care for yourself at home? · Eat a variety of foods every day. Good choices include fruits, vegetables, whole grains (like oatmeal), dried beans and peas, nuts and seeds, soy products (like tofu), and fat-free or low-fat dairy products. · Replace butter, margarine, and hydrogenated or partially hydrogenated oils with olive and canola oils. (Canola oil margarine without trans fat is fine.) · Replace red meat with fish, poultry, and soy protein (like tofu). · Limit processed and packaged foods like chips, crackers, and cookies. · Bake, broil, or steam foods. Don't sebastian them. · Be physically active. Get at least 30 minutes of exercise on most days of the week. Walking is a good choice. You also may want to do other activities, such as running, swimming, cycling, or playing tennis or team sports. · Stay at a healthy weight or lose weight by making the changes in eating and physical activity listed above. Losing just a small amount of weight, even 5 to 10 pounds, can reduce your risk for having a heart attack or stroke. · Do not smoke. When should you call for help? Watch closely for changes in your health, and be sure to contact your doctor if: 
? · You need help making lifestyle changes. ? · You have questions about your medicine. Where can you learn more? Go to http://charissa-maria elena.info/. Enter T028 in the search box to learn more about \"High Cholesterol: Care Instructions. \" Current as of: September 21, 2016 Content Version: 11.4 © 2848-4693 [employer]. Care instructions adapted under license by [employer] (which disclaims liability or warranty for this information). If you have questions about a medical condition or this instruction, always ask your healthcare professional. [place] [address] any warranty or liability for your use of this information. Introducing [facility]! [facility] introduces [employer] patient portal. Now you can access parts of your medical record, email your doctor's office, and request medication refills online. 1. In your internet browser, go to [email or web address] 2. Click on the First Time User? Click Here link in the Sign In box. You will see the New Member Sign Up page. 3. Enter your [employer] Access Code exactly as it appears below. You will not need to use this code after youve completed the sign-up process. If you do not sign up before the expiration date, you must request a new code. · [employer] Access Code: [email or web address] Expires: 9/10/2018  4:04 PM 
 
4. Enter the last four digits of your Social Security Number (xxxx) and Date of Birth (mm/dd/yyyy) as indicated and click Submit. You will be taken to the next sign-up page. 5. Create a Z[employer]TV ID. This will be your [employer]TV login ID and cannot be changed, so think of one that is secure and easy to remember. 6. Create a [employer] password. You can change your password at any time. 7. Enter your Password Reset Question and Answer. This can be used at a later time if you forget your password. 8. Enter your e-mail address. You will receive e-mail notification when new information is available in [address] 9. Click Sign Up. You can now view and download portions of your medical record. 10. Click the Download Summary menu link to download a portable copy of your medical information. If you have questions, please visit the Frequently Asked Questions section of the [employer] website. Remember, [employer]TV is NOT to be used for urgent needs. For medical emergencies, dial 911. Now available from your iPhone and Android! Please provide this summary of care documentation to your next provider. Your primary care clinician is listed as Melisa Brooks. If you have any questions after today's visit, please call [phone].

## 2018-06-29 NOTE — PROGRESS NOTES
Shelly Irving  Identified pt with two pt identifiers(name and ). Chief Complaint   Patient presents with    Other     Discuss paperwork to return to work    Dizziness     pt states he feels light headed       1. Have you been to the ER, urgent care clinic since your last visit? Hospitalized since your last visit? no    2. Have you seen or consulted any other health care providers outside of the [facility] since your last visit? Include any pap smears or colon screening. no    Today's provider has been notified of reason for visit, vitals and flowsheets obtained on patients. Patient received paperwork for advance directive during previous visit but has not completed at this time     Reviewed record In preparation for visit, huddled with provider and have obtained necessary documentation      There are no preventive care reminders to display for this patient.     Wt Readings from Last 3 Encounters:   18 252 lb 14.4 oz (114.7 kg)   18 251 lb 3.2 oz (113.9 kg)   18 248 lb 1.6 oz (112.5 kg)     Temp Readings from Last 3 Encounters:   18 98.7 °F (37.1 °C)   18 98.5 °F (36.9 °C) (Oral)   18 97 °F (36.1 °C) (Oral)     BP Readings from Last 3 Encounters:   18 128/89   18 116/83   18 133/83     Pulse Readings from Last 3 Encounters:   18 78   18 82   18 80     Vitals:    18 1143   BP: 128/89   Pulse: 78   Resp: 18   Temp: 98.7 °F (37.1 °C)   SpO2: 97%   Weight: 252 lb 14.4 oz (114.7 kg)   Height: 6' 4.5\" (1.943 m)   PainSc:   0 - No pain         Learning Assessment:  :     Learning Assessment 2015   PRIMARY LEARNER Patient   PRIMARY LANGUAGE ENGLISH   LEARNER PREFERENCE PRIMARY OTHER (COMMENT)   ANSWERED BY pt   RELATIONSHIP SELF       Depression Screening:  :     PHQ over the last two weeks 2018   Little interest or pleasure in doing things Nearly every day   Feeling down, depressed or hopeless Nearly every day   Total Score PHQ 2 6   Trouble falling or staying asleep, or sleeping too much Nearly every day   Feeling tired or having little energy Nearly every day   Poor appetite or overeating Nearly every day   Feeling bad about yourself - or that you are a failure or have let yourself or your family down Nearly every day   Trouble concentrating on things such as school, work, reading or watching TV Nearly every day   Moving or speaking so slowly that other people could have noticed; or the opposite being so fidgety that others notice Not at all   Thoughts of being better off dead, or hurting yourself in some way Several days   PHQ 9 Score 22   How difficult have these problems made it for you to do your work, take care of your home and get along with others Very difficult       Fall Risk Assessment:  :     No flowsheet data found. Abuse Screening:  :     No flowsheet data found. ADL Screening:  :     ADL Assessment 6/5/2018   Feeding yourself No Help Needed   Getting from bed to chair No Help Needed   Getting dressed No Help Needed   Bathing or showering No Help Needed   Walk across the room (includes cane/walker) No Help Needed   Using the telphone No Help Needed   Taking your medications No Help Needed   Preparing meals No Help Needed   Managing money (expenses/bills) No Help Needed   Moderately strenuous housework (laundry) No Help Needed   Shopping for personal items (toiletries/medicines) No Help Needed   Shopping for groceries No Help Needed   Driving No Help Needed   Climbing a flight of stairs No Help Needed   Getting to places beyond walking distances No Help Needed                 Medication reconciliation up to date and corrected with patient at this time.

## 2018-07-02 ENCOUNTER — OFFICE VISIT (OUTPATIENT)
Dept: FAMILY MEDICINE CLINIC | Age: 43
End: 2018-07-02

## 2018-07-02 VITALS
TEMPERATURE: 98.5 F | RESPIRATION RATE: 18 BRPM | SYSTOLIC BLOOD PRESSURE: 145 MMHG | HEIGHT: 77 IN | HEART RATE: 80 BPM | OXYGEN SATURATION: 95 % | WEIGHT: 252.5 LBS | DIASTOLIC BLOOD PRESSURE: 84 MMHG | BODY MASS INDEX: 29.81 KG/M2

## 2018-07-02 DIAGNOSIS — E55.9 VITAMIN D DEFICIENCY: ICD-10-CM

## 2018-07-02 DIAGNOSIS — E03.9 HYPOTHYROIDISM, UNSPECIFIED TYPE: ICD-10-CM

## 2018-07-02 DIAGNOSIS — F43.23 ADJUSTMENT REACTION WITH ANXIETY AND DEPRESSION: ICD-10-CM

## 2018-07-02 DIAGNOSIS — E78.00 HYPERCHOLESTEROLEMIA: Primary | ICD-10-CM

## 2018-07-02 DIAGNOSIS — F98.8 ADD (ATTENTION DEFICIT DISORDER) WITHOUT HYPERACTIVITY: ICD-10-CM

## 2018-07-02 DIAGNOSIS — I10 ESSENTIAL HYPERTENSION: ICD-10-CM

## 2018-07-02 NOTE — PATIENT INSTRUCTIONS
Adjustment Disorder: Care Instructions  Your Care Instructions    Adjustment disorder means that you have emotional or behavioral problems because of stress. But your response to the stress is far more severe than a normal response. It is severe enough to affect your work or social life and may cause depression and physical pains and problems. Events that may cause this response can include a divorce, money problems, or starting school or a new job. It might be anything that causes some stress. This disorder is most often a short-term problem. It happens within 3 months of the stressful event or change. If the response lasts longer than 6 months after the event ends, you may have a more serious disorder. Follow-up care is a key part of your treatment and safety. Be sure to make and go to all appointments, and call your doctor if you are having problems. It's also a good idea to know your test results and keep a list of the medicines you take. How can you care for yourself at home? · Go to all counseling sessions. Do not skip any because you are feeling better. · If your doctor prescribed medicines, take them exactly as prescribed. Call your doctor if you think you are having a problem with your medicine. You will get more details on the specific medicines your doctor prescribes. · Discuss the causes of your stress with a good friend or family member. Or you can join a support group for people with similar problems. Talking to others sometimes relieves stress. · Get at least 30 minutes of exercise on most days of the week. Walking is a good choice. You also may want to do other activities, such as running, swimming, cycling, or playing tennis or team sports. Relaxation techniques  Do relaxation exercises 10 to 20 minutes a day. You can play soothing, relaxing music while you do them, if you wish. · Tell others in your house that you are going to do your relaxation exercises.  Ask them not to disturb you.  · Find a comfortable, quiet place. · Lie down on your back, or sit with your back straight. · Focus on your breathing. Make it slow and steady. · Breathe in through your nose. Breathe out through either your nose or mouth. · Breathe deeply, filling up the area between your navel and your rib cage. Breathe so that your belly goes up and down. · Do not hold your breath. · Breathe like this for 5 to 10 minutes. Notice the feeling of calmness throughout your whole body. As you continue to breathe slowly and deeply, relax by doing these next steps for another 5 to 10 minutes:  · Tighten and relax each muscle group in your body. Start at your toes, and work your way up to your head. · Imagine your muscle groups relaxing and getting heavy. · Empty your mind of all thoughts. · Let yourself relax more and more deeply. · Be aware of the state of calmness that surrounds you. · When your relaxation time is over, you can bring yourself back to alertness by moving your fingers and toes. Then move your hands and feet. And then move your entire body. Sometimes people fall asleep during relaxation. But they most often wake up soon. · Always give yourself time to return to full alertness before you drive a car. Wait to do anything that might cause an accident if you are not fully alert. Never play a relaxation tape while you drive a car. When should you call for help? Call 911 anytime you think you may need emergency care. For example, call if:  ? · You feel you cannot stop from hurting yourself or someone else. Keep the numbers for these national suicide hotlines: 7-344-476-TALK (7-784.419.3657) and 5-869-YOUCCMQ (8-692.965.1373). If you or someone you know talks about suicide or feeling hopeless, get help right away. ? Watch closely for changes in your health, and be sure to contact your doctor if:  ? · You have new anxiety, or your anxiety gets worse.    ? · You have been feeling sad, depressed, or hopeless or have lost interest in things that you usually enjoy. ? · You do not get better as expected. Where can you learn more? Go to http://charissa-maria elena.info/. Enter 0688 [id number] [id number] in the search box to learn more about \"Adjustment Disorder: Care Instructions. \"  Current as of: July 26, 2016  Content Version: 11.4  © 3078-0565 [employer]. Care instructions adapted under license by [employer] (which disclaims liability or warranty for this information). If you have questions about a medical condition or this instruction, always ask your healthcare professional. [place] [address] any warranty or liability for your use of this information. High Cholesterol: Care Instructions  Your Care Instructions    Cholesterol is a type of fat in your blood. It is needed for many body functions, such as making new cells. Cholesterol is made by your body. It also comes from food you eat. High cholesterol means that you have too much of the fat in your blood. This raises your risk of a heart attack and stroke. LDL and HDL are part of your total cholesterol. LDL is the \"bad\" cholesterol. High LDL can raise your risk for heart disease, heart attack, and stroke. HDL is the \"good\" cholesterol. It helps clear bad cholesterol from the body. High HDL is linked with a lower risk of heart disease, heart attack, and stroke. Your cholesterol levels help your doctor find out your risk for having a heart attack or stroke. You and your doctor can talk about whether you need to lower your risk and what treatment is best for you. A heart-healthy lifestyle along with medicines can help lower your cholesterol and your risk. The way you choose to lower your risk will depend on how high your risk is for heart attack and stroke. It will also depend on how you feel about taking medicines. Follow-up care is a key part of your treatment and safety.  Be sure to make and go to all appointments, and call your doctor if you are having problems. It's also a good idea to know your test results and keep a list of the medicines you take. How can you care for yourself at home? · Eat a variety of foods every day. Good choices include fruits, vegetables, whole grains (like oatmeal), dried beans and peas, nuts and seeds, soy products (like tofu), and fat-free or low-fat dairy products. · Replace butter, margarine, and hydrogenated or partially hydrogenated oils with olive and canola oils. (Canola oil margarine without trans fat is fine.)  · Replace red meat with fish, poultry, and soy protein (like tofu). · Limit processed and packaged foods like chips, crackers, and cookies. · Bake, broil, or steam foods. Don't sebastian them. · Be physically active. Get at least 30 minutes of exercise on most days of the week. Walking is a good choice. You also may want to do other activities, such as running, swimming, cycling, or playing tennis or team sports. · Stay at a healthy weight or lose weight by making the changes in eating and physical activity listed above. Losing just a small amount of weight, even 5 to 10 pounds, can reduce your risk for having a heart attack or stroke. · Do not smoke. When should you call for help? Watch closely for changes in your health, and be sure to contact your doctor if:  ? · You need help making lifestyle changes. ? · You have questions about your medicine. Where can you learn more? Go to http://charissa-maria elena.info/. Enter G905 in the search box to learn more about \"High Cholesterol: Care Instructions. \"  Current as of: September 21, 2016  Content Version: 11.4  © 3829-9546 [employer]. Care instructions adapted under license by [employer] (which disclaims liability or warranty for this information).  If you have questions about a medical condition or this instruction, always ask your healthcare professional. Daniela Perkins any warranty or liability for your use of this information. Hypothyroidism: Care Instructions  Your Care Instructions    You have hypothyroidism, which means that your body is not making enough thyroid hormone. This hormone helps your body use energy. If your thyroid level is low, you may feel tired, be constipated, have an increase in your blood pressure, or have dry skin or memory problems. You may also get cold easily, even when it is warm. Women with low thyroid levels may have heavy menstrual periods. A blood test to find your thyroid-stimulating hormone (TSH) level is used to check for hypothyroidism. A high TSH level may mean that you have low thyroid. When your body is not making enough thyroid hormone, TSH levels rise in an effort to make the body produce more. The treatment for hypothyroidism is to take thyroid hormone pills. You should start to feel better in 1 to 2 weeks. But it can take several months to see changes in the TSH level. You will need regular visits with your doctor to make sure you have the right dose of medicine. Most people need treatment for the rest of their lives. You will need to see your doctor regularly to have blood tests and to make sure you are doing well. Follow-up care is a key part of your treatment and safety. Be sure to make and go to all appointments, and call your doctor if you are having problems. It's also a good idea to know your test results and keep a list of the medicines you take. How can you care for yourself at home? · Take your thyroid hormone medicine exactly as prescribed. Call your doctor if you think you are having a problem with your medicine. Most people do not have side effects if they take the right amount of medicine regularly. ¨ Take the medicine 30 minutes before breakfast, and do not take it with calcium, vitamins, or iron. ¨ Do not take extra doses of your thyroid medicine.  It will not help you get better any faster, and it may cause side effects. ¨ If you forget to take a dose, do NOT take a double dose of medicine. Take your usual dose the next day. · Tell your doctor about all prescription, herbal, or over-the-counter products you take. · Take care of yourself. Eat a healthy diet, get enough sleep, and get regular exercise. When should you call for help? Call 911 anytime you think you may need emergency care. For example, call if:  ? · You passed out (lost consciousness). ? · You have severe trouble breathing. ? · You have a very slow heartbeat (less than 60 beats a minute). ? · You have a low body temperature (95°F or below). ?Call your doctor now or seek immediate medical care if:  ? · You feel tired, sluggish, or weak. ? · You have trouble remembering things or concentrating. ? · You do not begin to feel better 2 weeks after starting your medicine. ? Watch closely for changes in your health, and be sure to contact your doctor if you have any problems. Where can you learn more? Go to http://charissa-maria elena.info/. Enter O810 in the search box to learn more about \"Hypothyroidism: Care Instructions. \"  Current as of: May 12, 2017  Content Version: 11.4  © 3201-1431 [employer]. Care instructions adapted under license by [email or web address] (which disclaims liability or warranty for this information). If you have questions about a medical condition or this instruction, always ask your healthcare professional. [place] [address] any warranty or liability for your use of this information.

## 2018-07-02 NOTE — LETTER
NOTIFICATION RETURN TO WORK / SCHOOL 
 
7/2/2018 4:55 PM 
 
Mr. Negrita Marie 
[address] [place] [id number] To Whom It May Concern: 
 
Negrita Marie is currently under the care of [facility]. He will return to work/school on: 6/5/18 If there are questions or concerns please have the patient contact our office. Sincerely, Claude Oliva NP

## 2018-07-02 NOTE — MR AVS SNAPSHOT
[address] [facility] 
 
 
 [address] 
[address] [place] [address] 
[phone] Patient: Arie Ruiz MRN: [id number] :1975 Visit Information Date & Time Provider Department Dept. Phone Encounter #  
 2018  4:45 PM Paulette Dorantes NP [facility] [id number] Follow-up Instructions Return in about 2 weeks (around 2018) for Medication Check, Hypertension, XOL, Vitamin D deficiency, Hypothyroidism. Your Appointments 2018  4:30 PM  
ROUTINE CARE with JANIYA Bartlett (ISRRAEL Pineda) Appt Note: 4 weeks follow up for Medication Check, XOL, Hypothyroidism, Depression, Vitamin D deficiency. [address] [facility] [address] [facility] [address]  
[phone]  
  
   
 [address] [address] [facility] [address] [facility] Upcoming Health Maintenance Date Due Pneumococcal 19-64 Highest Risk (1 of 3 - PCV13) 2018* DTaP/Tdap/Td series (1 - Tdap) 2019* Influenza Age 5 to Adult 2018 *Topic was postponed. The date shown is not the original due date. Allergies as of 2018  Review Complete On: 2018 By: Paulette Dorantes NP No Known Allergies Current Immunizations  Reviewed on 2018 No immunizations on file. Reviewed by Mikhail Quinones LPN on 2920 at  0:78 PM  
You Were Diagnosed With   
  
 Codes Comments Hypercholesterolemia    -  Primary ICD-10-CM: E78.00 ICD-9-CM: 272.0 Adjustment reaction with anxiety and depression     ICD-10-CM: F43.23 
ICD-9-CM: 309.28 Vitamin D deficiency     ICD-10-CM: E55.9 ICD-9-CM: 268.9 ADD (attention deficit disorder) without hyperactivity     ICD-10-CM: F98.8 ICD-9-CM: 314.00 Essential hypertension     ICD-10-CM: I10 
ICD-9-CM: 401.9 Hypothyroidism, unspecified type     ICD-10-CM: E03.9 ICD-9-CM: 477. 9 Vitals BP Pulse Temp Resp Height(growth percentile) Weight(growth percentile) 145/84 (BP 1 Location: Left arm, BP Patient Position: Sitting) 80 98.5 °F (36.9 °C) 18 6' 4.5\" (1.943 m) 252 lb 8 oz (114.5 kg) SpO2 BMI Smoking Status 95% 30.33 kg/m2 Former Smoker Vitals History BMI and BSA Data Body Mass Index Body Surface Area  
 30.33 kg/m 2 2.49 m 2 Preferred Pharmacy Pharmacy Name Phone [facility] [address] [phone] Your Updated Medication List  
  
   
This list is accurate as of 7/2/18  5:37 PM.  Always use your most recent med list.  
  
  
  
  
 atorvastatin 20 mg tablet Commonly known as:  LIPITOR Take 1 Tab by mouth daily. buPROPion  mg SR tablet Commonly known as:  Fairy [place] Take 1 Tab by mouth two (2) times a day. ergocalciferol 50,000 unit capsule Commonly known as:  ERGOCALCIFEROL Take 1 Cap by mouth every seven (7) days. ibuprofen 200 mg tablet Commonly known as:  MOTRIN Take 400 mg by mouth every eight (8) hours as needed for Pain.  
  
 lamoTRIgine 25 mg tablet Commonly known as: LaMICtal  
For the first two weeks, take 25mg daily, then weeks 3 & 4, take 50mg daily  
  
 levothyroxine 25 mcg tablet Commonly known as:  SYNTHROID Take 1 Tab by mouth Daily (before breakfast). lisinopril 10 mg tablet Commonly known as:  Christine Loss Take 1 Tab by mouth daily. multivitamin tablet Commonly known as:  ONE A DAY Take 1 Tab by mouth daily. Follow-up Instructions Return in about 2 weeks (around 7/16/2018) for Medication Check, Hypertension, XOL, Vitamin D deficiency, Hypothyroidism. Patient Instructions Adjustment Disorder: Care Instructions Your Care Instructions Adjustment disorder means that you have emotional or behavioral problems because of stress. But your response to the stress is far more severe than a normal response. It is severe enough to affect your work or social life and may cause depression and physical pains and problems. Events that may cause this response can include a divorce, money problems, or starting school or a new job. It might be anything that causes some stress. This disorder is most often a short-term problem. It happens within 3 months of the stressful event or change. If the response lasts longer than 6 months after the event ends, you may have a more serious disorder. Follow-up care is a key part of your treatment and safety. Be sure to make and go to all appointments, and call your doctor if you are having problems. It's also a good idea to know your test results and keep a list of the medicines you take. How can you care for yourself at home? · Go to all counseling sessions. Do not skip any because you are feeling better. · If your doctor prescribed medicines, take them exactly as prescribed. Call your doctor if you think you are having a problem with your medicine. You will get more details on the specific medicines your doctor prescribes. · Discuss the causes of your stress with a good friend or family member. Or you can join a support group for people with similar problems. Talking to others sometimes relieves stress. · Get at least 30 minutes of exercise on most days of the week. Walking is a good choice. You also may want to do other activities, such as running, swimming, cycling, or playing tennis or team sports. Relaxation techniques Do relaxation exercises 10 to 20 minutes a day. You can play soothing, relaxing music while you do them, if you wish. · Tell others in your house that you are going to do your relaxation exercises. Ask them not to disturb you. · Find a comfortable, quiet place. · Lie down on your back, or sit with your back straight. · Focus on your breathing. Make it slow and steady. · Breathe in through your nose. Breathe out through either your nose or mouth. · Breathe deeply, filling up the area between your navel and your rib cage. Breathe so that your belly goes up and down. · Do not hold your breath. · Breathe like this for 5 to 10 minutes. Notice the feeling of calmness throughout your whole body. As you continue to breathe slowly and deeply, relax by doing these next steps for another 5 to 10 minutes: · Tighten and relax each muscle group in your body. Start at your toes, and work your way up to your head. · Imagine your muscle groups relaxing and getting heavy. · Empty your mind of all thoughts. · Let yourself relax more and more deeply. · Be aware of the state of calmness that surrounds you. · When your relaxation time is over, you can bring yourself back to alertness by moving your fingers and toes. Then move your hands and feet. And then move your entire body. Sometimes people fall asleep during relaxation. But they most often wake up soon. · Always give yourself time to return to full alertness before you drive a car. Wait to do anything that might cause an accident if you are not fully alert. Never play a relaxation tape while you drive a car. When should you call for help? Call 911 anytime you think you may need emergency care. For example, call if: 
? · You feel you cannot stop from hurting yourself or someone else. Keep the numbers for these national suicide hotlines: 1-860-471-TALK (4-103.882.9820) and 2-674-VAJPQDK (4-383.478.8478). If you or someone you know talks about suicide or feeling hopeless, get help right away. ? Watch closely for changes in your health, and be sure to contact your doctor if: 
? · You have new anxiety, or your anxiety gets worse. ? · You have been feeling sad, depressed, or hopeless or have lost interest in things that you usually enjoy. ? · You do not get better as expected. Where can you learn more? Go to http://charissa-maria elena.info/. Enter [id number] [id number] in the search box to learn more about \"Adjustment Disorder: Care Instructions. \" Current as of: July 26, 2016 Content Version: 11.4 © 1607-8254 [employer]. Care instructions adapted under license by [employer] (which disclaims liability or warranty for this information). If you have questions about a medical condition or this instruction, always ask your healthcare professional. [place] [address] any warranty or liability for your use of this information. High Cholesterol: Care Instructions Your Care Instructions Cholesterol is a type of fat in your blood. It is needed for many body functions, such as making new cells. Cholesterol is made by your body. It also comes from food you eat. High cholesterol means that you have too much of the fat in your blood. This raises your risk of a heart attack and stroke. LDL and HDL are part of your total cholesterol. LDL is the \"bad\" cholesterol. High LDL can raise your risk for heart disease, heart attack, and stroke. HDL is the \"good\" cholesterol. It helps clear bad cholesterol from the body. High HDL is linked with a lower risk of heart disease, heart attack, and stroke. Your cholesterol levels help your doctor find out your risk for having a heart attack or stroke. You and your doctor can talk about whether you need to lower your risk and what treatment is best for you. A heart-healthy lifestyle along with medicines can help lower your cholesterol and your risk. The way you choose to lower your risk will depend on how high your risk is for heart attack and stroke. It will also depend on how you feel about taking medicines. Follow-up care is a key part of your treatment and safety.  Be sure to make and go to all appointments, and call your doctor if you are having problems. It's also a good idea to know your test results and keep a list of the medicines you take. How can you care for yourself at home? · Eat a variety of foods every day. Good choices include fruits, vegetables, whole grains (like oatmeal), dried beans and peas, nuts and seeds, soy products (like tofu), and fat-free or low-fat dairy products. · Replace butter, margarine, and hydrogenated or partially hydrogenated oils with olive and canola oils. (Canola oil margarine without trans fat is fine.) · Replace red meat with fish, poultry, and soy protein (like tofu). · Limit processed and packaged foods like chips, crackers, and cookies. · Bake, broil, or steam foods. Don't sebastian them. · Be physically active. Get at least 30 minutes of exercise on most days of the week. Walking is a good choice. You also may want to do other activities, such as running, swimming, cycling, or playing tennis or team sports. · Stay at a healthy weight or lose weight by making the changes in eating and physical activity listed above. Losing just a small amount of weight, even 5 to 10 pounds, can reduce your risk for having a heart attack or stroke. · Do not smoke. When should you call for help? Watch closely for changes in your health, and be sure to contact your doctor if: 
? · You need help making lifestyle changes. ? · You have questions about your medicine. Where can you learn more? Go to http://charissa-maria elena.info/. Enter L895 in the search box to learn more about \"High Cholesterol: Care Instructions. \" Current as of: September 21, 2016 Content Version: 11.4 © 8901-2668 [email or web address]. Care instructions adapted under license by [employer] (which disclaims liability or warranty for this information).  If you have questions about a medical condition or this instruction, always ask your healthcare professional. Dee [employer] disclaims any warranty or liability for your use of this information. Hypothyroidism: Care Instructions Your Care Instructions You have hypothyroidism, which means that your body is not making enough thyroid hormone. This hormone helps your body use energy. If your thyroid level is low, you may feel tired, be constipated, have an increase in your blood pressure, or have dry skin or memory problems. You may also get cold easily, even when it is warm. Women with low thyroid levels may have heavy menstrual periods. A blood test to find your thyroid-stimulating hormone (TSH) level is used to check for hypothyroidism. A high TSH level may mean that you have low thyroid. When your body is not making enough thyroid hormone, TSH levels rise in an effort to make the body produce more. The treatment for hypothyroidism is to take thyroid hormone pills. You should start to feel better in 1 to 2 weeks. But it can take several months to see changes in the TSH level. You will need regular visits with your doctor to make sure you have the right dose of medicine. Most people need treatment for the rest of their lives. You will need to see your doctor regularly to have blood tests and to make sure you are doing well. Follow-up care is a key part of your treatment and safety. Be sure to make and go to all appointments, and call your doctor if you are having problems. It's also a good idea to know your test results and keep a list of the medicines you take. How can you care for yourself at home? · Take your thyroid hormone medicine exactly as prescribed. Call your doctor if you think you are having a problem with your medicine. Most people do not have side effects if they take the right amount of medicine regularly. ¨ Take the medicine 30 minutes before breakfast, and do not take it with calcium, vitamins, or iron. ¨ Do not take extra doses of your thyroid medicine.  It will not help you get better any faster, and it may cause side effects. ¨ If you forget to take a dose, do NOT take a double dose of medicine. Take your usual dose the next day. · Tell your doctor about all prescription, herbal, or over-the-counter products you take. · Take care of yourself. Eat a healthy diet, get enough sleep, and get regular exercise. When should you call for help? Call 911 anytime you think you may need emergency care. For example, call if: 
? · You passed out (lost consciousness). ? · You have severe trouble breathing. ? · You have a very slow heartbeat (less than 60 beats a minute). ? · You have a low body temperature (95°F or below). ?Call your doctor now or seek immediate medical care if: 
? · You feel tired, sluggish, or weak. ? · You have trouble remembering things or concentrating. ? · You do not begin to feel better 2 weeks after starting your medicine. ? Watch closely for changes in your health, and be sure to contact your doctor if you have any problems. Where can you learn more? Go to http://charissa-maria elena.info/. Enter D781 in the search box to learn more about \"Hypothyroidism: Care Instructions. \" Current as of: May 12, 2017 Content Version: 11.4 © 9386-1152 [employer]. Care instructions adapted under license by [employer] (which disclaims liability or warranty for this information). If you have questions about a medical condition or this instruction, always ask your healthcare professional. [place] [address] any warranty or liability for your use of this information. Introducing [facility] & HEALTH SERVICES! [facility] introduces [employer] patient portal. Now you can access parts of your medical record, email your doctor's office, and request medication refills online. 1. In your internet browser, go to [email or web address] 2. Click on the First Time User? Click Here link in the Sign In box.  You will see the New Member Sign Up page. 3. Enter your TickTickTickets Access Code exactly as it appears below. You will not need to use this code after youve completed the sign-up process. If you do not sign up before the expiration date, you must request a new code. · TickTickTickets Access Code: [email or web address] Expires: 9/10/2018  4:04 PM 
 
4. Enter the last four digits of your Social Security Number (xxxx) and Date of Birth (mm/dd/yyyy) as indicated and click Submit. You will be taken to the next sign-up page. 5. Create a TickTickTickets ID. This will be your TickTickTickets login ID and cannot be changed, so think of one that is secure and easy to remember. 6. Create a TickTickTickets password. You can change your password at any time. 7. Enter your Password Reset Question and Answer. This can be used at a later time if you forget your password. 8. Enter your e-mail address. You will receive e-mail notification when new information is available in [address]. 9. Click Sign Up. You can now view and download portions of your medical record. 10. Click the Download Summary menu link to download a portable copy of your medical information. If you have questions, please visit the Frequently Asked Questions section of the TickTickTickets website. Remember, TickTickTickets is NOT to be used for urgent needs. For medical emergencies, dial 911. Now available from your iPhone and Android! Please provide this summary of care documentation to your next provider. Your primary care clinician is listed as Stephany Linares. Yosi Harrison. If you have any questions after today's visit, please call [phone].

## 2018-07-02 NOTE — LETTER
NOTIFICATION RETURN TO WORK / SCHOOL 
 
7/2/2018 5:44 PM 
 
Mr. Evelina James 
[facility] [address] [id number] To Whom It May Concern: 
 
Evelina James is currently under the care of Barrington Del Real. He will return to work on 7/5/18 If there are questions or concerns please have the patient contact our office. Sincerely, Amado Turner NP

## 2018-07-02 NOTE — PROGRESS NOTES
Ирина Salas  Identified pt with two pt identifiers(name and ). Chief Complaint   Patient presents with    Advice Only     Rm4: paperwork       1. Have you been to the ER, urgent care clinic since your last visit? Hospitalized since your last visit? no    2. Have you seen or consulted any other health care providers outside of the [facility] since your last visit? Include any pap smears or colon screening. no    Today's provider has been notified of reason for visit, vitals and flowsheets obtained on patients. Patient received paperwork for advance directive during previous visit but has not completed at this time     Reviewed record In preparation for visit, huddled with provider and have obtained necessary documentation      There are no preventive care reminders to display for this patient.     Wt Readings from Last 3 Encounters:   18 252 lb 8 oz (114.5 kg)   18 252 lb 14.4 oz (114.7 kg)   18 251 lb 3.2 oz (113.9 kg)     Temp Readings from Last 3 Encounters:   18 98.5 °F (36.9 °C)   18 98.7 °F (37.1 °C)   18 98.5 °F (36.9 °C) (Oral)     BP Readings from Last 3 Encounters:   18 145/84   18 128/89   18 116/83     Pulse Readings from Last 3 Encounters:   18 78   18 82   18 80     Vitals:    18 1655   BP: 145/84   Resp: 18   Temp: 98.5 °F (36.9 °C)   SpO2: 95%   Weight: 252 lb 8 oz (114.5 kg)   Height: 6' 4.5\" (1.943 m)   PainSc:   0 - No pain         Learning Assessment:  :     Learning Assessment 2015   PRIMARY LEARNER Patient   PRIMARY LANGUAGE ENGLISH   LEARNER PREFERENCE PRIMARY OTHER (COMMENT)   ANSWERED BY pt   RELATIONSHIP SELF       Depression Screening:  :     PHQ over the last two weeks 2018   Little interest or pleasure in doing things Nearly every day   Feeling down, depressed or hopeless Nearly every day   Total Score PHQ 2 6   Trouble falling or staying asleep, or sleeping too much Nearly every day   Feeling tired or having little energy Nearly every day   Poor appetite or overeating Nearly every day   Feeling bad about yourself - or that you are a failure or have let yourself or your family down Nearly every day   Trouble concentrating on things such as school, work, reading or watching TV Nearly every day   Moving or speaking so slowly that other people could have noticed; or the opposite being so fidgety that others notice Not at all   Thoughts of being better off dead, or hurting yourself in some way Several days   PHQ 9 Score 22   How difficult have these problems made it for you to do your work, take care of your home and get along with others Very difficult       Fall Risk Assessment:  :     No flowsheet data found. Abuse Screening:  :     No flowsheet data found. ADL Screening:  :     ADL Assessment 6/5/2018   Feeding yourself No Help Needed   Getting from bed to chair No Help Needed   Getting dressed No Help Needed   Bathing or showering No Help Needed   Walk across the room (includes cane/walker) No Help Needed   Using the telphone No Help Needed   Taking your medications No Help Needed   Preparing meals No Help Needed   Managing money (expenses/bills) No Help Needed   Moderately strenuous housework (laundry) No Help Needed   Shopping for personal items (toiletries/medicines) No Help Needed   Shopping for groceries No Help Needed   Driving No Help Needed   Climbing a flight of stairs No Help Needed   Getting to places beyond walking distances No Help Needed                 Medication reconciliation up to date and corrected with patient at this time.

## 2018-07-02 NOTE — PROGRESS NOTES
Chief Complaint   Patient presents with    Advice Only     Rm4: paperwork         HPI:  The patient is a 40 y.o. male who presents today for a follow up appointment. No hospital, ER or specialist visits since last primary care visit except as noted below. ADD:  Pt currently not taking Amphetamines. He reports his focus and concentration is well controlled without medications at this time. Depression:  He has been taking Wellbutrin BID with great improvement. He does report fatigue, states he previous PCP gave him a Vit B12 shot with good effectiveness. He reports considerably worsening depression this week, he reports 2-3 episodes of SI, he denies a plan and felt \"it would be ok if he did not wake up tomorrow\". At his last visit on 6/29/2018, he was started on Lamictal 25mg daily. He will increase to 50mg daily in approximately 1 week. He reports his depressive symptoms are markedly improved. PHQ over the last two weeks 7/2/2018   Little interest or pleasure in doing things Several days   Feeling down, depressed or hopeless Several days   Total Score PHQ 2 2   Trouble falling or staying asleep, or sleeping too much -   Feeling tired or having little energy -   Poor appetite or overeating -   Feeling bad about yourself - or that you are a failure or have let yourself or your family down -   Trouble concentrating on things such as school, work, reading or watching TV -   Moving or speaking so slowly that other people could have noticed; or the opposite being so fidgety that others notice -   Thoughts of being better off dead, or hurting yourself in some way -   PHQ 9 Score -   How difficult have these problems made it for you to do your work, take care of your home and get along with others -       HTN:  He stopped taking Lisinopril and was normotensive at his last visit on 6/29/2018. He denies CP/SOB/palpitations, HECTOR, and HA.   BP Readings from Last 3 Encounters:   07/02/18 145/84   06/29/18 128/89   06/27/18 116/83      Labs: most recent labs, started Lipitor for XOL, continued Vit D for Vit D deficiency, started synthroid 25mcg for elevated TSH. We will need to recheck your lipids, Vit D, and TSH. Review of Systems  A comprehensive review of systems was negative except for that written in the HPI.     Patient Active Problem List   Diagnosis Code    Chronic tension-type headache, intractable G44.221    Migraine without aura and without status migrainosus, not intractable G43.009    Adjustment reaction with anxiety and depression F43.23    Vitamin D deficiency E55.9    Hypercholesterolemia E78.00    Hypertension I10    GERD (gastroesophageal reflux disease) K21.9    ADD (attention deficit disorder) without hyperactivity F98.8    Moderate major depression (HCC) F32.1    Amphetamine abuse, episodic F15.10    Vitamin B12 deficiency E53.8    Hypothyroidism E03.9    Fatigue R53.83       Past Medical History:   Diagnosis Date    ADD (attention deficit disorder) without hyperactivity 6/7/2018    Adjustment reaction with anxiety and depression     Attention deficit hyperactivity disorder (ADHD), predominantly inattentive type 6/7/2018    Cancer ([facility] [address]     testicular     Cancer ([facility] [address] 2010    Testicular    Community acquired pneumonia     Depression     GERD (gastroesophageal reflux disease)     Headache     Hypercholesterolemia     Hypertension     Vitamin D deficiency        Past Surgical History:   Procedure Laterality Date    HX ORCHIECTOMY Right 2010    HX UROLOGICAL      removed on e testicle    HX WISDOM TEETH EXTRACTION  1996       Social History   Substance Use Topics    Smoking status: Former Smoker     Packs/day: 1.00     Years: 8.00     Types: Cigarettes     Quit date: 2008    Smokeless tobacco: Never Used    Alcohol use 6.0 oz/week     24 Cans of beer per week       Family History   Problem Relation Age of Onset    Cancer Mother      Ovarian    Hypertension Mother  Cancer Father      Throat    Hypertension Father     Arrhythmia Brother      Afib    No Known Problems Maternal Grandmother     No Known Problems Maternal Grandfather     No Known Problems Paternal Grandmother     No Known Problems Paternal Grandfather        Outpatient Prescriptions Marked as Taking for the 7/2/18 encounter (Office Visit) with Lashanda Crandall NP   Medication Sig Dispense Refill    atorvastatin (LIPITOR) 20 mg tablet Take 1 Tab by mouth daily. 30 Tab 1    levothyroxine (SYNTHROID) 25 mcg tablet Take 1 Tab by mouth Daily (before breakfast). 30 Tab 1    ergocalciferol (ERGOCALCIFEROL) 50,000 unit capsule Take 1 Cap by mouth every seven (7) days. 5 Cap 2    lamoTRIgine (LAMICTAL) 25 mg tablet For the first two weeks, take 25mg daily, then weeks 3 & 4, take 50mg daily 42 Tab 0    lisinopril (PRINIVIL, ZESTRIL) 10 mg tablet Take 1 Tab by mouth daily. 90 Tab 3    multivitamin (ONE A DAY) tablet Take 1 Tab by mouth daily.  ibuprofen (MOTRIN) 200 mg tablet Take 400 mg by mouth every eight (8) hours as needed for Pain.  buPROPion SR (WELLBUTRIN SR) 150 mg SR tablet Take 1 Tab by mouth two (2) times a day.  61 Tab 0       No Known Allergies    PE:  Visit Vitals    /84 (BP 1 Location: Left arm, BP Patient Position: Sitting)    Temp 98.5 °F (36.9 °C)    Resp 18    Ht 6' 4.5\" (1.943 m)    Wt 252 lb 8 oz (114.5 kg)    SpO2 95%    BMI 30.33 kg/m2     Gen: alert, oriented, no acute distress  Head: normocephalic, atraumatic  Ears: external auditory canals clear, TMs without erythema or effusion  Eyes: pupils equal round reactive to light, sclera clear, conjunctiva clear  Oral: moist mucus membranes, no oral lesions, no pharyngeal inflammation or exudate  Neck: symmetric normal sized thyroid, no carotid bruits, no jugular vein distention  Resp: no increase work of breathing, lungs clear to ausculation bilaterally, no wheezing, rales or rhonchi  CV: S1, S2 normal.  No murmurs, rubs, or gallops. Abd: soft, not tender, not distended. No hepatosplenomegaly. Normal bowel sounds. No hernias. No abdominal or renal bruits. Neuro: cranial nerves intact, normal strength and movement in all extremities, reflexes and sensation intact and symmetric. Skin: no lesion or rash  Extremities: no cyanosis or edema    Assessment/Plan:    ICD-10-CM ICD-9-CM    1. Hypercholesterolemia E78.00 272.0    2. Adjustment reaction with anxiety and depression F43.23 309.28    3. Vitamin D deficiency E55.9 268.9    4. ADD (attention deficit disorder) without hyperactivity F98.8 314.00    5. Essential hypertension I10 401.9    6. Hypothyroidism, unspecified type E03.9 244.9      Follow-up Disposition:  Return in about 2 weeks (around 7/16/2018) for Medication Check, Hypertension, XOL, Vitamin D deficiency, Hypothyroidism. lab results and schedule of future lab studies reviewed with patient  reviewed diet, exercise and weight control  cardiovascular risk and specific lipid/LDL goals reviewed  reviewed medications and side effects in detail  We will need to recheck your lipids, Vit D, and TSH at his next visit in 2 weeks. He will RTW on 7/5/2018 without restrictions. Paperwork completed, scanned, and original given to the patient for his employer. Health Maintenance reviewed - reviewed,UTD. Recommended healthy diet low in carbohydrates, fats, sodium and cholesterol. Recommended regular cardiovascular exercise 3-6 times per week for 30-60 minutes daily. Chart is reviewed and updated today in the office. Records requested for other providers patient has seen and is currently seeing. Patient was offered a choice/choices in the treatment plan today. Patient expresses understanding of the plan and agrees with recommendations. Verbal and written instructions (see AVS) provided. See patient instructions for more. Patient expresses understanding of diagnosis and treatment plan.

## 2018-07-03 LAB
APPEARANCE UR: CLEAR
BILIRUB UR QL STRIP: NEGATIVE
COLOR UR: YELLOW
DRUGS UR: NORMAL
GLUCOSE UR QL: NEGATIVE
HGB UR QL STRIP: NEGATIVE
KETONES UR QL STRIP: NEGATIVE
LEUKOCYTE ESTERASE UR QL STRIP: NEGATIVE
MICRO URNS: NORMAL
NITRITE UR QL STRIP: NEGATIVE
PH UR STRIP: 5 [PH] (ref 5–7.5)
PROT UR QL STRIP: NEGATIVE
SP GR UR: 1.02 (ref 1–1.03)
UROBILINOGEN UR STRIP-MCNC: 0.2 MG/DL (ref 0.2–1)

## 2018-07-03 NOTE — PROGRESS NOTES
Your lab results have been reviewed and are as follows:    Urine Test:  Your urine analysis is normal.    Your urine drug test is as expected. Divya Gutierrez, MSN, MHA, FNP-BC  Reviewed with the patient at his office visit.

## 2018-07-09 ENCOUNTER — OFFICE VISIT (OUTPATIENT)
Dept: FAMILY MEDICINE CLINIC | Age: 43
End: 2018-07-09

## 2018-07-09 VITALS
DIASTOLIC BLOOD PRESSURE: 99 MMHG | HEIGHT: 77 IN | BODY MASS INDEX: 30.03 KG/M2 | RESPIRATION RATE: 17 BRPM | HEART RATE: 81 BPM | TEMPERATURE: 96.6 F | OXYGEN SATURATION: 98 % | SYSTOLIC BLOOD PRESSURE: 140 MMHG | WEIGHT: 254.3 LBS

## 2018-07-09 DIAGNOSIS — I10 ESSENTIAL HYPERTENSION: ICD-10-CM

## 2018-07-09 DIAGNOSIS — T23.261A PARTIAL THICKNESS BURN OF BACK OF RIGHT HAND, INITIAL ENCOUNTER: ICD-10-CM

## 2018-07-09 DIAGNOSIS — F32.1 MODERATE MAJOR DEPRESSION (HCC): ICD-10-CM

## 2018-07-09 DIAGNOSIS — E03.9 HYPOTHYROIDISM, UNSPECIFIED TYPE: ICD-10-CM

## 2018-07-09 DIAGNOSIS — T23.262A PARTIAL THICKNESS BURN OF BACK OF LEFT HAND, INITIAL ENCOUNTER: Primary | ICD-10-CM

## 2018-07-09 DIAGNOSIS — F98.8 ADD (ATTENTION DEFICIT DISORDER) WITHOUT HYPERACTIVITY: ICD-10-CM

## 2018-07-09 PROBLEM — F15.10 AMPHETAMINE ABUSE, EPISODIC (HCC): Status: RESOLVED | Noted: 2018-06-25 | Resolved: 2018-07-09

## 2018-07-09 RX ORDER — SILVER SULFADIAZINE 10 G/1000G
CREAM TOPICAL DAILY
Qty: 50 G | Refills: 0 | Status: SHIPPED | OUTPATIENT
Start: 2018-07-09 | End: 2018-07-27 | Stop reason: ALTCHOICE

## 2018-07-09 RX ORDER — DEXTROAMPHETAMINE SACCHARATE, AMPHETAMINE ASPARTATE MONOHYDRATE, DEXTROAMPHETAMINE SULFATE AND AMPHETAMINE SULFATE 7.5; 7.5; 7.5; 7.5 MG/1; MG/1; MG/1; MG/1
30 CAPSULE, EXTENDED RELEASE ORAL
Qty: 30 CAP | Refills: 0 | Status: SHIPPED | OUTPATIENT
Start: 2018-07-09 | End: 2018-08-01 | Stop reason: ALTCHOICE

## 2018-07-09 RX ORDER — HYDROCODONE BITARTRATE AND ACETAMINOPHEN 10; 325 MG/1; MG/1
1 TABLET ORAL
Qty: 60 TAB | Refills: 0 | Status: SHIPPED | OUTPATIENT
Start: 2018-07-09 | End: 2018-07-13 | Stop reason: ALTCHOICE

## 2018-07-09 NOTE — PATIENT INSTRUCTIONS
[facility]: Care Instructions  Your Care Instructions    Burns-even minor ones-can be very painful. A minor burn may heal within several days, while a more serious burn may take weeks or even months to heal completely. You may notice that the burned area feels tight and hard while it is healing. It is important to continue to move the area as the burn heals to prevent loss of motion or loss of function in the area. When your skin is damaged by a burn, you have a greater risk of infection. Keep the wound clean and change the bandages regularly to prevent infection and help the burn heal.  Burns can leave permanent scars. Taking good care of the burn as it heals may help prevent bad scars. The doctor has checked you carefully, but problems can develop later. If you notice any problems or new symptoms, get medical treatment right away. Follow-up care is a key part of your treatment and safety. Be sure to make and go to all appointments, and call your doctor if you are having problems. It's also a good idea to know your test results and keep a list of the medicines you take. How can you care for yourself at home? · If your doctor told you how to care for your burn, follow your doctor's instructions. If you did not get instructions, follow this general advice:  ¨ Wash the burn with clean water 2 times a day. Don't use hydrogen peroxide or alcohol, which can slow healing. ¨ Gently pat the burn dry after you wash it. ¨ You may cover the burn with a thin layer of petroleum jelly, such as Vaseline, and a nonstick bandage. ¨ Apply more petroleum jelly and replace the bandage as needed. · Protect your burn while it is healing. Cover your burn if you are going out in the cold or the sun. ¨ Wear long sleeves if the burn is on your hands or arms. ¨ Wear a hat if the burn is on your face. ¨ Wear socks and shoes if the burn is on your feet. · Do not break blisters open. This increases the chance of infection.  If a blister breaks open by itself, blot up the liquid, and leave the skin that covered the blister. This helps protect the new skin. · If your doctor prescribed antibiotics, take them as directed. Do not stop taking them just because you feel better. You need to take the full course of antibiotics. For pain and itching  · Take pain medicines exactly as directed. ¨ If the doctor gave you a prescription medicine for pain, take it as prescribed. ¨ If you are not taking a prescription pain medicine, ask your doctor if you can take an over-the-counter medicine. · If the burn itches, try not to scratch it. Try an over-the-counter antihistamine such as diphenhydramine (Benadryl) or loratadine (Claritin). Read and follow all instructions on the label. When should you call for help? Call your doctor now or seek immediate medical care if:  ? · Your pain gets worse. ? · You have symptoms of infection, such as:  ¨ Increased pain, swelling, warmth, or redness near the burn. ¨ Red streaks leading from the burn. ¨ Pus draining from the burn. ¨ A fever. ? Watch closely for changes in your health, and be sure to contact your doctor if:  ? · You do not get better as expected. Where can you learn more? Go to http://charissa-maria elena.info/. Enter E881 in the search box to learn more about \"Burns: Care Instructions. \"  Current as of: March 20, 2017  Content Version: 11.4  © 8324-8774 [employer] Care instructions adapted under license by [employer] (which disclaims liability or warranty for this information). If you have questions about a medical condition or this instruction, always ask your healthcare professional. [place] [address] any warranty or liability for your use of this information.        Attention Deficit Hyperactivity Disorder (ADHD) in Adults: Care Instructions  Your Care Instructions    Attention deficit hyperactivity disorder, or ADHD, is a condition that makes it hard to pay attention. So you may have problems when you try to focus, get organized, and finish tasks. It might make you more active than other people. Or you might do things without thinking first.  ADHD is very common. It usually starts in early childhood. Many adults don't realize they have it until their children are diagnosed. Then they become aware of their own symptoms. Doctors don't know what causes ADHD. But it often runs in families. ADHD can be treated with medicines, behavior training, and counseling. Treatment can improve your life. Follow-up care is a key part of your treatment and safety. Be sure to make and go to all appointments, and call your doctor if you are having problems. It's also a good idea to know your test results and keep a list of the medicines you take. How can you care for yourself at home? · Learn all you can about ADHD. This will help you and your family understand it better. · Take your medicines exactly as prescribed. Call your doctor if you think you are having a problem with your medicine. You will get more details on the specific medicines your doctor prescribes. · If you miss a dose of your medicine, do not take an extra dose. · If your doctor suggests counseling, find a counselor you like and trust. Talk openly and honestly. Be willing to make some changes. · Find a support group for adults with ADHD. Talking to others with the same problems can help you feel better. It can also give you ideas about how to best cope with the condition. · Get rid of distractions at your work space. Keep your desk clean. Try not to face a window or busy hallway. · Use files, planners, and other tools to keep you organized. · Limit use of alcohol, and do not use illegal drugs. People with ADHD tend to become addicted more easily than others. Tell your doctor if you need help to quit.  Counseling, support groups, and sometimes medicines can help you stay free of alcohol or drugs. · Get at least 30 minutes of physical activity on most days of the week. Exercise has been shown to help people cope with ADHD. Walking is a good choice. You also may want to do other activities, such as running, swimming, cycling, or playing tennis or team sports. When should you call for help? Watch closely for changes in your health, and be sure to contact your doctor if:  ? · You feel sad a lot or cry all the time. ? · You have trouble sleeping, or you sleep too much. ? · You find it hard to concentrate, make decisions, or remember things. ? · You change how you normally eat. ? · You feel guilty for no reason. Where can you learn more? Go to http://charissa-maria elena.info/. Enter B196 in the search box to learn more about \"Attention Deficit Hyperactivity Disorder (ADHD) in Adults: Care Instructions. \"  Current as of: May 12, 2017  Content Version: 11.4  © 8918-4822 [employer]. Care instructions adapted under license by [employer] (which disclaims liability or warranty for this information). If you have questions about a medical condition or this instruction, always ask your healthcare professional. [address] any warranty or liability for your use of this information.
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
## 2018-07-09 NOTE — PROGRESS NOTES
Arie Ruiz  Identified pt with two pt identifiers(name and ). Chief Complaint   Patient presents with    Follow-up     Rm 4;        1. Have you been to the ER, urgent care clinic since your last visit? Hospitalized since your last visit? no    2. Have you seen or consulted any other health care providers outside of the [facility] since your last visit? Include any pap smears or colon screening. no    Today's provider has been notified of reason for visit, vitals and flowsheets obtained on patients. Patient received paperwork for advance directive during previous visit but has not completed at this time     Reviewed record In preparation for visit, huddled with provider and have obtained necessary documentation      There are no preventive care reminders to display for this patient.     Wt Readings from Last 3 Encounters:   18 254 lb 4.8 oz (115.3 kg)   18 252 lb 8 oz (114.5 kg)   18 252 lb 14.4 oz (114.7 kg)     Temp Readings from Last 3 Encounters:   18 96.6 °F (35.9 °C) (Temporal)   18 98.5 °F (36.9 °C)   18 98.7 °F (37.1 °C)     BP Readings from Last 3 Encounters:   18 (!) 140/99   18 145/84   18 128/89     Pulse Readings from Last 3 Encounters:   18 81   18 80   18 78     Vitals:    18 1635   BP: (!) 140/99   Pulse: 81   Resp: 17   Temp: 96.6 °F (35.9 °C)   TempSrc: Temporal   SpO2: 98%   Weight: 254 lb 4.8 oz (115.3 kg)   Height: 6' 4.5\" (1.943 m)   PainSc:   0 - No pain         Learning Assessment:  :     Learning Assessment 2015   PRIMARY LEARNER Patient   PRIMARY LANGUAGE ENGLISH   LEARNER PREFERENCE PRIMARY OTHER (COMMENT)   ANSWERED BY pt   RELATIONSHIP SELF       Depression Screening:  :     PHQ over the last two weeks 2018   Little interest or pleasure in doing things Several days   Feeling down, depressed or hopeless Several days   Total Score PHQ 2 2   Trouble falling or staying asleep, or sleeping too much -   Feeling tired or having little energy -   Poor appetite or overeating -   Feeling bad about yourself - or that you are a failure or have let yourself or your family down -   Trouble concentrating on things such as school, work, reading or watching TV -   Moving or speaking so slowly that other people could have noticed; or the opposite being so fidgety that others notice -   Thoughts of being better off dead, or hurting yourself in some way -   PHQ 9 Score -   How difficult have these problems made it for you to do your work, take care of your home and get along with others -       Fall Risk Assessment:  :     No flowsheet data found. Abuse Screening:  :     No flowsheet data found. ADL Screening:  :     ADL Assessment 6/5/2018   Feeding yourself No Help Needed   Getting from bed to chair No Help Needed   Getting dressed No Help Needed   Bathing or showering No Help Needed   Walk across the room (includes cane/walker) No Help Needed   Using the telphone No Help Needed   Taking your medications No Help Needed   Preparing meals No Help Needed   Managing money (expenses/bills) No Help Needed   Moderately strenuous housework (laundry) No Help Needed   Shopping for personal items (toiletries/medicines) No Help Needed   Shopping for groceries No Help Needed   Driving No Help Needed   Climbing a flight of stairs No Help Needed   Getting to places beyond walking distances No Help Needed                 Medication reconciliation up to date and corrected with patient at this time.

## 2018-07-13 ENCOUNTER — OFFICE VISIT (OUTPATIENT)
Dept: FAMILY MEDICINE CLINIC | Age: 43
End: 2018-07-13

## 2018-07-13 VITALS
SYSTOLIC BLOOD PRESSURE: 134 MMHG | BODY MASS INDEX: 29.83 KG/M2 | HEIGHT: 77 IN | HEART RATE: 104 BPM | RESPIRATION RATE: 19 BRPM | DIASTOLIC BLOOD PRESSURE: 107 MMHG | OXYGEN SATURATION: 97 % | TEMPERATURE: 97.9 F | WEIGHT: 252.6 LBS

## 2018-07-13 DIAGNOSIS — F98.8 ADD (ATTENTION DEFICIT DISORDER) WITHOUT HYPERACTIVITY: ICD-10-CM

## 2018-07-13 DIAGNOSIS — T23.261D PARTIAL THICKNESS BURN OF BACK OF RIGHT HAND, SUBSEQUENT ENCOUNTER: ICD-10-CM

## 2018-07-13 DIAGNOSIS — I10 ESSENTIAL HYPERTENSION: ICD-10-CM

## 2018-07-13 DIAGNOSIS — F43.23 ADJUSTMENT REACTION WITH ANXIETY AND DEPRESSION: ICD-10-CM

## 2018-07-13 DIAGNOSIS — T23.262D PARTIAL THICKNESS BURN OF BACK OF LEFT HAND, SUBSEQUENT ENCOUNTER: Primary | ICD-10-CM

## 2018-07-13 DIAGNOSIS — F32.1 MODERATE MAJOR DEPRESSION (HCC): ICD-10-CM

## 2018-07-13 RX ORDER — BUPROPION HYDROCHLORIDE 150 MG/1
150 TABLET, EXTENDED RELEASE ORAL 2 TIMES DAILY
Qty: 60 TAB | Refills: 0 | Status: SHIPPED | OUTPATIENT
Start: 2018-07-13 | End: 2018-08-01 | Stop reason: SDUPTHER

## 2018-07-13 RX ORDER — LISINOPRIL 10 MG/1
10 TABLET ORAL DAILY
Qty: 90 TAB | Refills: 3 | Status: SHIPPED | OUTPATIENT
Start: 2018-07-13 | End: 2018-07-27 | Stop reason: ALTCHOICE

## 2018-07-13 RX ORDER — LAMOTRIGINE 25 MG/1
50 TABLET ORAL 2 TIMES DAILY
Qty: 120 TAB | Refills: 1 | Status: SHIPPED | OUTPATIENT
Start: 2018-07-13 | End: 2018-07-27 | Stop reason: SDUPTHER

## 2018-07-13 NOTE — LETTER
NOTIFICATION RETURN TO WORK / SCHOOL 
 
7/13/2018 12:56 PM 
 
Mr. Katharina Smith 
[facility] [address] [id number] To Whom It May Concern: 
 
Katharina Smith is currently under the care of [facility]. He will return to work/school on: Tuesday July 17th 2018 If there are questions or concerns please have the patient contact our office. Sincerely, Clovis Harp NP
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
## 2018-07-13 NOTE — PROGRESS NOTES
Cirilo Herrera  Identified pt with two pt identifiers(name and ). Chief Complaint   Patient presents with    Follow-up     Rm 4:       1. Have you been to the ER, urgent care clinic since your last visit? Hospitalized since your last visit? no    2. Have you seen or consulted any other health care providers outside of the [address] [place] [address] since your last visit? Include any pap smears or colon screening. no    Today's provider has been notified of reason for visit, vitals and flowsheets obtained on patients. Patient received paperwork for advance directive during previous visit but has not completed at this time     Reviewed record In preparation for visit, huddled with provider and have obtained necessary documentation      There are no preventive care reminders to display for this patient.     Wt Readings from Last 3 Encounters:   18 252 lb 9.6 oz (114.6 kg)   18 254 lb 4.8 oz (115.3 kg)   18 252 lb 8 oz (114.5 kg)     Temp Readings from Last 3 Encounters:   18 97.9 °F (36.6 °C) (Temporal)   18 96.6 °F (35.9 °C) (Temporal)   18 98.5 °F (36.9 °C)     BP Readings from Last 3 Encounters:   18 (!) 134/107   18 (!) 140/99   18 145/84     Pulse Readings from Last 3 Encounters:   18 (!) 104   18 81   18 80     Vitals:    18 1221   BP: (!) 134/107   Pulse: (!) 104   Resp: 19   Temp: 97.9 °F (36.6 °C)   TempSrc: Temporal   SpO2: 97%   Weight: 252 lb 9.6 oz (114.6 kg)   Height: 6' 4.5\" (1.943 m)         Learning Assessment:  :     Learning Assessment 2015   PRIMARY LEARNER Patient   PRIMARY LANGUAGE ENGLISH   LEARNER PREFERENCE PRIMARY OTHER (COMMENT)   ANSWERED BY pt   RELATIONSHIP SELF       Depression Screening:  :     PHQ over the last two weeks 2018   Little interest or pleasure in doing things Several days   Feeling down, depressed or hopeless Several days   Total Score PHQ 2 2   Trouble falling or staying asleep, or sleeping too much -   Feeling tired or having little energy -   Poor appetite or overeating -   Feeling bad about yourself - or that you are a failure or have let yourself or your family down -   Trouble concentrating on things such as school, work, reading or watching TV -   Moving or speaking so slowly that other people could have noticed; or the opposite being so fidgety that others notice -   Thoughts of being better off dead, or hurting yourself in some way -   PHQ 9 Score -   How difficult have these problems made it for you to do your work, take care of your home and get along with others -       Fall Risk Assessment:  :     No flowsheet data found. Abuse Screening:  :     No flowsheet data found. ADL Screening:  :     ADL Assessment 6/5/2018   Feeding yourself No Help Needed   Getting from bed to chair No Help Needed   Getting dressed No Help Needed   Bathing or showering No Help Needed   Walk across the room (includes cane/walker) No Help Needed   Using the telphone No Help Needed   Taking your medications No Help Needed   Preparing meals No Help Needed   Managing money (expenses/bills) No Help Needed   Moderately strenuous housework (laundry) No Help Needed   Shopping for personal items (toiletries/medicines) No Help Needed   Shopping for groceries No Help Needed   Driving No Help Needed   Climbing a flight of stairs No Help Needed   Getting to places beyond walking distances No Help Needed                 Medication reconciliation up to date and corrected with patient at this time.
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
## 2018-07-13 NOTE — PATIENT INSTRUCTIONS
Depression Treatment: Care Instructions  Your Care Instructions    Depression is a condition that affects the way you feel, think, and act. It causes symptoms such as low energy, loss of interest in daily activities, and sadness or grouchiness that goes on for a long time. Depression is very common and affects men and women of all ages. Depression is a medical illness caused by changes in the natural chemicals in your brain. It is not a character flaw, and it does not mean that you are a bad or weak person. It does not mean that you are going crazy. It is important to know that depression can be treated. Medicines, counseling, and self-care can all help. Many people do not get help because they are embarrassed or think that they will get over the depression on their own. But some people do not get better without treatment. Follow-up care is a key part of your treatment and safety. Be sure to make and go to all appointments, and call your doctor if you are having problems. It's also a good idea to know your test results and keep a list of the medicines you take. How can you care for yourself at home? Learn about antidepressant medicines  Antidepressant medicines can improve or end the symptoms of depression. You may need to take the medicine for at least 6 months, and often longer. Keep taking your medicine even if you feel better. If you stop taking it too soon, your symptoms may come back or get worse. You may start to feel better within 1 to 3 weeks of taking antidepressant medicine. But it can take as many as 6 to 8 weeks to see more improvement. Talk to your doctor if you have problems with your medicine or if you do not notice any improvement after 3 weeks. Antidepressants can make you feel tired, dizzy, or nervous. Some people have dry mouth, constipation, headaches, sexual problems, an upset stomach, or diarrhea.  Many of these side effects are mild and go away on their own after you take the medicine for a few weeks. Some may last longer. Talk to your doctor if side effects bother you too much. You might be able to try a different medicine. If you are pregnant or breastfeeding, talk to your doctor about what medicines you can take. Learn about counseling  In many cases, counseling can work as well as medicines to treat mild to moderate depression. Counseling is done by licensed mental health providers, such as psychologists, social workers, and some types of nurses. It can be done in one-on-one sessions or in a group setting. Many people find group sessions helpful. Cognitive-behavioral therapy is a type of counseling. In this treatment therapy, you learn how to see and change unhelpful thinking styles that may be adding to your depression. Counseling and medicines often work well when used together. To manage depression  · Be physically active. Getting 30 minutes of exercise each day is good for your body and your mind. Begin slowly if it is hard for you to get started. If you already exercise, keep it up. · Plan something pleasant for yourself every day. Include activities that you have enjoyed in the past.  · Get enough sleep. Talk to your doctor if you have problems sleeping. · Eat a balanced diet. If you do not feel hungry, eat small snacks rather than large meals. · Do not drink alcohol, use illegal drugs, or take medicines that your doctor has not prescribed for you. They may interfere with your treatment. · Spend time with family and friends. It may help to speak openly about your depression with people you trust.  · Take your medicines exactly as prescribed. Call your doctor if you think you are having a problem with your medicine. · Do not make major life decisions while you are depressed. Depression may change the way you think. You will be able to make better decisions after you feel better. · Think positively.  Challenge negative thoughts with statements such as \"I am hopeful\"; \"Things will get better\"; and \"I can ask for the help I need. \" Write down these statements and read them often, even if you don't believe them yet. · Be patient with yourself. It took time for your depression to develop, and it will take time for your symptoms to improve. Do not take on too much or be too hard on yourself. · Learn all you can about depression from written and online materials. · Check out behavioral health classes to learn more about dealing with depression. · Keep the numbers for these national suicide hotlines: 0-397-214-TALK (5-752.728.7482) and 4-533-HESGXAB (3-944.636.6590). If you or someone you know talks about suicide or feeling hopeless, get help right away. When should you call for help? Call 911 anytime you think you may need emergency care. For example, call if:    · You feel you cannot stop from hurting yourself or someone else.   [facility] your doctor now or seek immediate medical care if:    · You hear voices.     · You feel much more depressed.    Watch closely for changes in your health, and be sure to contact your doctor if:    · You are having problems with your depression medicine.     · You are not getting better as expected. Where can you learn more? Go to http://charissa-maria elena.info/. Enter Q610 in the search box to learn more about \"Depression Treatment: Care Instructions. \"  Current as of: December 7, 2017  Content Version: 11.7  © 5131-5098 [employer]. Care instructions adapted under license by [employer] (which disclaims liability or warranty for this information). If you have questions about a medical condition or this instruction, always ask your healthcare professional. [address] any warranty or liability for your use of this information.        High Blood Pressure: Care Instructions  Your Care Instructions    If your blood pressure is usually above 130/80, you have high blood pressure, or hypertension. That means the top number is 130 or higher or the bottom number is 80 or higher, or both. Despite what a lot of people think, high blood pressure usually doesn't cause headaches or make you feel dizzy or lightheaded. It usually has no symptoms. But it does increase your risk for heart attack, stroke, and kidney or eye damage. The higher your blood pressure, the more your risk increases. Your doctor will give you a goal for your blood pressure. Your goal will be based on your health and your age. Lifestyle changes, such as eating healthy and being active, are always important to help lower blood pressure. You might also take medicine to reach your blood pressure goal.  Follow-up care is a key part of your treatment and safety. Be sure to make and go to all appointments, and call your doctor if you are having problems. It's also a good idea to know your test results and keep a list of the medicines you take. How can you care for yourself at home? Medical treatment  · If you stop taking your medicine, your blood pressure will go back up. You may take one or more types of medicine to lower your blood pressure. Be safe with medicines. Take your medicine exactly as prescribed. Call your doctor if you think you are having a problem with your medicine. · Talk to your doctor before you start taking aspirin every day. Aspirin can help certain people lower their risk of a heart attack or stroke. But taking aspirin isn't right for everyone, because it can cause serious bleeding. · See your doctor regularly. You may need to see the doctor more often at first or until your blood pressure comes down. · If you are taking blood pressure medicine, talk to your doctor before you take decongestants or anti-inflammatory medicine, such as ibuprofen. Some of these medicines can raise blood pressure. · Learn how to check your blood pressure at home. Lifestyle changes  · Stay at a healthy weight.  This is especially important if you put on weight around the waist. Losing even 10 pounds can help you lower your blood pressure. · If your doctor recommends it, get more exercise. Walking is a good choice. Bit by bit, increase the amount you walk every day. Try for at least 30 minutes on most days of the week. You also may want to swim, bike, or do other activities. · Avoid or limit alcohol. Talk to your doctor about whether you can drink any alcohol. · Try to limit how much sodium you eat to less than 2,300 milligrams (mg) a day. Your doctor may ask you to try to eat less than 1,500 mg a day. · Eat plenty of fruits (such as bananas and oranges), vegetables, legumes, whole grains, and low-fat dairy products. · Lower the amount of saturated fat in your diet. Saturated fat is found in animal products such as milk, cheese, and meat. Limiting these foods may help you lose weight and also lower your risk for heart disease. · Do not smoke. Smoking increases your risk for heart attack and stroke. If you need help quitting, talk to your doctor about stop-smoking programs and medicines. These can increase your chances of quitting for good. When should you call for help? Call 911 anytime you think you may need emergency care. This may mean having symptoms that suggest that your blood pressure is causing a serious heart or blood vessel problem. Your blood pressure may be over 180/110.   For example, call 911 if:    · You have symptoms of a heart attack. These may include:  ¨ Chest pain or pressure, or a strange feeling in the chest.  ¨ Sweating. ¨ Shortness of breath. ¨ Nausea or vomiting. ¨ Pain, pressure, or a strange feeling in the back, neck, jaw, or upper belly or in one or both shoulders or arms. ¨ Lightheadedness or sudden weakness. ¨ A fast or irregular heartbeat.     · You have symptoms of a stroke.  These may include:  ¨ Sudden numbness, tingling, weakness, or loss of movement in your face, arm, or leg, especially on only one side of your body. ¨ Sudden vision changes. ¨ Sudden trouble speaking. ¨ Sudden confusion or trouble understanding simple statements. ¨ Sudden problems with walking or balance. ¨ A sudden, severe headache that is different from past headaches.     · You have severe back or belly pain.    Do not wait until your blood pressure comes down on its own. Get help right away.   Call your doctor now or seek immediate care if:    · Your blood pressure is much higher than normal (such as 180/110 or higher), but you don't have symptoms.     · You think high blood pressure is causing symptoms, such as:  ¨ Severe headache. ¨ Blurry vision.    Watch closely for changes in your health, and be sure to contact your doctor if:    · Your blood pressure measures 140/90 or higher at least 2 times. That means the top number is 140 or higher or the bottom number is 90 or higher, or both.     · You think you may be having side effects from your blood pressure medicine.     · Your blood pressure is usually normal, but it goes above normal at least 2 times. Where can you learn more? Go to http://charissa-maria elena.info/. Enter X051 in the search box to learn more about \"High Blood Pressure: Care Instructions. \"  Current as of: December 6, 2017  Content Version: 11.7  © 2975-8750 [employer]. Care instructions adapted under license by [employer] (which disclaims liability or warranty for this information). If you have questions about a medical condition or this instruction, always ask your healthcare professional. [place] [address] any warranty or liability for your use of this information.

## 2018-07-27 ENCOUNTER — OFFICE VISIT (OUTPATIENT)
Dept: FAMILY MEDICINE CLINIC | Age: 43
End: 2018-07-27

## 2018-07-27 VITALS
RESPIRATION RATE: 17 BRPM | TEMPERATURE: 98.7 F | DIASTOLIC BLOOD PRESSURE: 89 MMHG | WEIGHT: 252.9 LBS | SYSTOLIC BLOOD PRESSURE: 134 MMHG | HEART RATE: 65 BPM | BODY MASS INDEX: 29.86 KG/M2 | OXYGEN SATURATION: 97 % | HEIGHT: 77 IN

## 2018-07-27 DIAGNOSIS — E78.00 HYPERCHOLESTEROLEMIA: ICD-10-CM

## 2018-07-27 DIAGNOSIS — F32.1 MODERATE MAJOR DEPRESSION (HCC): ICD-10-CM

## 2018-07-27 DIAGNOSIS — E53.8 VITAMIN B12 DEFICIENCY: ICD-10-CM

## 2018-07-27 DIAGNOSIS — E55.9 VITAMIN D DEFICIENCY: ICD-10-CM

## 2018-07-27 DIAGNOSIS — F43.23 ADJUSTMENT REACTION WITH ANXIETY AND DEPRESSION: Primary | ICD-10-CM

## 2018-07-27 DIAGNOSIS — F98.8 ADD (ATTENTION DEFICIT DISORDER) WITHOUT HYPERACTIVITY: ICD-10-CM

## 2018-07-27 DIAGNOSIS — I10 ESSENTIAL HYPERTENSION: ICD-10-CM

## 2018-07-27 DIAGNOSIS — E03.9 HYPOTHYROIDISM, UNSPECIFIED TYPE: ICD-10-CM

## 2018-07-27 PROBLEM — R53.83 FATIGUE: Status: RESOLVED | Noted: 2018-06-29 | Resolved: 2018-07-27

## 2018-07-27 RX ORDER — LAMOTRIGINE 25 MG/1
50 TABLET ORAL 2 TIMES DAILY
Qty: 120 TAB | Refills: 2 | Status: SHIPPED | OUTPATIENT
Start: 2018-07-27 | End: 2018-10-22 | Stop reason: SDUPTHER

## 2018-07-27 RX ORDER — CYANOCOBALAMIN 1000 UG/ML
1000 INJECTION, SOLUTION INTRAMUSCULAR; SUBCUTANEOUS ONCE
Qty: 1 ML | Refills: 0
Start: 2018-07-27 | End: 2018-07-27

## 2018-07-27 NOTE — MR AVS SNAPSHOT
[address] [place] [address] 
[address] Saurabh Winslow [address] 
[phone] Patient: Anuj Bautista MRN: [id number] :1975 Visit Information Date & Time Provider Department Dept. Phone Encounter #  
 2018 10:10 AM Lashanda Crandall NP [facility] [phone] [id number] Follow-up Instructions Return in about 1 week (around 8/3/2018) for Medication Check, XOL, Hypothyroidism, Vit B12. Upcoming Health Maintenance Date Due Pneumococcal 19-64 Highest Risk (1 of 3 - PCV13) 2018* DTaP/Tdap/Td series (1 - Tdap) 2019* Influenza Age 5 to Adult 2018 *Topic was postponed. The date shown is not the original due date. Allergies as of 2018  Review Complete On: 2018 By: Lashanda Crandall NP No Known Allergies Current Immunizations  Reviewed on 2018 No immunizations on file. Reviewed by Regina Ro LPN on  at 78:54 AM  
You Were Diagnosed With   
  
 Codes Comments Adjustment reaction with anxiety and depression    -  Primary ICD-10-CM: F43.23 
ICD-9-CM: 309.28 Moderate major depression (HCC)     ICD-10-CM: F32.1 ICD-9-CM: 296.22 Essential hypertension     ICD-10-CM: I10 
ICD-9-CM: 401.9 Hypothyroidism, unspecified type     ICD-10-CM: E03.9 ICD-9-CM: 389. 9 Vitamin D deficiency     ICD-10-CM: E55.9 ICD-9-CM: 268.9 Hypercholesterolemia     ICD-10-CM: E78.00 ICD-9-CM: 272.0 ADD (attention deficit disorder) without hyperactivity     ICD-10-CM: F98.8 ICD-9-CM: 314.00 Vitamin B12 deficiency     ICD-10-CM: E53.8 ICD-9-CM: 266.2 Vitals BP Pulse Temp Resp Height(growth percentile) 134/89 (BP 1 Location: Right arm, BP Patient Position: Sitting) 65 98.7 °F (37.1 °C) (Temporal) 17 6' 4.5\" (1.943 m) Weight(growth percentile) SpO2 BMI Smoking Status 252 lb 14.4 oz (114.7 kg) 97% 30.38 kg/m2 Former Smoker BMI and BSA Data Body Mass Index Body Surface Area  
 30.38 kg/m 2 2.49 m 2 Preferred Pharmacy Pharmacy Name Phone [facility] [address] AT [address] [phone] Your Updated Medication List  
  
   
This list is accurate as of 7/27/18 11:12 AM.  Always use your most recent med list.  
  
  
  
  
 amphetamine-dextroamphetamine XR 30 mg XR capsule Commonly known as:  ADDERALL XR Take 1 Cap (30 mg total) by mouth every morning. Max Daily Amount: 30 mg  
  
 atorvastatin 20 mg tablet Commonly known as:  LIPITOR Take 1 Tab by mouth daily. buPROPion  mg SR tablet Commonly known as:  Garza Mccreedy Take 1 Tab by mouth two (2) times a day. cyanocobalamin 1,000 mcg/mL injection Commonly known as:  VITAMIN B-12  
1 mL by IntraMUSCular route once for 1 dose. ergocalciferol 50,000 unit capsule Commonly known as:  ERGOCALCIFEROL Take 1 Cap by mouth every seven (7) days. lamoTRIgine 25 mg tablet Commonly known as: LaMICtal  
Take 2 Tabs by mouth two (2) times a day. levothyroxine 25 mcg tablet Commonly known as:  SYNTHROID Take 1 Tab by mouth Daily (before breakfast). multivitamin tablet Commonly known as:  ONE A DAY Take 1 Tab by mouth daily. Prescriptions Sent to Pharmacy Refills  
 lamoTRIgine (LAMICTAL) 25 mg tablet 2 Sig: Take 2 Tabs by mouth two (2) times a day. Class: Normal  
 Pharmacy: [facility] AT [place] [address] Ph #: [phone] Route: Oral  
  
We Performed the Following LIPID PANEL [03518 CPT(R)] THER/PROPH/DIAG INJECTION, SUBCUT/IM [id number] CPT(R)] TSH 3RD GENERATION [63964 CPT(R)] VITAMIN B12 INJECTION [ [facility]] VITAMIN B12 [id number] CPT(R)] VITAMIN D, 25 HYDROXY [id number] CPT(R)] Follow-up Instructions Return in about 1 week (around 8/3/2018) for Medication Check, XOL, Hypothyroidism, Vit B12. Patient Instructions Adjustment Disorder: Care Instructions Your Care Instructions Adjustment disorder means that you have emotional or behavioral problems because of stress. But your response to the stress is far more severe than a normal response. It is severe enough to affect your work or social life and may cause depression and physical pains and problems. Events that may cause this response can include a divorce, money problems, or starting school or a new job. It might be anything that causes some stress. This disorder is most often a short-term problem. It happens within 3 months of the stressful event or change. If the response lasts longer than 6 months after the event ends, you may have a more serious disorder. Follow-up care is a key part of your treatment and safety. Be sure to make and go to all appointments, and call your doctor if you are having problems. It's also a good idea to know your test results and keep a list of the medicines you take. How can you care for yourself at home? · Go to all counseling sessions. Do not skip any because you are feeling better. · If your doctor prescribed medicines, take them exactly as prescribed. Call your doctor if you think you are having a problem with your medicine. You will get more details on the specific medicines your doctor prescribes. · Discuss the causes of your stress with a good friend or family member. Or you can join a support group for people with similar problems. Talking to others sometimes relieves stress. · Get at least 30 minutes of exercise on most days of the week. Walking is a good choice. You also may want to do other activities, such as running, swimming, cycling, or playing tennis or team sports. Relaxation techniques Do relaxation exercises 10 to 20 minutes a day.  You can play soothing, relaxing music while you do them, if you wish. · Tell others in your house that you are going to do your relaxation exercises. Ask them not to disturb you. · Find a comfortable, quiet place. · Lie down on your back, or sit with your back straight. · Focus on your breathing. Make it slow and steady. · Breathe in through your nose. Breathe out through either your nose or mouth. · Breathe deeply, filling up the area between your navel and your rib cage. Breathe so that your belly goes up and down. · Do not hold your breath. · Breathe like this for 5 to 10 minutes. Notice the feeling of calmness throughout your whole body. As you continue to breathe slowly and deeply, relax by doing these next steps for another 5 to 10 minutes: · Tighten and relax each muscle group in your body. Start at your toes, and work your way up to your head. · Imagine your muscle groups relaxing and getting heavy. · Empty your mind of all thoughts. · Let yourself relax more and more deeply. · Be aware of the state of calmness that surrounds you. · When your relaxation time is over, you can bring yourself back to alertness by moving your fingers and toes. Then move your hands and feet. And then move your entire body. Sometimes people fall asleep during relaxation. But they most often wake up soon. · Always give yourself time to return to full alertness before you drive a car. Wait to do anything that might cause an accident if you are not fully alert. Never play a relaxation tape while you drive a car. When should you call for help? Call 911 anytime you think you may need emergency care. For example, call if: 
  · You feel you cannot stop from hurting yourself or someone else. Keep the numbers for these national suicide hotlines: 1-844-914-TALK (3-595.546.9824) and 5-442-LJQCNIO (7-477.129.2738). If you or someone you know talks about suicide or feeling hopeless, get help right away.  Watch closely for changes in your health, and be sure to contact your doctor if: 
  · You have new anxiety, or your anxiety gets worse.  
  · You have been feeling sad, depressed, or hopeless or have lost interest in things that you usually enjoy.  
  · You do not get better as expected. Where can you learn more? Go to http://charissa-maria elena.info/. Enter [id number] in the search box to learn more about \"Adjustment Disorder: Care Instructions. \" Current as of: October 10, 2017 Content Version: 11.7 © 4808-1359 [employer]. Care instructions adapted under license by [employer] (which disclaims liability or warranty for this information). If you have questions about a medical condition or this instruction, always ask your healthcare professional. [address] any warranty or liability for your use of this information. Hypothyroidism: Care Instructions Your Care Instructions You have hypothyroidism, which means that your body is not making enough thyroid hormone. This hormone helps your body use energy. If your thyroid level is low, you may feel tired, be constipated, have an increase in your blood pressure, or have dry skin or memory problems. You may also get cold easily, even when it is warm. Women with low thyroid levels may have heavy menstrual periods. A blood test to find your thyroid-stimulating hormone (TSH) level is used to check for hypothyroidism. A high TSH level may mean that you have low thyroid. When your body is not making enough thyroid hormone, TSH levels rise in an effort to make the body produce more. The treatment for hypothyroidism is to take thyroid hormone pills. You should start to feel better in 1 to 2 weeks. But it can take several months to see changes in the TSH level. You will need regular visits with your doctor to make sure you have the right dose of medicine. Most people need treatment for the rest of their lives. You will need to see your doctor regularly to have blood tests and to make sure you are doing well. Follow-up care is a key part of your treatment and safety. Be sure to make and go to all appointments, and call your doctor if you are having problems. It's also a good idea to know your test results and keep a list of the medicines you take. How can you care for yourself at home? · Take your thyroid hormone medicine exactly as prescribed. Call your doctor if you think you are having a problem with your medicine. Most people do not have side effects if they take the right amount of medicine regularly. ¨ Take the medicine 30 minutes before breakfast, and do not take it with calcium, vitamins, or iron. ¨ Do not take extra doses of your thyroid medicine. It will not help you get better any faster, and it may cause side effects. ¨ If you forget to take a dose, do NOT take a double dose of medicine. Take your usual dose the next day. · Tell your doctor about all prescription, herbal, or over-the-counter products you take. · Take care of yourself. Eat a healthy diet, get enough sleep, and get regular exercise. When should you call for help? Call 911 anytime you think you may need emergency care. For example, call if: 
  · You passed out (lost consciousness).  
  · You have severe trouble breathing.  
  · You have a very slow heartbeat (less than 60 beats a minute).  
  · You have a low body temperature (95°F or below).  
 Call your doctor now or seek immediate medical care if: 
  · You feel tired, sluggish, or weak.  
  · You have trouble remembering things or concentrating.  
  · You do not begin to feel better 2 weeks after starting your medicine.  
 Watch closely for changes in your health, and be sure to contact your doctor if you have any problems. Where can you learn more? Go to http://charissa-maria elena.info/. Enter Y734 in the search box to learn more about \"Hypothyroidism: Care Instructions. \" Current as of: May 12, 2017 Content Version: 11.7 © 9134-5438 [employer]. Care instructions adapted under license by [employer] (which disclaims liability or warranty for this information). If you have questions about a medical condition or this instruction, always ask your healthcare professional. [address] any warranty or liability for your use of this information. Introducing [facility]! [employer] introduces [employer] patient portal. Now you can access parts of your medical record, email your doctor's office, and request medication refills online. 1. In your internet browser, go to [email or web address] 2. Click on the First Time User? Click Here link in the Sign In box. You will see the New Member Sign Up page. 3. Enter your [employer] Access Code exactly as it appears below. You will not need to use this code after youve completed the sign-up process. If you do not sign up before the expiration date, you must request a new code. · [employer] Access Code: [email or web address] Expires: 9/10/2018  4:04 PM 
 
4. Enter the last four digits of your Social Security Number (xxxx) and Date of Birth (mm/dd/yyyy) as indicated and click Submit. You will be taken to the next sign-up page. 5. Create a [employer] ID. This will be your [employer] login ID and cannot be changed, so think of one that is secure and easy to remember. 6. Create a [employer] password. You can change your password at any time. 7. Enter your Password Reset Question and Answer. This can be used at a later time if you forget your password. 8. Enter your e-mail address. You will receive e-mail notification when new information is available in [address] 9. Click Sign Up. You can now view and download portions of your medical record. 10. Click the Download Summary menu link to download a portable copy of your medical information. If you have questions, please visit the Frequently Asked Questions section of the [employer] website. Remember, [employer] is NOT to be used for urgent needs. For medical emergencies, dial 911. Now available from your iPhone and Android! Please provide this summary of care documentation to your next provider. Your primary care clinician is listed as Verenice Augstin Ala. If you have any questions after today's visit, please call [phone].

## 2018-07-27 NOTE — PROGRESS NOTES
Leydisrinivasa Leela  Identified pt with two pt identifiers(name and ). Chief Complaint   Patient presents with    Labs     fasting     Medication Evaluation       1. Have you been to the ER, urgent care clinic since your last visit? Hospitalized since your last visit? no    2. Have you seen or consulted any other health care providers outside of the [facility] since your last visit? Include any pap smears or colon screening. no    Today's provider has been notified of reason for visit, vitals and flowsheets obtained on patients. Patient received paperwork for advance directive during previous visit but has not completed at this time     Reviewed record In preparation for visit, huddled with provider and have obtained necessary documentation      There are no preventive care reminders to display for this patient.     Wt Readings from Last 3 Encounters:   18 252 lb 14.4 oz (114.7 kg)   18 252 lb 9.6 oz (114.6 kg)   18 254 lb 4.8 oz (115.3 kg)     Temp Readings from Last 3 Encounters:   18 98.7 °F (37.1 °C) (Temporal)   18 97.9 °F (36.6 °C) (Temporal)   18 96.6 °F (35.9 °C) (Temporal)     BP Readings from Last 3 Encounters:   18 134/89   18 (!) 134/107   18 (!) 140/99     Pulse Readings from Last 3 Encounters:   18 65   18 (!) 104   18 81     Vitals:    18 1014   BP: 134/89   Pulse: 65   Resp: 17   Temp: 98.7 °F (37.1 °C)   TempSrc: Temporal   SpO2: 97%   Weight: 252 lb 14.4 oz (114.7 kg)   Height: 6' 4.5\" (1.943 m)   PainSc:   0 - No pain         Learning Assessment:  :     Learning Assessment 2015   PRIMARY LEARNER Patient   PRIMARY LANGUAGE ENGLISH   LEARNER PREFERENCE PRIMARY OTHER (COMMENT)   ANSWERED BY pt   RELATIONSHIP SELF       Depression Screening:  :     PHQ over the last two weeks 2018   Little interest or pleasure in doing things Several days   Feeling down, depressed, irritable, or hopeless Several days   Total Score PHQ 2 2   Trouble falling or staying asleep, or sleeping too much -   Feeling tired or having little energy -   Poor appetite, weight loss, or overeating -   Feeling bad about yourself - or that you are a failure or have let yourself or your family down -   Trouble concentrating on things such as school, work, reading, or watching TV -   Moving or speaking so slowly that other people could have noticed; or the opposite being so fidgety that others notice -   Thoughts of being better off dead, or hurting yourself in some way -   PHQ 9 Score -   How difficult have these problems made it for you to do your work, take care of your home and get along with others -       Fall Risk Assessment:  :     No flowsheet data found. Abuse Screening:  :     No flowsheet data found. ADL Screening:  :     ADL Assessment 6/5/2018   Feeding yourself No Help Needed   Getting from bed to chair No Help Needed   Getting dressed No Help Needed   Bathing or showering No Help Needed   Walk across the room (includes cane/walker) No Help Needed   Using the telphone No Help Needed   Taking your medications No Help Needed   Preparing meals No Help Needed   Managing money (expenses/bills) No Help Needed   Moderately strenuous housework (laundry) No Help Needed   Shopping for personal items (toiletries/medicines) No Help Needed   Shopping for groceries No Help Needed   Driving No Help Needed   Climbing a flight of stairs No Help Needed   Getting to places beyond walking distances No Help Needed                 Medication reconciliation up to date and corrected with patient at this time.

## 2018-07-27 NOTE — PROGRESS NOTES
Chief Complaint   Patient presents with    Labs     fasting     Medication Evaluation     HPI:  The patient is a 40 y.o. male who presents today for a follow up appointment. No hospital, ER or specialist visits since last primary care visit except as noted below. ADD:  Pt restarted on Adderall at his last visit. He reports his ADD symptoms are well controlled at this time. Depression:  He has been taking Wellbutrin BID with great improvement. He does report fatigue, states he previous PCP gave him a Vit B12 shot with good effectiveness. He reports considerably worsening depression this week, he reports 2-3 episodes of SI, he denies a plan and felt \"it would be ok if he did not wake up tomorrow\". At his last visit on 6/29/2018, he was started on Lamictal 25mg daily. He has now increased to 50mg daily and  reports his depressive symptoms are continuing to improve. He ran out of his Lamictal for approximately 1 week. He feels the Wellbutrin and Lamictal are managing his symptoms well. HTN:  He stopped taking Lisinopril and was normotensive at his last visit on 6/29/2018. However he is still not taking his medication but his blood pressure has increased. He denies CP/SOB/palpitations, HECTOR, and HA. He did not restart his Lisinopril and is normotensive in the office today. BP Readings from Last 3 Encounters:   07/27/18 134/89   07/13/18 (!) 134/107   07/09/18 (!) 140/99      Labs: most recent labs, started Lipitor for XOL, continued Vit D for Vit D deficiency, started synthroid 25mcg for elevated TSH. We will recheck your lipids, Vit D, Vit B12, and TSH today in the office. Burns:  Pt has burns on both hands from working on his car. He reports he has not used anything to treat these burns to date. He was using band aids intermittently. He never tried the Silvadene. His burns are almost healed at this point. Vitamin B12:  He has historically had Vitamin B12 deficiency.   He will need a monthly Vitamin B12 injection at this visit today. Review of Systems  A comprehensive review of systems was negative except for that written in the HPI.     Patient Active Problem List   Diagnosis Code    Adjustment reaction with anxiety and depression F43.23    Vitamin D deficiency E55.9    Hypercholesterolemia E78.00    Hypertension I10    GERD (gastroesophageal reflux disease) K21.9    ADD (attention deficit disorder) without hyperactivity F98.8    Moderate major depression ([address] F32.1    Vitamin B12 deficiency E53.8    Hypothyroidism E03.9       Past Medical History:   Diagnosis Date    ADD (attention deficit disorder) without hyperactivity 6/7/2018    Adjustment reaction with anxiety and depression     Attention deficit hyperactivity disorder (ADHD), predominantly inattentive type 6/7/2018    [facility]     testicular     [facility] 2010    Testicular    Community acquired pneumonia     Depression     GERD (gastroesophageal reflux disease)     Headache     Hypercholesterolemia     Hypertension     Vitamin D deficiency        Past Surgical History:   Procedure Laterality Date    HX ORCHIECTOMY Right 2010    HX UROLOGICAL      removed on e testicle    HX WISDOM TEETH EXTRACTION  1996       Social History   Substance Use Topics    Smoking status: Former Smoker     Packs/day: 1.00     Years: 8.00     Types: Cigarettes     Quit date: 2008    Smokeless tobacco: Never Used    Alcohol use 6.0 oz/week     24 Cans of beer per week       Family History   Problem Relation Age of Onset    Cancer Mother      Ovarian    Hypertension Mother     Cancer Father      Throat    Hypertension Father     Arrhythmia Brother      Afib    No Known Problems Maternal Grandmother     No Known Problems Maternal Grandfather     No Known Problems Paternal Grandmother     No Known Problems Paternal Grandfather      No Known Allergies    PE:  Visit Vitals    /89 (BP 1 Location: Right arm, BP Patient Position: Sitting)    Pulse 65    Temp 98.7 °F (37.1 °C) (Temporal)    Resp 17    Ht 6' 4.5\" (1.943 m)    Wt 252 lb 14.4 oz (114.7 kg)    SpO2 97%    BMI 30.38 kg/m2     Gen: alert, oriented, no acute distress  Head: normocephalic, atraumatic  Ears: external auditory canals clear, TMs without erythema or effusion  Eyes: pupils equal round reactive to light, sclera clear, conjunctiva clear  Oral: moist mucus membranes, no oral lesions, no pharyngeal inflammation or exudate  Neck: symmetric normal sized thyroid, no carotid bruits, no jugular vein distention  Resp: no increase work of breathing, lungs clear to ausculation bilaterally, no wheezing, rales or rhonchi  CV: S1, S2 normal.  No murmurs, rubs, or gallops. Abd: soft, not tender, not distended. No hepatosplenomegaly. Normal bowel sounds. No hernias. No abdominal or renal bruits. Neuro: cranial nerves intact, normal strength and movement in all extremities, reflexes and sensation intact and symmetric. Skin: no lesion or rash  Extremities: no cyanosis or edema    Assessment/Plan:    ICD-10-CM ICD-9-CM    1. Adjustment reaction with anxiety and depression F43.23 309.28 lamoTRIgine (LAMICTAL) 25 mg tablet   2. Moderate major depression (HCC) F32.1 296.22    3. Essential hypertension I10 401.9    4. Hypothyroidism, unspecified type E03.9 244.9 TSH 3RD GENERATION   5. Vitamin D deficiency E55.9 268.9 VITAMIN D, 25 HYDROXY   6. Hypercholesterolemia E78.00 272.0 LIPID PANEL   7. ADD (attention deficit disorder) without hyperactivity F98.8 314.00    8.  Vitamin B12 deficiency E53.8 266.2 VITAMIN B12      VITAMIN B12 INJECTION      THER/PROPH/DIAG INJECTION, SUBCUT/IM      cyanocobalamin (VITAMIN B-12) 1,000 mcg/mL injection     Follow-up Disposition:  Return in about 1 week (around 8/3/2018) for Medication Check, XOL, Hypothyroidism, Vit B12.  lab results and schedule of future lab studies reviewed with patient  reviewed diet, exercise and weight control  cardiovascular risk and specific lipid/LDL goals reviewed  reviewed medications and side effects in detail    Health Maintenance reviewed - UTD. Recommended healthy diet low in carbohydrates, fats, sodium and cholesterol. Recommended regular cardiovascular exercise 3-6 times per week for 30-60 minutes daily. Chart is reviewed and updated today in the office. Records requested for other providers patient has seen and is currently seeing. Patient was offered a choice/choices in the treatment plan today. Patient expresses understanding of the plan and agrees with recommendations. Verbal and written instructions (see AVS) provided. See patient instructions for more. Patient expresses understanding of diagnosis and treatment plan.

## 2018-07-27 NOTE — PATIENT INSTRUCTIONS
Adjustment Disorder: Care Instructions  Your Care Instructions    Adjustment disorder means that you have emotional or behavioral problems because of stress. But your response to the stress is far more severe than a normal response. It is severe enough to affect your work or social life and may cause depression and physical pains and problems. Events that may cause this response can include a divorce, money problems, or starting school or a new job. It might be anything that causes some stress. This disorder is most often a short-term problem. It happens within 3 months of the stressful event or change. If the response lasts longer than 6 months after the event ends, you may have a more serious disorder. Follow-up care is a key part of your treatment and safety. Be sure to make and go to all appointments, and call your doctor if you are having problems. It's also a good idea to know your test results and keep a list of the medicines you take. How can you care for yourself at home? · Go to all counseling sessions. Do not skip any because you are feeling better. · If your doctor prescribed medicines, take them exactly as prescribed. Call your doctor if you think you are having a problem with your medicine. You will get more details on the specific medicines your doctor prescribes. · Discuss the causes of your stress with a good friend or family member. Or you can join a support group for people with similar problems. Talking to others sometimes relieves stress. · Get at least 30 minutes of exercise on most days of the week. Walking is a good choice. You also may want to do other activities, such as running, swimming, cycling, or playing tennis or team sports. Relaxation techniques  Do relaxation exercises 10 to 20 minutes a day. You can play soothing, relaxing music while you do them, if you wish. · Tell others in your house that you are going to do your relaxation exercises.  Ask them not to disturb you.  · Find a comfortable, quiet place. · Lie down on your back, or sit with your back straight. · Focus on your breathing. Make it slow and steady. · Breathe in through your nose. Breathe out through either your nose or mouth. · Breathe deeply, filling up the area between your navel and your rib cage. Breathe so that your belly goes up and down. · Do not hold your breath. · Breathe like this for 5 to 10 minutes. Notice the feeling of calmness throughout your whole body. As you continue to breathe slowly and deeply, relax by doing these next steps for another 5 to 10 minutes:  · Tighten and relax each muscle group in your body. Start at your toes, and work your way up to your head. · Imagine your muscle groups relaxing and getting heavy. · Empty your mind of all thoughts. · Let yourself relax more and more deeply. · Be aware of the state of calmness that surrounds you. · When your relaxation time is over, you can bring yourself back to alertness by moving your fingers and toes. Then move your hands and feet. And then move your entire body. Sometimes people fall asleep during relaxation. But they most often wake up soon. · Always give yourself time to return to full alertness before you drive a car. Wait to do anything that might cause an accident if you are not fully alert. Never play a relaxation tape while you drive a car. When should you call for help? Call 911 anytime you think you may need emergency care. For example, call if:    · You feel you cannot stop from hurting yourself or someone else. Keep the numbers for these national suicide hotlines: 0-140-450-TALK (5-105-061-987-890-5145) and 9-700-AZTNUIF (1-385.559.6340).  If you or someone you know talks about suicide or feeling hopeless, get help right away.    Watch closely for changes in your health, and be sure to contact your doctor if:    · You have new anxiety, or your anxiety gets worse.     · You have been feeling sad, depressed, or hopeless or have lost interest in things that you usually enjoy.     · You do not get better as expected. Where can you learn more? Go to http://charissa-maria elena.info/. Enter 0688 [id number] in the search box to learn more about \"Adjustment Disorder: Care Instructions. \"  Current as of: October 10, 2017  Content Version: 11.7  © 1053-3965 [employer]. Care instructions adapted under license by [employer] (which disclaims liability or warranty for this information). If you have questions about a medical condition or this instruction, always ask your healthcare professional. [place] [address] any warranty or liability for your use of this information. Hypothyroidism: Care Instructions  Your Care Instructions    You have hypothyroidism, which means that your body is not making enough thyroid hormone. This hormone helps your body use energy. If your thyroid level is low, you may feel tired, be constipated, have an increase in your blood pressure, or have dry skin or memory problems. You may also get cold easily, even when it is warm. Women with low thyroid levels may have heavy menstrual periods. A blood test to find your thyroid-stimulating hormone (TSH) level is used to check for hypothyroidism. A high TSH level may mean that you have low thyroid. When your body is not making enough thyroid hormone, TSH levels rise in an effort to make the body produce more. The treatment for hypothyroidism is to take thyroid hormone pills. You should start to feel better in 1 to 2 weeks. But it can take several months to see changes in the TSH level. You will need regular visits with your doctor to make sure you have the right dose of medicine. Most people need treatment for the rest of their lives. You will need to see your doctor regularly to have blood tests and to make sure you are doing well. Follow-up care is a key part of your treatment and safety.  Be sure to make and go to all appointments, and call your doctor if you are having problems. It's also a good idea to know your test results and keep a list of the medicines you take. How can you care for yourself at home? · Take your thyroid hormone medicine exactly as prescribed. Call your doctor if you think you are having a problem with your medicine. Most people do not have side effects if they take the right amount of medicine regularly. ¨ Take the medicine 30 minutes before breakfast, and do not take it with calcium, vitamins, or iron. ¨ Do not take extra doses of your thyroid medicine. It will not help you get better any faster, and it may cause side effects. ¨ If you forget to take a dose, do NOT take a double dose of medicine. Take your usual dose the next day. · Tell your doctor about all prescription, herbal, or over-the-counter products you take. · Take care of yourself. Eat a healthy diet, get enough sleep, and get regular exercise. When should you call for help? Call 911 anytime you think you may need emergency care. For example, call if:    · You passed out (lost consciousness).     · You have severe trouble breathing.     · You have a very slow heartbeat (less than 60 beats a minute).     · You have a low body temperature (95°F or below).    Call your doctor now or seek immediate medical care if:    · You feel tired, sluggish, or weak.     · You have trouble remembering things or concentrating.     · You do not begin to feel better 2 weeks after starting your medicine.    Watch closely for changes in your health, and be sure to contact your doctor if you have any problems. Where can you learn more? Go to http://charissa-maria elena.info/. Enter M379 in the search box to learn more about \"Hypothyroidism: Care Instructions. \"  Current as of: May 12, 2017  Content Version: 11.7  © 8242-8902 [employer].  Care instructions adapted under license by [employer] (which disclaims liability or warranty for this information). If you have questions about a medical condition or this instruction, always ask your healthcare professional. [place] [address] any warranty or liability for your use of this information.

## 2018-07-28 LAB
25(OH)D3+25(OH)D2 SERPL-MCNC: 30.9 NG/ML (ref 30–100)
CHOLEST SERPL-MCNC: 164 MG/DL (ref 100–199)
HDLC SERPL-MCNC: 50 MG/DL
INTERPRETATION, 910389: NORMAL
LDLC SERPL CALC-MCNC: 85 MG/DL (ref 0–99)
TRIGL SERPL-MCNC: 146 MG/DL (ref 0–149)
TSH SERPL DL<=0.005 MIU/L-ACNC: 3.3 UIU/ML (ref 0.45–4.5)
VIT B12 SERPL-MCNC: 1095 PG/ML (ref 232–1245)
VLDLC SERPL CALC-MCNC: 29 MG/DL (ref 5–40)

## 2018-07-30 RX ORDER — LEVOTHYROXINE SODIUM 50 UG/1
50 TABLET ORAL
Qty: 30 TAB | Refills: 1 | Status: SHIPPED | OUTPATIENT
Start: 2018-07-30 | End: 2018-08-24 | Stop reason: SDUPTHER

## 2018-07-30 RX ORDER — ERGOCALCIFEROL 1.25 MG/1
50000 CAPSULE ORAL
Qty: 5 CAP | Refills: 2 | Status: SHIPPED | OUTPATIENT
Start: 2018-07-30 | End: 2018-08-24 | Stop reason: SDUPTHER

## 2018-07-30 NOTE — PROGRESS NOTES
Your lab results have been reviewed and are as follows: Thyroid:  Your thyroid function is better but still abnormal.  We will increase your levothyroxine to 50mcg daily. I have escribed this to your pharmacy on file. We will recheck this in one month. Cholesterol Panel: Excellent!!  Total Cholesterol is 164 (goal <200). Triglycerides are 146. (goal <150)  Your HDL or \"good\" cholesterol is 50. (goal >40). Your LDL or \"bad\" cholesterol is 85.  (goal <100)    Vitamin D:  Your vitamin D is low at 30.9. This is an important nutrient bone health and to fight inflammation and infection. We will start a prescription to increase your Vitamin D level for the next 3 months. You will take 50,000 international units Vitamin D2 in the form of one tab taken once weekly, I have sent this prescription to your pharmacy on file. After you complete the prescription, start taking OTC Vitamin D3 2,000 international units daily. After three months we will recheck your levels. A normal value is between 40-50 on average. The symptoms of vitamin D deficiency are sometimes vague and can include tiredness and general aches and pains. Some people may not have any symptoms at all. Vitamin D also fights infections, including colds and the flu, as it regulates the expression of genes that influence your immune system to attack and destroy bacteria and viruses. Vitamin B12:  Your Vitamin B12 level is very good. Divya Gutierrez Edd, MSN, MHA, FNP-BC

## 2018-08-01 ENCOUNTER — OFFICE VISIT (OUTPATIENT)
Dept: FAMILY MEDICINE CLINIC | Age: 43
End: 2018-08-01

## 2018-08-01 VITALS
HEIGHT: 77 IN | DIASTOLIC BLOOD PRESSURE: 95 MMHG | OXYGEN SATURATION: 99 % | HEART RATE: 81 BPM | WEIGHT: 252.8 LBS | SYSTOLIC BLOOD PRESSURE: 145 MMHG | TEMPERATURE: 98.3 F | RESPIRATION RATE: 18 BRPM | BODY MASS INDEX: 29.85 KG/M2

## 2018-08-01 DIAGNOSIS — E78.00 HYPERCHOLESTEROLEMIA: ICD-10-CM

## 2018-08-01 DIAGNOSIS — Z20.2 EXPOSURE TO SEXUALLY TRANSMITTED DISEASE (STD): ICD-10-CM

## 2018-08-01 DIAGNOSIS — F98.8 ADD (ATTENTION DEFICIT DISORDER) WITHOUT HYPERACTIVITY: ICD-10-CM

## 2018-08-01 DIAGNOSIS — F43.23 ADJUSTMENT REACTION WITH ANXIETY AND DEPRESSION: Primary | ICD-10-CM

## 2018-08-01 DIAGNOSIS — E53.8 VITAMIN B12 DEFICIENCY: ICD-10-CM

## 2018-08-01 DIAGNOSIS — I10 ESSENTIAL HYPERTENSION: ICD-10-CM

## 2018-08-01 DIAGNOSIS — E03.9 HYPOTHYROIDISM, UNSPECIFIED TYPE: ICD-10-CM

## 2018-08-01 RX ORDER — DEXTROAMPHETAMINE SACCHARATE, AMPHETAMINE ASPARTATE MONOHYDRATE, DEXTROAMPHETAMINE SULFATE AND AMPHETAMINE SULFATE 7.5; 7.5; 7.5; 7.5 MG/1; MG/1; MG/1; MG/1
30 CAPSULE, EXTENDED RELEASE ORAL
Qty: 30 CAP | Refills: 0 | Status: CANCELLED | OUTPATIENT
Start: 2018-08-01

## 2018-08-01 RX ORDER — BUPROPION HYDROCHLORIDE 150 MG/1
150 TABLET, EXTENDED RELEASE ORAL 2 TIMES DAILY
Qty: 60 TAB | Refills: 0 | Status: SHIPPED | OUTPATIENT
Start: 2018-08-01 | End: 2018-10-03 | Stop reason: SDUPTHER

## 2018-08-01 RX ORDER — CYANOCOBALAMIN 1000 UG/ML
1000 INJECTION, SOLUTION INTRAMUSCULAR; SUBCUTANEOUS
Qty: 3 VIAL | Refills: 1 | Status: SHIPPED | OUTPATIENT
Start: 2018-08-01 | End: 2018-10-22 | Stop reason: SDUPTHER

## 2018-08-01 NOTE — MR AVS SNAPSHOT
Marielena Rojas 
 
 
 [address] [facility] 
[address] [place] [address] 
[phone] Patient: Jerrod Jiang MRN: [id number] :1975 Visit Information Date & Time Provider Department Dept. Phone Encounter #  
 2018  4:30 PM Miley Mccann NP [facility] [phone] [id number] Follow-up Instructions Return in about 4 weeks (around 2018). Upcoming Health Maintenance Date Due Influenza Age 5 to Adult 2018* Pneumococcal 19-64 Highest Risk (1 of 3 - PCV13) 2018* DTaP/Tdap/Td series (1 - Tdap) 2019* *Topic was postponed. The date shown is not the original due date. Allergies as of 2018  Review Complete On: 2018 By: Miley Mccann NP No Known Allergies Current Immunizations  Reviewed on 2018 No immunizations on file. Reviewed by Alessandra Paige LPN on 7003 at  2:62 PM  
You Were Diagnosed With   
  
 Codes Comments Adjustment reaction with anxiety and depression    -  Primary ICD-10-CM: F43.23 
ICD-9-CM: 309.28   
 ADD (attention deficit disorder) without hyperactivity     ICD-10-CM: F98.8 ICD-9-CM: 314.00 Exposure to sexually transmitted disease (STD)     ICD-10-CM: Z20.2 ICD-9-CM: V01.6 Vitamin B12 deficiency     ICD-10-CM: E53.8 ICD-9-CM: 266.2 Hypercholesterolemia     ICD-10-CM: E78.00 ICD-9-CM: 272.0 Hypothyroidism, unspecified type     ICD-10-CM: E03.9 ICD-9-CM: 244.9 Essential hypertension     ICD-10-CM: I10 
ICD-9-CM: 401.9 Vitals BP Pulse Temp Resp Height(growth percentile) (!) 145/95 (BP 1 Location: Left arm, BP Patient Position: Sitting) 81 98.3 °F (36.8 °C) (Temporal) 18 6' 4.5\" (1.943 m) Weight(growth percentile) SpO2 BMI Smoking Status 252 lb 12.8 oz (114.7 kg) 99% 30.37 kg/m2 Former Smoker BMI and BSA Data  Body Mass Index Body Surface Area  
 30.37 kg/m 2 2.49 m 2  
  
  
 Preferred Pharmacy Pharmacy Name Phone [facility] [place], [place] - [address] [place] [address] Rd AT [address] [phone] Your Updated Medication List  
  
   
This list is accurate as of 18  5:39 PM.  Always use your most recent med list.  
  
  
  
  
 atorvastatin 20 mg tablet Commonly known as:  LIPITOR Take 1 Tab by mouth daily. buPROPion  mg SR tablet Commonly known as:  Hanane Caleb Take 1 Tab by mouth two (2) times a day. cyanocobalamin 1,000 mcg/mL injection Commonly known as:  VITAMIN B12  
1 mL by IntraMUSCular route every thirty (30) days. ergocalciferol 50,000 unit capsule Commonly known as:  ERGOCALCIFEROL Take 1 Cap by mouth every seven (7) days. lamoTRIgine 25 mg tablet Commonly known as: LaMICtal  
Take 2 Tabs by mouth two (2) times a day. levothyroxine 50 mcg tablet Commonly known as:  SYNTHROID Take 1 Tab by mouth Daily (before breakfast). lisdexamfetamine 30 mg capsule Commonly known as:  VYVANSE Take 1 Cap (30 mg total) by mouth every morning. Max Daily Amount: 30 mg  
  
 multivitamin tablet Commonly known as:  ONE A DAY Take 1 Tab by mouth daily. Syringe with Needle, Safety 3 mL 25 gauge x 5/8\" Syrg Commonly known as:  BD INTEGRA SYRINGE Use as directed monthly Prescriptions Printed Refills  
 lisdexamfetamine (VYVANSE) 30 mg capsule 0 Sig: Take 1 Cap (30 mg total) by mouth every morning. Max Daily Amount: 30 mg  
 Class: Print Route: Oral  
  
Prescriptions Sent to Pharmacy Refills buPROPion SR (WELLBUTRIN SR) 150 mg SR tablet 0 Sig: Take 1 Tab by mouth two (2) times a day. Class: Normal  
 Pharmacy: [facility] AT [address] Ph #: [phone] Route: Oral  
 Syringe with Needle, Safety (BD INTEGRA SYRINGE) 3 mL 25 gauge x 5/8\" syrg 1 Sig: Use as directed monthly Class: Normal  
 Pharmacy: [facility] [address] AT [place] [address] Ph #: [phone]  
 cyanocobalamin (VITAMIN B12) 1,000 mcg/mL injection 1 Si mL by IntraMUSCular route every thirty (30) days. Class: Normal  
 Pharmacy: [facility] [address] AT [place] [address] Ph #: [phone] Route: IntraMUSCular Follow-up Instructions Return in about 4 weeks (around 2018). To-Do List   
 Around 2018 Point of Care Testing:  AMB POC URINALYSIS DIP STICK AUTO W/ MICRO Around 2018 Microbiology:  CHLAMYDIA/GC PCR Around 2018 Microbiology:  CULTURE, URINE Around 2018 Lab:  HEPATITIS PANEL, ACUTE Around 2018 Lab:  HIV 1/2 AG/AB, 4TH GENERATION,W RFLX CONFIRM Around 2018 Lab:  HSV-1 AB, IGG GLYCOPROTEIN, G-SPECIFIC Around 2018 Lab:  HSV-2 AB, IGG GLYCOPROTEIN, G-SPECIFIC Around 2018 Lab:  RPR Around 2018 Lab:  URINALYSIS W/ RFLX MICROSCOPIC Patient Instructions Acute High Blood Pressure: Care Instructions Your Care Instructions Acute high blood pressure is very high blood pressure. It's a serious problem. Very high blood pressure can damage your brain, heart, eyes, and kidneys. You may have been given medicines to lower your blood pressure. You may have gotten them as pills or through a needle in one of your veins. This is called an IV. And maybe you were given other medicines too. These can be needed when high blood pressure causes other problems. To keep your blood pressure at a lower level, you may need to make healthy lifestyle changes. And you will probably need to take medicines. Be sure to follow up with your doctor about your blood pressure and what you can do about it. Follow-up care is a key part of your treatment and safety. Be sure to make and go to all appointments, and call your doctor if you are having problems. It's also a good idea to know your test results and keep a list of the medicines you take. How can you care for yourself at home? · See your doctor as often as he or she recommends. This is to make sure your blood pressure is under control. You will probably go at least 2 times a year. But it may be more often at first. 
· Take your blood pressure medicine exactly as prescribed. You may take one or more types. They include diuretics, beta-blockers, ACE inhibitors, calcium channel blockers, and angiotensin II receptor blockers. Call your doctor if you think you are having a problem with your medicine. · If you take blood pressure medicine, talk to your doctor before you take decongestants or anti-inflammatory medicine, such as ibuprofen. These can raise blood pressure. · Learn how to check your blood pressure at home. Check it often. · Ask your doctor if it's okay to drink alcohol. · Talk to your doctor about lifestyle changes that can help blood pressure. These include being active and not smoking. When should you call for help? Call 911 anytime you think you may need emergency care. This may mean having symptoms that suggest that your blood pressure is causing a serious heart or blood vessel problem. Your blood pressure may be over 180/110. 
 For example, call 911 if: 
  · You have symptoms of a heart attack. These may include: ¨ Chest pain or pressure, or a strange feeling in the chest. 
¨ Sweating. ¨ Shortness of breath. ¨ Nausea or vomiting. ¨ Pain, pressure, or a strange feeling in the back, neck, jaw, or upper belly or in one or both shoulders or arms. ¨ Lightheadedness or sudden weakness. ¨ A fast or irregular heartbeat.  
  · You have symptoms of a stroke. These may include: ¨ Sudden numbness, tingling, weakness, or loss of movement in your face, arm, or leg, especially on only one side of your body. ¨ Sudden vision changes. ¨ Sudden trouble speaking. ¨ Sudden confusion or trouble understanding simple statements. ¨ Sudden problems with walking or balance. ¨ A sudden, severe headache that is different from past headaches.  
  · You have severe back or belly pain.  
 Do not wait until your blood pressure comes down on its own. Get help right away. 
 Call your doctor now or seek immediate care if: 
  · Your blood pressure is much higher than normal (such as 180/110 or higher), but you don't have symptoms.  
  · You think high blood pressure is causing symptoms, such as: ¨ Severe headache. ¨ Blurry vision.  
 Watch closely for changes in your health, and be sure to contact your doctor if: 
  · Your blood pressure measures 140/90 or higher at least 2 times. That means the top number is 140 or higher or the bottom number is 90 or higher, or both.  
  · You think you may be having side effects from your blood pressure medicine.  
  · Your blood pressure is usually normal, but it goes above normal at least 2 times. Where can you learn more? Go to http://charissa-maria elena.info/. Enter P217 in the search box to learn more about \"Acute High Blood Pressure: Care Instructions. \" Current as of: May 10, 2017 Content Version: 11.7 © 5022-3536 [employer]. Care instructions adapted under license by [employer] (which disclaims liability or warranty for this information). If you have questions about a medical condition or this instruction, always ask your healthcare professional. [place] [address] any warranty or liability for your use of this information. Hypothyroidism: Care Instructions Your Care Instructions You have hypothyroidism, which means that your body is not making enough thyroid hormone. This hormone helps your body use energy. If your thyroid level is low, you may feel tired, be constipated, have an increase in your blood pressure, or have dry skin or memory problems. You may also get cold easily, even when it is warm. Women with low thyroid levels may have heavy menstrual periods. A blood test to find your thyroid-stimulating hormone (TSH) level is used to check for hypothyroidism. A high TSH level may mean that you have low thyroid. When your body is not making enough thyroid hormone, TSH levels rise in an effort to make the body produce more. The treatment for hypothyroidism is to take thyroid hormone pills. You should start to feel better in 1 to 2 weeks. But it can take several months to see changes in the TSH level. You will need regular visits with your doctor to make sure you have the right dose of medicine. Most people need treatment for the rest of their lives. You will need to see your doctor regularly to have blood tests and to make sure you are doing well. Follow-up care is a key part of your treatment and safety. Be sure to make and go to all appointments, and call your doctor if you are having problems. It's also a good idea to know your test results and keep a list of the medicines you take. How can you care for yourself at home? · Take your thyroid hormone medicine exactly as prescribed. Call your doctor if you think you are having a problem with your medicine. Most people do not have side effects if they take the right amount of medicine regularly. ¨ Take the medicine 30 minutes before breakfast, and do not take it with calcium, vitamins, or iron. ¨ Do not take extra doses of your thyroid medicine. It will not help you get better any faster, and it may cause side effects. ¨ If you forget to take a dose, do NOT take a double dose of medicine. Take your usual dose the next day.  
· Tell your doctor about all prescription, herbal, or over-the-counter products you take. · Take care of yourself. Eat a healthy diet, get enough sleep, and get regular exercise. When should you call for help? Call 911 anytime you think you may need emergency care. For example, call if: 
  · You passed out (lost consciousness).  
  · You have severe trouble breathing.  
  · You have a very slow heartbeat (less than 60 beats a minute).  
  · You have a low body temperature (95°F or below).  
 Call your doctor now or seek immediate medical care if: 
  · You feel tired, sluggish, or weak.  
  · You have trouble remembering things or concentrating.  
  · You do not begin to feel better 2 weeks after starting your medicine.  
 Watch closely for changes in your health, and be sure to contact your doctor if you have any problems. Where can you learn more? Go to http://charissa-maria elena.info/. Enter Z630 in the search box to learn more about \"Hypothyroidism: Care Instructions. \" Current as of: May 12, 2017 Content Version: 11.7 © 7414-1747 [employer]. Care instructions adapted under license by [employer] (which disclaims liability or warranty for this information). If you have questions about a medical condition or this instruction, always ask your healthcare professional. [address] any warranty or liability for your use of this information. Introducing [facility]! [facility] introduces ID.me patient portal. Now you can access parts of your medical record, email your doctor's office, and request medication refills online. 1. In your internet browser, go to [email or web address] 2. Click on the First Time User? Click Here link in the Sign In box. You will see the New Member Sign Up page. 3. Enter your ID.me Access Code exactly as it appears below. You will not need to use this code after youve completed the sign-up process.  If you do not sign up before the expiration date, you must request a new code. · [employer] Access Code: [email or web address] Expires: 9/10/2018  4:04 PM 
 
4. Enter the last four digits of your Social Security Number (xxxx) and Date of Birth (mm/dd/yyyy) as indicated and click Submit. You will be taken to the next sign-up page. 5. Create a [employer] ID. This will be your [employer] login ID and cannot be changed, so think of one that is secure and easy to remember. 6. Create a [employer] password. You can change your password at any time. 7. Enter your Password Reset Question and Answer. This can be used at a later time if you forget your password. 8. Enter your e-mail address. You will receive e-mail notification when new information is available in [address] 9. Click Sign Up. You can now view and download portions of your medical record. 10. Click the Download Summary menu link to download a portable copy of your medical information. If you have questions, please visit the Frequently Asked Questions section of the [employer] website. Remember, [employer] is NOT to be used for urgent needs. For medical emergencies, dial 911. Now available from your iPhone and Android! Please provide this summary of care documentation to your next provider. Your primary care clinician is listed as Paddy Ruiz. If you have any questions after today's visit, please call [phone].

## 2018-08-01 NOTE — PROGRESS NOTES
Oziel Mariano  Identified pt with two pt identifiers(name and ). Chief Complaint   Patient presents with    Labs      review labs today and wants a consult        1. Have you been to the ER, urgent care clinic since your last visit? Hospitalized since your last visit? no    2. Have you seen or consulted any other health care providers outside of the [address] [place] [address] since your last visit? Include any pap smears or colon screening. no    Today's provider has been notified of reason for visit, vitals and flowsheets obtained on patients. Patient received paperwork for advance directive during previous visit but has not completed at this time     Reviewed record In preparation for visit, huddled with provider and have obtained necessary documentation      There are no preventive care reminders to display for this patient.     Wt Readings from Last 3 Encounters:   18 252 lb 12.8 oz (114.7 kg)   18 252 lb 14.4 oz (114.7 kg)   18 252 lb 9.6 oz (114.6 kg)     Temp Readings from Last 3 Encounters:   18 98.3 °F (36.8 °C) (Temporal)   18 98.7 °F (37.1 °C) (Temporal)   18 97.9 °F (36.6 °C) (Temporal)     BP Readings from Last 3 Encounters:   18 (!) 145/95   18 134/89   18 (!) 134/107     Pulse Readings from Last 3 Encounters:   18 81   18 65   18 (!) 104     Vitals:    18 1635   BP: (!) 145/95   Pulse: 81   Resp: 18   Temp: 98.3 °F (36.8 °C)   TempSrc: Temporal   SpO2: 99%   Weight: 252 lb 12.8 oz (114.7 kg)   Height: 6' 4.5\" (1.943 m)   PainSc:   0 - No pain         Learning Assessment:  :     Learning Assessment 2015   PRIMARY LEARNER Patient   PRIMARY LANGUAGE ENGLISH   LEARNER PREFERENCE PRIMARY OTHER (COMMENT)   ANSWERED BY pt   RELATIONSHIP SELF       Depression Screening:  :     PHQ over the last two weeks 2018   Little interest or pleasure in doing things Several days   Feeling down, depressed, irritable, or hopeless Several days   Total Score PHQ 2 2   Trouble falling or staying asleep, or sleeping too much -   Feeling tired or having little energy -   Poor appetite, weight loss, or overeating -   Feeling bad about yourself - or that you are a failure or have let yourself or your family down -   Trouble concentrating on things such as school, work, reading, or watching TV -   Moving or speaking so slowly that other people could have noticed; or the opposite being so fidgety that others notice -   Thoughts of being better off dead, or hurting yourself in some way -   PHQ 9 Score -   How difficult have these problems made it for you to do your work, take care of your home and get along with others -       Fall Risk Assessment:  :     No flowsheet data found. Abuse Screening:  :     No flowsheet data found. ADL Screening:  :     ADL Assessment 6/5/2018   Feeding yourself No Help Needed   Getting from bed to chair No Help Needed   Getting dressed No Help Needed   Bathing or showering No Help Needed   Walk across the room (includes cane/walker) No Help Needed   Using the telphone No Help Needed   Taking your medications No Help Needed   Preparing meals No Help Needed   Managing money (expenses/bills) No Help Needed   Moderately strenuous housework (laundry) No Help Needed   Shopping for personal items (toiletries/medicines) No Help Needed   Shopping for groceries No Help Needed   Driving No Help Needed   Climbing a flight of stairs No Help Needed   Getting to places beyond walking distances No Help Needed                 Medication reconciliation up to date and corrected with patient at this time.

## 2018-08-01 NOTE — LETTER
8/1/2018 4:38 PM 
 
Mr. Bharat Yee [id number] Your lab results have been reviewed and are as follows: Thyroid: 
Your thyroid function is better but still abnormal.  We will increase your levothyroxine to 50mcg daily.  I have escribed this to your pharmacy on file. Lidia oRmo will recheck this in one month. Cholesterol Panel: Excellent!! 
Total Cholesterol is 164 (goal <200). Triglycerides are 146.  (goal <150) Your HDL or \"good\" cholesterol is 50. (goal >40). Your LDL or \"bad\" cholesterol is 85.  (goal <100) Vitamin D: 
Your vitamin D is low at 30.9. This is an important nutrient bone health and to fight inflammation and infection. We will start a prescription to increase your Vitamin D level for the next 3 months.  You will take 50,000 international units Vitamin D2 in the form of one tab taken once weekly, I have sent this prescription to your pharmacy on file.  After you complete the prescription, start taking OTC Vitamin D3 2,000 international units daily.  After three months we will recheck your levels.  A normal value is between 40-50 on average.   
 
The symptoms of vitamin D deficiency are sometimes vague and can include tiredness and general aches and pains. Some people may not have any symptoms at all. Vitamin D also fights infections, including colds and the flu, as it regulates the expression of genes that influence your immune system to attack and destroy bacteria and viruses. Vitamin B12: 
Your Vitamin B12 level is very good. Office Visit on 07/27/2018 Component Date Value Ref Range Status  TSH 07/27/2018 3.300  0.450 - 4.500 uIU/mL Final  
 Cholesterol, total 07/27/2018 164  100 - 199 mg/dL Final  
 Triglyceride 07/27/2018 146  0 - 149 mg/dL Final  
 HDL Cholesterol 07/27/2018 50  >39 mg/dL Final  
 VLDL, calculated 07/27/2018 29  5 - 40 mg/dL Final  
 LDL, calculated 07/27/2018 85  0 - 99 mg/dL Final  
  VITAMIN D, 25-HYDROXY 07/27/2018 30.9  30.0 - 100.0 ng/mL Final  
 Vitamin B12 07/27/2018 1095  232 - 1245 pg/mL Final  
 INTERPRETATION 07/27/2018 Note   Final  
Office Visit on 06/27/2018 Component Date Value Ref Range Status  Specific Gravity 06/27/2018 1.020  1.005 - 1.030 Final  
 pH (UA) 06/27/2018 5.0  5.0 - 7.5 Final  
 Color 06/27/2018 Yellow  Yellow Final  
 Appearance 06/27/2018 Clear  Clear Final  
 Leukocyte Esterase 06/27/2018 Negative  Negative Final  
 Protein 06/27/2018 Negative  Negative/Trace Final  
 Glucose 06/27/2018 Negative  Negative Final  
 Ketone 06/27/2018 Negative  Negative Final  
 Blood 06/27/2018 Negative  Negative Final  
 Bilirubin 06/27/2018 Negative  Negative Final  
 Urobilinogen 06/27/2018 0.2  0.2 - 1.0 mg/dL Final  
 Nitrites 06/27/2018 Negative  Negative Final  
 Microscopic Examination 06/27/2018 Comment   Final  
 Cholesterol, total 06/27/2018 223* 100 - 199 mg/dL Final  
 Triglyceride 06/27/2018 152* 0 - 149 mg/dL Final  
 HDL Cholesterol 06/27/2018 49  >39 mg/dL Final  
 VLDL, calculated 06/27/2018 30  5 - 40 mg/dL Final  
 LDL, calculated 06/27/2018 144* 0 - 99 mg/dL Final  
 Glucose 06/27/2018 87  65 - 99 mg/dL Final  
 BUN 06/27/2018 10  6 - 24 mg/dL Final  
 Creatinine 06/27/2018 0.95  0.76 - 1.27 mg/dL Final  
 GFR est non-AA 06/27/2018 99  >59 mL/min/1.73 Final  
 GFR est AA 06/27/2018 114  >59 mL/min/1.73 Final  
 BUN/Creatinine ratio 06/27/2018 11  9 - 20 Final  
 Sodium 06/27/2018 141  134 - 144 mmol/L Final  
 Potassium 06/27/2018 4.4  3.5 - 5.2 mmol/L Final  
 Chloride 06/27/2018 106  96 - 106 mmol/L Final  
 CO2 06/27/2018 19* 20 - 29 mmol/L Final  
 Calcium 06/27/2018 9.2  8.7 - 10.2 mg/dL Final  
 Protein, total 06/27/2018 6.7  6.0 - 8.5 g/dL Final  
 Albumin 06/27/2018 4.1  3.5 - 5.5 g/dL Final  
 GLOBULIN, TOTAL 06/27/2018 2.6  1.5 - 4.5 g/dL Final  
 A-G Ratio 06/27/2018 1.6  1.2 - 2.2 Final  
  Bilirubin, total 06/27/2018 0.3  0.0 - 1.2 mg/dL Final  
 Alk. phosphatase 06/27/2018 79  39 - 117 IU/L Final  
 AST (SGOT) 06/27/2018 16  0 - 40 IU/L Final  
 ALT (SGPT) 06/27/2018 39  0 - 44 IU/L Final  
 TSH 06/27/2018 4.340  0.450 - 4.500 uIU/mL Final  
 Summary 06/27/2018 FINAL   Final  
 VITAMIN D, 25-HYDROXY 06/27/2018 29.5* 30.0 - 100.0 ng/mL Final  
 Hemoglobin A1c 06/27/2018 5.1  4.8 - 5.6 % Final  
 Estimated average glucose 06/27/2018 100  mg/dL Final  
 WBC 06/27/2018 7.2  3.4 - 10.8 x10E3/uL Final  
 RBC 06/27/2018 5.20  4. 14 - 5.80 x10E6/uL Final  
 HGB 06/27/2018 15.7  13.0 - 17.7 g/dL Final  
 HCT 06/27/2018 45.1  37.5 - 51.0 % Final  
 MCV 06/27/2018 87  79 - 97 fL Final  
 MCH 06/27/2018 30.2  26.6 - 33.0 pg Final  
 MCHC 06/27/2018 34.8  31.5 - 35.7 g/dL Final  
 RDW 06/27/2018 13.7  12.3 - 15.4 % Final  
 PLATELET 94/18/2893 035  150 - 379 x10E3/uL Final  
 NEUTROPHILS 06/27/2018 54  Not Estab. % Final  
 Lymphocytes 06/27/2018 34  Not Estab. % Final  
 MONOCYTES 06/27/2018 8  Not Estab. % Final  
 EOSINOPHILS 06/27/2018 3  Not Estab. % Final  
 BASOPHILS 06/27/2018 1  Not Estab. % Final  
 ABS. NEUTROPHILS 06/27/2018 3.9  1.4 - 7.0 x10E3/uL Final  
 Abs Lymphocytes 06/27/2018 2.4  0.7 - 3.1 x10E3/uL Final  
 ABS. MONOCYTES 06/27/2018 0.5  0.1 - 0.9 x10E3/uL Final  
 ABS. EOSINOPHILS 06/27/2018 0.2  0.0 - 0.4 x10E3/uL Final  
 ABS. BASOPHILS 06/27/2018 0.1  0.0 - 0.2 x10E3/uL Final  
 IMMATURE GRANULOCYTES 06/27/2018 0  Not Estab. % Final  
 ABS. IMM. GRANS. 06/27/2018 0.0  0.0 - 0.1 x10E3/uL Final  
 Vitamin B12 06/27/2018 816  232 - 1245 pg/mL Final  
 INTERPRETATION 06/27/2018 Note   Final  
 
 
 
Legend: Use this to help understand your labs. You may not have all of these ordered · WBC- White blood cell count · HGB- Hemoglobin, red blood cell count · HCT- Hematocrit, red blood cell count · PLT- Platelets · Sodium, Potassium, Chloride, Magnesium- electrolytes · Creatinine, GFR- kidney function · AST, ALT, Alkaline phosphatase, bilirubin- liver and gallbladder · Lipase- pancreas · RPR- Syphilis test, STD 
· HIV, Gonorrhea, Chlamydia, Trichomonas- STDs · Candida- yeast infection · Nuswab- vaginal infections · Urinalysis- a quick test about your urine · Hemoglobin A1C- diabetes test 
· Glucose- blood sugar · HDL- \"Good\" Cholesterol. Goal >=40 
· LDL- \"Bad\" Cholesterol. Goal <100 · Triglycerides- Goal <150 · Vit D- Vitamin D level · TSH, FT3, FT4- thyroid tests · HCV Ab- test for Hepatitis C Sincerely, Essence Perez NP

## 2018-08-01 NOTE — PATIENT INSTRUCTIONS
Acute High Blood Pressure: Care Instructions  Your Care Instructions    Acute high blood pressure is very high blood pressure. It's a serious problem. Very high blood pressure can damage your brain, heart, eyes, and kidneys. You may have been given medicines to lower your blood pressure. You may have gotten them as pills or through a needle in one of your veins. This is called an IV. And maybe you were given other medicines too. These can be needed when high blood pressure causes other problems. To keep your blood pressure at a lower level, you may need to make healthy lifestyle changes. And you will probably need to take medicines. Be sure to follow up with your doctor about your blood pressure and what you can do about it. Follow-up care is a key part of your treatment and safety. Be sure to make and go to all appointments, and call your doctor if you are having problems. It's also a good idea to know your test results and keep a list of the medicines you take. How can you care for yourself at home? · See your doctor as often as he or she recommends. This is to make sure your blood pressure is under control. You will probably go at least 2 times a year. But it may be more often at first.  · Take your blood pressure medicine exactly as prescribed. You may take one or more types. They include diuretics, beta-blockers, ACE inhibitors, calcium channel blockers, and angiotensin II receptor blockers. Call your doctor if you think you are having a problem with your medicine. · If you take blood pressure medicine, talk to your doctor before you take decongestants or anti-inflammatory medicine, such as ibuprofen. These can raise blood pressure. · Learn how to check your blood pressure at home. Check it often. · Ask your doctor if it's okay to drink alcohol. · Talk to your doctor about lifestyle changes that can help blood pressure. These include being active and not smoking.   When should you call for help?  Call 911 anytime you think you may need emergency care. This may mean having symptoms that suggest that your blood pressure is causing a serious heart or blood vessel problem. Your blood pressure may be over 180/110.   For example, call 911 if:    · You have symptoms of a heart attack. These may include:  ¨ Chest pain or pressure, or a strange feeling in the chest.  ¨ Sweating. ¨ Shortness of breath. ¨ Nausea or vomiting. ¨ Pain, pressure, or a strange feeling in the back, neck, jaw, or upper belly or in one or both shoulders or arms. ¨ Lightheadedness or sudden weakness. ¨ A fast or irregular heartbeat.     · You have symptoms of a stroke. These may include:  ¨ Sudden numbness, tingling, weakness, or loss of movement in your face, arm, or leg, especially on only one side of your body. ¨ Sudden vision changes. ¨ Sudden trouble speaking. ¨ Sudden confusion or trouble understanding simple statements. ¨ Sudden problems with walking or balance. ¨ A sudden, severe headache that is different from past headaches.     · You have severe back or belly pain.    Do not wait until your blood pressure comes down on its own. Get help right away.   Call your doctor now or seek immediate care if:    · Your blood pressure is much higher than normal (such as 180/110 or higher), but you don't have symptoms.     · You think high blood pressure is causing symptoms, such as:  ¨ Severe headache. ¨ Blurry vision.    Watch closely for changes in your health, and be sure to contact your doctor if:    · Your blood pressure measures 140/90 or higher at least 2 times. That means the top number is 140 or higher or the bottom number is 90 or higher, or both.     · You think you may be having side effects from your blood pressure medicine.     · Your blood pressure is usually normal, but it goes above normal at least 2 times. Where can you learn more? Go to http://charissa-maria elena.info/.   Enter Q484 in the search box to learn more about \"Acute High Blood Pressure: Care Instructions. \"  Current as of: May 10, 2017  Content Version: 11.7  © 1687-6047 [employer]. Care instructions adapted under license by [employer] (which disclaims liability or warranty for this information). If you have questions about a medical condition or this instruction, always ask your healthcare professional. [address] any warranty or liability for your use of this information. Hypothyroidism: Care Instructions  Your Care Instructions    You have hypothyroidism, which means that your body is not making enough thyroid hormone. This hormone helps your body use energy. If your thyroid level is low, you may feel tired, be constipated, have an increase in your blood pressure, or have dry skin or memory problems. You may also get cold easily, even when it is warm. Women with low thyroid levels may have heavy menstrual periods. A blood test to find your thyroid-stimulating hormone (TSH) level is used to check for hypothyroidism. A high TSH level may mean that you have low thyroid. When your body is not making enough thyroid hormone, TSH levels rise in an effort to make the body produce more. The treatment for hypothyroidism is to take thyroid hormone pills. You should start to feel better in 1 to 2 weeks. But it can take several months to see changes in the TSH level. You will need regular visits with your doctor to make sure you have the right dose of medicine. Most people need treatment for the rest of their lives. You will need to see your doctor regularly to have blood tests and to make sure you are doing well. Follow-up care is a key part of your treatment and safety. Be sure to make and go to all appointments, and call your doctor if you are having problems. It's also a good idea to know your test results and keep a list of the medicines you take.   How can you care for yourself at home?  · Take your thyroid hormone medicine exactly as prescribed. Call your doctor if you think you are having a problem with your medicine. Most people do not have side effects if they take the right amount of medicine regularly. ¨ Take the medicine 30 minutes before breakfast, and do not take it with calcium, vitamins, or iron. ¨ Do not take extra doses of your thyroid medicine. It will not help you get better any faster, and it may cause side effects. ¨ If you forget to take a dose, do NOT take a double dose of medicine. Take your usual dose the next day. · Tell your doctor about all prescription, herbal, or over-the-counter products you take. · Take care of yourself. Eat a healthy diet, get enough sleep, and get regular exercise. When should you call for help? Call 911 anytime you think you may need emergency care. For example, call if:    · You passed out (lost consciousness).     · You have severe trouble breathing.     · You have a very slow heartbeat (less than 60 beats a minute).     · You have a low body temperature (95°F or below).    Call your doctor now or seek immediate medical care if:    · You feel tired, sluggish, or weak.     · You have trouble remembering things or concentrating.     · You do not begin to feel better 2 weeks after starting your medicine.    Watch closely for changes in your health, and be sure to contact your doctor if you have any problems. Where can you learn more? Go to http://chairssa-maria elena.info/. Enter M798 in the search box to learn more about \"Hypothyroidism: Care Instructions. \"  Current as of: May 12, 2017  Content Version: 11.7  © 8928-3911 [employer]. Care instructions adapted under license by [employer] (which disclaims liability or warranty for this information).  If you have questions about a medical condition or this instruction, always ask your healthcare professional. Daniela Perkins any warranty or liability for your use of this information.

## 2018-08-01 NOTE — PROGRESS NOTES
Chief Complaint   Patient presents with    Labs      review labs today and wants a consult          HPI:  The patient is a 40 y.o. male who presents today for a follow up appointment. No hospital, ER or specialist visits since last primary care visit except as noted below. ADD:  Pt restarted on Adderall at his last visit. He reports his ADD symptoms were well controlled but now he feels it makes him jittery. He would like to try a different ADD medication. Depression:  He has been taking Wellbutrin BID with great improvement. He does report fatigue, states he previous PCP gave him a Vit B12 shot with good effectiveness. He reports considerably worsening depression this week, he reports 2-3 episodes of SI, he denies a plan and felt \"it would be ok if he did not wake up tomorrow\". At his last visit on 6/29/2018, he was started on Lamictal 25mg daily. He has now increased to 50mg daily and  reports his depressive symptoms are continuing to improve. He ran out of his Lamictal for approximately 1 week. He feels the Wellbutrin and Lamictal are managing his symptoms well. He has had diarrhea and feels the Lamictal is the cause of this. He stopped the Lamictal on Monday. He reports his diarrhea has resolved. HTN:  He stopped taking Lisinopril and was normotensive at his last visit on 6/29/2018. However he is still not taking his medication but his blood pressure has increased. He denies CP/SOB/palpitations, HECTOR, and HA. He has not been taking his Lisinopril and reports that he drank a red bull energy drink before he came to his visit. He was normotensive at his last visit. BP Readings from Last 3 Encounters:   08/01/18 (!) 145/95   07/27/18 134/89   07/13/18 (!) 134/107      Possible STD:  Patient reports burning with urination x 3 days. He denies any lesions present. Labs: reviewed with him in the office today. TSH 3.300, increased levothyroxine to 50mcg daily.   Vit D 30.9, continued prescription dose. XOL is excellent, well controlled on 20mg Lipitor, Vitamin B12 level is good since B12 injections started. Office Visit on 07/27/2018   Component Date Value Ref Range Status    TSH 07/27/2018 3.300  0.450 - 4.500 uIU/mL Final    Cholesterol, total 07/27/2018 164  100 - 199 mg/dL Final    Triglyceride 07/27/2018 146  0 - 149 mg/dL Final    HDL Cholesterol 07/27/2018 50  >39 mg/dL Final    VLDL, calculated 07/27/2018 29  5 - 40 mg/dL Final    LDL, calculated 07/27/2018 85  0 - 99 mg/dL Final    VITAMIN D, 25-HYDROXY 07/27/2018 30.9  30.0 - 100.0 ng/mL Final    Comment: Vitamin D deficiency has been defined by the [place] of  Medicine and an Endocrine Society practice guideline as a  level of serum 25-OH vitamin D less than 20 ng/mL (1,2). The Endocrine Society went on to further define vitamin D  insufficiency as a level between 21 and 29 ng/mL (2). 1. IOM ([place] of Medicine). 2010. Dietary reference     intakes for calcium and D. [facility]: The     [employer]. 2. Spenser MF, Bella NC, Charley CONNORS, et al.     Evaluation, treatment, and prevention of vitamin D     deficiency: an Endocrine Society clinical practice     guideline. JCEM. 2011 Jul; 96(7):1911-30.  Vitamin B12 07/27/2018 1095  232 - 1245 pg/mL Final    INTERPRETATION 07/27/2018 Note   Final    Supplemental report is available.    Office Visit on 06/27/2018   Component Date Value Ref Range Status    Specific Gravity 06/27/2018 1.020  1.005 - 1.030 Final    pH (UA) 06/27/2018 5.0  5.0 - 7.5 Final    Color 06/27/2018 Yellow  Yellow Final    Appearance 06/27/2018 Clear  Clear Final    Leukocyte Esterase 06/27/2018 Negative  Negative Final    Protein 06/27/2018 Negative  Negative/Trace Final    Glucose 06/27/2018 Negative  Negative Final    Ketone 06/27/2018 Negative  Negative Final    Blood 06/27/2018 Negative  Negative Final    Bilirubin 06/27/2018 Negative  Negative Final    Urobilinogen 06/27/2018 0.2  0.2 - 1.0 mg/dL Final    Nitrites 06/27/2018 Negative  Negative Final    Microscopic Examination 06/27/2018 Comment   Final    Microscopic not indicated and not performed.  Cholesterol, total 06/27/2018 223* 100 - 199 mg/dL Final    Triglyceride 06/27/2018 152* 0 - 149 mg/dL Final    HDL Cholesterol 06/27/2018 49  >39 mg/dL Final    VLDL, calculated 06/27/2018 30  5 - 40 mg/dL Final    LDL, calculated 06/27/2018 144* 0 - 99 mg/dL Final    Glucose 06/27/2018 87  65 - 99 mg/dL Final    BUN 06/27/2018 10  6 - 24 mg/dL Final    Creatinine 06/27/2018 0.95  0.76 - 1.27 mg/dL Final    GFR est non-AA 06/27/2018 99  >59 mL/min/1.73 Final    GFR est AA 06/27/2018 114  >59 mL/min/1.73 Final    BUN/Creatinine ratio 06/27/2018 11  9 - 20 Final    Sodium 06/27/2018 141  134 - 144 mmol/L Final    Potassium 06/27/2018 4.4  3.5 - 5.2 mmol/L Final    Chloride 06/27/2018 106  96 - 106 mmol/L Final    CO2 06/27/2018 19* 20 - 29 mmol/L Final                  **Please note reference interval change**    Calcium 06/27/2018 9.2  8.7 - 10.2 mg/dL Final    Protein, total 06/27/2018 6.7  6.0 - 8.5 g/dL Final    Albumin 06/27/2018 4.1  3.5 - 5.5 g/dL Final    GLOBULIN, TOTAL 06/27/2018 2.6  1.5 - 4.5 g/dL Final    A-G Ratio 06/27/2018 1.6  1.2 - 2.2 Final    Bilirubin, total 06/27/2018 0.3  0.0 - 1.2 mg/dL Final    Alk.  phosphatase 06/27/2018 79  39 - 117 IU/L Final    AST (SGOT) 06/27/2018 16  0 - 40 IU/L Final    ALT (SGPT) 06/27/2018 39  0 - 44 IU/L Final    TSH 06/27/2018 4.340  0.450 - 4.500 uIU/mL Final    Summary 06/27/2018 FINAL   Final    Comment: ====================================================================  [facility] DRUG ANALYSIS,[facility]  ====================================================================  Test                             Result       Flag       Units  Drug Present and Declared for Prescription Verification    Alprazolam                     150 EXPECTED   ng/mg creat    Alpha-hydroxyalprazolam        541          EXPECTED   ng/mg creat     Source of alprazolam is a scheduled prescription medication. Alpha-hydroxyalprazolam is an expected metabolite of alprazolam.  Drug Present not Declared for Prescription Verification    Amphetamine                    618          UNEXPECTED ng/mg creat     Amphetamine is available as a schedule II prescription drug. Bupropion                      PRESENT      UNEXPECTED    Ibuprofen                      PRESENT      UNEXPECTED  ====================================================================  Test                      Result    Flag   Units                                 Ref Range    Creatinine              191              mg/dL      >=20  ====================================================================  Declared Medications: The flagging and interpretation on this report are based on the   following declared medications. Unexpected results may arise from   inaccuracies in the declared medications. **Note: The testing scope of this panel includes these medications:   Alprazolam  ====================================================================  For clinical consultation, please call [phone].  ====================================================================      VITAMIN D, 25-HYDROXY 06/27/2018 29.5* 30.0 - 100.0 ng/mL Final    Comment: Vitamin D deficiency has been defined by the [address] practice guideline as a  level of serum 25-OH vitamin D less than 20 ng/mL (1,2). The Endocrine Society went on to further define vitamin D  insufficiency as a level between 21 and 29 ng/mL (2). 1. IOM ([place] of Medicine). 2010. Dietary reference     intakes for calcium and D. [address] [facility]: The     [employer].   2. Spenser MILLER, Bella MONTERO, Charley CONNORS, et al.     Evaluation, treatment, and prevention of vitamin D     deficiency: an Endocrine Society clinical practice     guideline. JCEM. 2011 Jul; 96(8):1911-30.  Hemoglobin A1c 06/27/2018 5.1  4.8 - 5.6 % Final    Comment:          Pre-diabetes: 5.7 - 6.4           Diabetes: >6.4           Glycemic control for adults with diabetes: <7.0      Estimated average glucose 06/27/2018 100  mg/dL Final    WBC 06/27/2018 7.2  3.4 - 10.8 x10E3/uL Final    RBC 06/27/2018 5.20  4. 14 - 5.80 x10E6/uL Final    HGB 06/27/2018 15.7  13.0 - 17.7 g/dL Final    HCT 06/27/2018 45.1  37.5 - 51.0 % Final    MCV 06/27/2018 87  79 - 97 fL Final    MCH 06/27/2018 30.2  26.6 - 33.0 pg Final    MCHC 06/27/2018 34.8  31.5 - 35.7 g/dL Final    RDW 06/27/2018 13.7  12.3 - 15.4 % Final    PLATELET 19/21/1888 460  150 - 379 x10E3/uL Final    NEUTROPHILS 06/27/2018 54  Not Estab. % Final    Lymphocytes 06/27/2018 34  Not Estab. % Final    MONOCYTES 06/27/2018 8  Not Estab. % Final    EOSINOPHILS 06/27/2018 3  Not Estab. % Final    BASOPHILS 06/27/2018 1  Not Estab. % Final    ABS. NEUTROPHILS 06/27/2018 3.9  1.4 - 7.0 x10E3/uL Final    Abs Lymphocytes 06/27/2018 2.4  0.7 - 3.1 x10E3/uL Final    ABS. MONOCYTES 06/27/2018 0.5  0.1 - 0.9 x10E3/uL Final    ABS. EOSINOPHILS 06/27/2018 0.2  0.0 - 0.4 x10E3/uL Final    ABS. BASOPHILS 06/27/2018 0.1  0.0 - 0.2 x10E3/uL Final    IMMATURE GRANULOCYTES 06/27/2018 0  Not Estab. % Final    ABS. IMM. GRANS. 06/27/2018 0.0  0.0 - 0.1 x10E3/uL Final    Vitamin B12 06/27/2018 816  232 - 1245 pg/mL Final    INTERPRETATION 06/27/2018 Note   Final    Supplemental report is available. Review of Systems  A comprehensive review of systems was negative except for that written in the HPI.     Patient Active Problem List   Diagnosis Code    Adjustment reaction with anxiety and depression F43.23    Vitamin D deficiency E55.9    Hypercholesterolemia E78.00    Hypertension I10    GERD (gastroesophageal reflux disease) K21.9    ADD (attention deficit disorder) without hyperactivity F98.8    Moderate major depression (HCC) F32.1    Vitamin B12 deficiency E53.8    Hypothyroidism E03.9       Past Medical History:   Diagnosis Date    ADD (attention deficit disorder) without hyperactivity 6/7/2018    Adjustment reaction with anxiety and depression     Attention deficit hyperactivity disorder (ADHD), predominantly inattentive type 6/7/2018    Cancer ([facility] [address]     testicular     Cancer ([facility] 2010    Testicular    Community acquired pneumonia     Depression     GERD (gastroesophageal reflux disease)     Headache     Hypercholesterolemia     Hypertension     Vitamin D deficiency        Past Surgical History:   Procedure Laterality Date    HX ORCHIECTOMY Right 2010    HX UROLOGICAL      removed on e testicle    HX WISDOM TEETH EXTRACTION  1996       Social History   Substance Use Topics    Smoking status: Former Smoker     Packs/day: 1.00     Years: 8.00     Types: Cigarettes     Quit date: 2008    Smokeless tobacco: Never Used    Alcohol use 6.0 oz/week     24 Cans of beer per week       Family History   Problem Relation Age of Onset    Cancer Mother      Ovarian    Hypertension Mother     Cancer Father      Throat    Hypertension Father     Arrhythmia Brother      Afib    No Known Problems Maternal Grandmother     No Known Problems Maternal Grandfather     No Known Problems Paternal Grandmother     No Known Problems Paternal Grandfather        Outpatient Prescriptions Marked as Taking for the 8/1/18 encounter (Office Visit) with Ramin Riojas NP   Medication Sig Dispense Refill    buPROPion SR (WELLBUTRIN SR) 150 mg SR tablet Take 1 Tab by mouth two (2) times a day. 60 Tab 0    Syringe with Needle, Safety (BD [employer]TE[employer]RA SYRINGE) 3 mL 25 gauge x 5/8\" syrg Use as directed monthly 3 Pen Needle 1    cyanocobalamin (VITAMIN B12) 1,000 mcg/mL injection 1 mL by IntraMUSCular route every thirty (30) days.  3 Vial 1    lisdexamfetamine (VYVANSE) 30 mg capsule Take 1 Cap (30 mg total) by mouth every morning. Max Daily Amount: 30 mg 30 Cap 0    levothyroxine (SYNTHROID) 50 mcg tablet Take 1 Tab by mouth Daily (before breakfast). 30 Tab 1    ergocalciferol (ERGOCALCIFEROL) 50,000 unit capsule Take 1 Cap by mouth every seven (7) days. 5 Cap 2    lamoTRIgine (LAMICTAL) 25 mg tablet Take 2 Tabs by mouth two (2) times a day. 120 Tab 2    atorvastatin (LIPITOR) 20 mg tablet Take 1 Tab by mouth daily. 30 Tab 1    multivitamin (ONE A DAY) tablet Take 1 Tab by mouth daily. No Known Allergies    PE:  Visit Vitals    BP (!) 145/95 (BP 1 Location: Left arm, BP Patient Position: Sitting)    Pulse 81    Temp 98.3 °F (36.8 °C) (Temporal)    Resp 18    Ht 6' 4.5\" (1.943 m)    Wt 252 lb 12.8 oz (114.7 kg)    SpO2 99%    BMI 30.37 kg/m2     Gen: alert, oriented, no acute distress  Head: normocephalic, atraumatic  Ears: external auditory canals clear, TMs without erythema or effusion  Eyes: pupils equal round reactive to light, sclera clear, conjunctiva clear  Oral: moist mucus membranes, no oral lesions, no pharyngeal inflammation or exudate  Neck: symmetric normal sized thyroid, no carotid bruits, no jugular vein distention  Resp: no increase work of breathing, lungs clear to ausculation bilaterally, no wheezing, rales or rhonchi  CV: S1, S2 normal.  No murmurs, rubs, or gallops. Abd: soft, not tender, not distended. No hepatosplenomegaly. Normal bowel sounds. No hernias. No abdominal or renal bruits. : normal appearing penis, no erythema, no lesion present, no discharge noted. Neuro: cranial nerves intact, normal strength and movement in all extremities, reflexes and sensation intact and symmetric. Skin: no lesion or rash  Extremities: no cyanosis or edema    Assessment/Plan:    ICD-10-CM ICD-9-CM    1. Adjustment reaction with anxiety and depression F43.23 309.28 buPROPion SR (WELLBUTRIN SR) 150 mg SR tablet   2.  ADD (attention deficit disorder) without hyperactivity F98.8 314.00 lisdexamfetamine (VYVANSE) 30 mg capsule   3. Exposure to sexually transmitted disease (STD) Z20.2 V01.6 AMB POC URINALYSIS DIP STICK AUTO W/ MICRO      CULTURE, URINE      URINALYSIS W/ RFLX MICROSCOPIC      RPR      HEPATITIS PANEL, ACUTE      HSV-1 AB, IGG GLYCOPROTEIN, G-SPECIFIC      HSV-2 AB, IGG GLYCOPROTEIN, G-SPECIFIC      HIV 1/2 AG/AB, 4TH GENERATION,W RFLX CONFIRM      CHLAMYDIA/GC PCR   4. Vitamin B12 deficiency E53.8 266.2 Syringe with Needle, Safety (BD INTEGRA SYRINGE) 3 mL 25 gauge x 5/8\" syrg      cyanocobalamin (VITAMIN B12) 1,000 mcg/mL injection   5. Hypercholesterolemia E78.00 272.0    6. Hypothyroidism, unspecified type E03.9 244.9    7. Essential hypertension I10 401.9      Follow-up Disposition:  Return in about 4 weeks (around 8/29/2018). lab results and schedule of future lab studies reviewed with patient  reviewed diet, exercise and weight control  cardiovascular risk and specific lipid/LDL goals reviewed  reviewed medications and side effects in detail    Health Maintenance reviewed - UTD. Recommended healthy diet low in carbohydrates, fats, sodium and cholesterol. Recommended regular cardiovascular exercise 3-6 times per week for 30-60 minutes daily. Chart is reviewed and updated today in the office. Records requested for other providers patient has seen and is currently seeing. Patient was offered a choice/choices in the treatment plan today. Patient expresses understanding of the plan and agrees with recommendations. Verbal and written instructions (see AVS) provided. See patient instructions for more. Patient expresses understanding of diagnosis and treatment plan.

## 2018-08-02 DIAGNOSIS — Z20.2 EXPOSURE TO SEXUALLY TRANSMITTED DISEASE (STD): ICD-10-CM

## 2018-08-02 LAB
BILIRUB UR QL STRIP: NEGATIVE
GLUCOSE UR-MCNC: NEGATIVE MG/DL
KETONES P FAST UR STRIP-MCNC: NEGATIVE MG/DL
PH UR STRIP: 5 [PH] (ref 4.6–8)
PROT UR QL STRIP: NEGATIVE
SP GR UR STRIP: 1 (ref 1–1.03)
UA UROBILINOGEN AMB POC: NORMAL (ref 0.2–1)
URINALYSIS CLARITY POC: CLEAR
URINALYSIS COLOR POC: YELLOW
URINE BLOOD POC: NEGATIVE
URINE LEUKOCYTES POC: NEGATIVE
URINE NITRITES POC: NEGATIVE

## 2018-08-10 ENCOUNTER — OFFICE VISIT (OUTPATIENT)
Dept: FAMILY MEDICINE CLINIC | Age: 43
End: 2018-08-10

## 2018-08-10 VITALS
WEIGHT: 248.6 LBS | DIASTOLIC BLOOD PRESSURE: 84 MMHG | TEMPERATURE: 97.8 F | RESPIRATION RATE: 17 BRPM | HEIGHT: 77 IN | SYSTOLIC BLOOD PRESSURE: 145 MMHG | OXYGEN SATURATION: 94 % | HEART RATE: 80 BPM | BODY MASS INDEX: 29.35 KG/M2

## 2018-08-10 DIAGNOSIS — F98.8 ADD (ATTENTION DEFICIT DISORDER) WITHOUT HYPERACTIVITY: ICD-10-CM

## 2018-08-10 DIAGNOSIS — Z20.2 EXPOSURE TO SEXUALLY TRANSMITTED DISEASE (STD): Primary | ICD-10-CM

## 2018-08-10 DIAGNOSIS — M79.642 PAIN OF LEFT HAND: ICD-10-CM

## 2018-08-10 DIAGNOSIS — F43.23 ADJUSTMENT REACTION WITH ANXIETY AND DEPRESSION: ICD-10-CM

## 2018-08-10 RX ORDER — HYDROCODONE BITARTRATE AND ACETAMINOPHEN 7.5; 325 MG/1; MG/1
1 TABLET ORAL
Qty: 60 TAB | Refills: 0 | Status: SHIPPED | OUTPATIENT
Start: 2018-08-10 | End: 2018-08-24 | Stop reason: ALTCHOICE

## 2018-08-10 RX ORDER — DEXTROAMPHETAMINE SACCHARATE, AMPHETAMINE ASPARTATE, DEXTROAMPHETAMINE SULFATE AND AMPHETAMINE SULFATE 5; 5; 5; 5 MG/1; MG/1; MG/1; MG/1
20 TABLET ORAL 2 TIMES DAILY
Qty: 60 TAB | Refills: 0 | Status: SHIPPED | OUTPATIENT
Start: 2018-08-10 | End: 2018-08-24

## 2018-08-10 RX ORDER — DEXTROAMPHETAMINE SACCHARATE, AMPHETAMINE ASPARTATE, DEXTROAMPHETAMINE SULFATE AND AMPHETAMINE SULFATE 7.5; 7.5; 7.5; 7.5 MG/1; MG/1; MG/1; MG/1
30 TABLET ORAL DAILY
Qty: 30 TAB | Refills: 0 | Status: SHIPPED | OUTPATIENT
Start: 2018-08-10 | End: 2018-08-10 | Stop reason: DRUGHIGH

## 2018-08-10 NOTE — MR AVS SNAPSHOT
[address] [place] [address] 
[address] [place] [address] 
[phone] Patient: Deepa Marie MRN: [id number] :1975 Visit Information Date & Time Provider Department Dept. Phone Encounter #  
 8/10/2018  9:10 AM Casper Qureshi NP [facility] [phone] [id number] Follow-up Instructions Return in about 4 weeks (around 2018) for Medication Check, Hypertension, XOL. Upcoming Health Maintenance Date Due Influenza Age 5 to Adult 2018* Pneumococcal 19-64 Highest Risk (1 of 3 - PCV13) 2018* DTaP/Tdap/Td series (1 - Tdap) 2019* *Topic was postponed. The date shown is not the original due date. Allergies as of 8/10/2018  Review Complete On: 8/10/2018 By: Casper Qureshi NP No Known Allergies Current Immunizations  Reviewed on 8/10/2018 No immunizations on file. Reviewed by Shiva Larios LPN on  at  9:29 AM  
You Were Diagnosed With   
  
 Codes Comments Exposure to sexually transmitted disease (STD)    -  Primary ICD-10-CM: Z20.2 ICD-9-CM: V01.6 Adjustment reaction with anxiety and depression     ICD-10-CM: F43.23 
ICD-9-CM: 309.28   
 ADD (attention deficit disorder) without hyperactivity     ICD-10-CM: F98.8 ICD-9-CM: 314.00 Pain of left hand     ICD-10-CM: I53.746 ICD-9-CM: 729.5 Vitals BP Pulse Temp Resp Height(growth percentile) 145/84 (BP 1 Location: Left arm, BP Patient Position: Sitting) 80 97.8 °F (36.6 °C) (Temporal) 17 6' 4.5\" (1.943 m) Weight(growth percentile) SpO2 BMI Smoking Status 248 lb 9.6 oz (112.8 kg) 94% 29.87 kg/m2 Former Smoker BMI and BSA Data Body Mass Index Body Surface Area  
 29.87 kg/m 2 2.47 m 2 Preferred Pharmacy Pharmacy Name Phone [facility]  [address] AT [address] [phone] Your Updated Medication List  
  
   
This list is accurate as of 8/10/18 11:15 AM.  Always use your most recent med list.  
  
  
  
  
 atorvastatin 20 mg tablet Commonly known as:  LIPITOR Take 1 Tab by mouth daily. buPROPion  mg SR tablet Commonly known as:  Jhoana Shirts Take 1 Tab by mouth two (2) times a day. cyanocobalamin 1,000 mcg/mL injection Commonly known as:  VITAMIN B12  
1 mL by IntraMUSCular route every thirty (30) days. dextroamphetamine-amphetamine 30 mg tablet Commonly known as:  ADDERALL Take 1 Tab by mouth daily. Max Daily Amount: 1 Tab  
  
 ergocalciferol 50,000 unit capsule Commonly known as:  ERGOCALCIFEROL Take 1 Cap by mouth every seven (7) days. HYDROcodone-acetaminophen 7.5-325 mg per tablet Commonly known as:  Henrique Handler Take 1 Tab by mouth every six (6) hours as needed for Pain. Max Daily Amount: 4 Tabs. lamoTRIgine 25 mg tablet Commonly known as: LaMICtal  
Take 2 Tabs by mouth two (2) times a day. levothyroxine 50 mcg tablet Commonly known as:  SYNTHROID Take 1 Tab by mouth Daily (before breakfast). multivitamin tablet Commonly known as:  ONE A DAY Take 1 Tab by mouth daily. Syringe with Needle, Safety 3 mL 25 gauge x 5/8\" Syrg Commonly known as:  BD INTEGRA SYRINGE Use as directed monthly Prescriptions Printed Refills  
 dextroamphetamine-amphetamine (ADDERALL) 30 mg tablet 0 Sig: Take 1 Tab by mouth daily. Max Daily Amount: 1 Tab Class: Print Route: Oral  
 HYDROcodone-acetaminophen (NORCO) 7.5-325 mg per tablet 0 Sig: Take 1 Tab by mouth every six (6) hours as needed for Pain. Max Daily Amount: 4 Tabs. Class: Print Route: Oral  
  
We Performed the Following CHLAMYDIA/GC PCR [36306 CPT(R)] CULTURE, URINE [id number] CPT(R)] HEPATITIS PANEL, ACUTE [06675 CPT(R)] HIV 1/2 AG/AB, 4TH GENERATION,W RFLX CONFIRM [id number] CPT(R)] HSV TYPE 2-SPECIFIC ABS, IGG W/REFL SUPPLEMENTAL TESTING [09709 CPT(R)] HSV-1 AB, IGG GLYCOPROTEIN, G-SPECIFIC [id number] CPT(R)] RPR [75914 CPT(R)] URINALYSIS W/ RFLX MICROSCOPIC [54479 CPT(R)] Follow-up Instructions Return in about 4 weeks (around 9/7/2018) for Medication Check, Hypertension, XOL. Patient Instructions Hand Pain: Care Instructions Your Care Instructions Common causes of hand pain are overuse and injuries, such as might happen during sports or home repair projects. Everyday wear and tear, especially as you get older, also can cause hand pain. Most minor hand injuries will heal on their own, and home treatment is usually all you need to do. If you have sudden and severe pain, you may need tests and treatment. Follow-up care is a key part of your treatment and safety. Be sure to make and go to all appointments, and call your doctor if you are having problems. It's also a good idea to know your test results and keep a list of the medicines you take. How can you care for yourself at home? · Take pain medicines exactly as directed. ¨ If the doctor gave you a prescription medicine for pain, take it as prescribed. ¨ If you are not taking a prescription pain medicine, ask your doctor if you can take an over-the-counter medicine. · Rest and protect your hand. Take a break from any activity that may cause pain. · Put ice or a cold pack on your hand for 10 to 20 minutes at a time. Put a thin cloth between the ice and your skin. · Prop up the sore hand on a pillow when you ice it or anytime you sit or lie down during the next 3 days. Try to keep it above the level of your heart. This will help reduce swelling. · If your doctor recommends a sling, splint, or elastic bandage to support your hand, wear it as directed. When should you call for help? Call 911 anytime you think you may need emergency care. For example, call if:   · Your hand turns cool or pale or changes color.  
 Call your doctor now or seek immediate medical care if: 
  · You cannot move your hand.  
  · Your hand pops, moves out of its normal position, and then returns to its normal position.  
  · You have signs of infection, such as: 
¨ Increased pain, swelling, warmth, or redness. ¨ Red streaks leading from the sore area. ¨ Pus draining from a place on your hand. ¨ A fever.  
  · Your hand feels numb or tingly.  
 Watch closely for changes in your health, and be sure to contact your doctor if: 
  · Your hand feels unstable when you try to use it.  
  · You do not get better as expected.  
  · You have any new symptoms, such as swelling.  
  · Bruises from an injury to your hand last longer than 2 weeks. Where can you learn more? Go to http://charissa-maria elena.info/. Enter R273 in the search box to learn more about \"Hand Pain: Care Instructions. \" Current as of: November 20, 2017 Content Version: 11.7 © 3809-1841 blabfeed. Care instructions adapted under license by Fortnox (which disclaims liability or warranty for this information). If you have questions about a medical condition or this instruction, always ask your healthcare professional. [address] any warranty or liability for your use of this information. Recovering From Depression: Care Instructions Your Care Instructions Taking good care of yourself is important as you recover from depression. In time, your symptoms will fade as your treatment takes hold. Do not give up. Instead, focus your energy on getting better. Your mood will improve. It just takes some time. Focus on things that can help you feel better, such as being with friends and family, eating well, and getting enough rest. But take things slowly. Do not do too much too soon. You will begin to feel better gradually. Follow-up care is a key part of your treatment and safety. Be sure to make and go to all appointments, and call your doctor if you are having problems. It's also a good idea to know your test results and keep a list of the medicines you take. How can you care for yourself at home? Be realistic · If you have a large task to do, break it up into smaller steps you can handle, and just do what you can. · You may want to put off important decisions until your depression has lifted. If you have plans that will have a major impact on your life, such as marriage, divorce, or a job change, try to wait a bit. Talk it over with friends and loved ones who can help you look at the overall picture first. 
· Reaching out to people for help is important. Do not isolate yourself. Let your family and friends help you. Find someone you can trust and confide in, and talk to that person. · Be patient, and be kind to yourself. Remember that depression is not your fault and is not something you can overcome with willpower alone. Treatment is necessary for depression, just like for any other illness. Feeling better takes time, and your mood will improve little by little. Stay active · Stay busy and get outside. Take a walk, or try some other light exercise. · Talk with your doctor about an exercise program. Exercise can help with mild depression. · Go to a movie or concert. Take part in a Episcopalian activity or other social gathering. Go to a ball game. · Ask a friend to have dinner with you. Take care of yourself · Eat a balanced diet with plenty of fresh fruits and vegetables, whole grains, and lean protein. If you have lost your appetite, eat small snacks rather than large meals. · Avoid drinking alcohol or using illegal drugs. Do not take medicines that have not been prescribed for you. They may interfere with medicines you may be taking for depression, or they may make your depression worse. · Take your medicines exactly as they are prescribed. You may start to feel better within 1 to 3 weeks of taking antidepressant medicine. But it can take as many as 6 to 8 weeks to see more improvement. If you have questions or concerns about your medicines, or if you do not notice any improvement by 3 weeks, talk to your doctor. · If you have any side effects from your medicine, tell your doctor. Antidepressants can make you feel tired, dizzy, or nervous. Some people have dry mouth, constipation, headaches, sexual problems, or diarrhea. Many of these side effects are mild and will go away on their own after you have been taking the medicine for a few weeks. Some may last longer. Talk to your doctor if side effects are bothering you too much. You might be able to try a different medicine. · Get enough sleep. If you have problems sleeping: ¨ Go to bed at the same time every night, and get up at the same time every morning. ¨ Keep your bedroom dark and quiet. ¨ Do not exercise after 5:00 p.m. ¨ Avoid drinks with caffeine after 5:00 p.m. · Avoid sleeping pills unless they are prescribed by the doctor treating your depression. Sleeping pills may make you groggy during the day, and they may interact with other medicine you are taking. · If you have any other illnesses, such as diabetes, heart disease, or high blood pressure, make sure to continue with your treatment. Tell your doctor about all of the medicines you take, including those with or without a prescription. · Keep the numbers for these national suicide hotlines: 0-184-765-TALK (6-770.625.9902) and 8-464-FLZVHVG (9-291.982.2825). If you or someone you know talks about suicide or feeling hopeless, get help right away. When should you call for help? Call 911 anytime you think you may need emergency care.  For example, call if: 
  · You feel like hurting yourself or someone else.  
  · Someone you know has depression and is about to attempt or is attempting suicide.  
 Call your doctor now or seek immediate medical care if: 
  · You hear voices.  
  · Someone you know has depression and: 
¨ Starts to give away his or her possessions. ¨ Uses illegal drugs or drinks alcohol heavily. ¨ Talks or writes about death, including writing suicide notes or talking about guns, knives, or pills. ¨ Starts to spend a lot of time alone. ¨ Acts very aggressively or suddenly appears calm.  
 Watch closely for changes in your health, and be sure to contact your doctor if: 
  · You do not get better as expected. Where can you learn more? Go to http://charissaBluFrog Path Lab Solutionsmaria elena.info/. Enter T918 in the search box to learn more about \"Recovering From Depression: Care Instructions. \" Current as of: December 7, 2017 Content Version: 11.7 © 0744-0238 Healthwise, Incorporated. Care instructions adapted under license by Schedulicity (which disclaims liability or warranty for this information). If you have questions about a medical condition or this instruction, always ask your healthcare professional. [place] [address] any warranty or liability for your use of this information. Introducing [facility]! [employer] introduces Traansmission patient portal. Now you can access parts of your medical record, email your doctor's office, and request medication refills online. 1. In your internet browser, go to [email or web address] 2. Click on the First Time User? Click Here link in the Sign In box. You will see the New Member Sign Up page. 3. Enter your Traansmission Access Code exactly as it appears below. You will not need to use this code after youve completed the sign-up process. If you do not sign up before the expiration date, you must request a new code. · Traansmission Access Code: [email or web address] Expires: 9/10/2018  4:04 PM 
 
4.  Enter the last four digits of your Social Security Number (xxxx) and Date of Birth (mm/dd/yyyy) as indicated and click Submit. You will be taken to the next sign-up page. 5. Create a Bioxodes ID. This will be your Bioxodes login ID and cannot be changed, so think of one that is secure and easy to remember. 6. Create a Bioxodes password. You can change your password at any time. 7. Enter your Password Reset Question and Answer. This can be used at a later time if you forget your password. 8. Enter your e-mail address. You will receive e-mail notification when new information is available in [address] 9. Click Sign Up. You can now view and download portions of your medical record. 10. Click the Download Summary menu link to download a portable copy of your medical information. If you have questions, please visit the Frequently Asked Questions section of the Bioxodes website. Remember, Bioxodes is NOT to be used for urgent needs. For medical emergencies, dial 911. Now available from your iPhone and Android! Please provide this summary of care documentation to your next provider. Your primary care clinician is listed as Stephany Linares. Yosi Harrison. If you have any questions after today's visit, please call [phone].

## 2018-08-10 NOTE — PATIENT INSTRUCTIONS
Hand Pain: Care Instructions  Your Care Instructions    Common causes of hand pain are overuse and injuries, such as might happen during sports or home repair projects. Everyday wear and tear, especially as you get older, also can cause hand pain. Most minor hand injuries will heal on their own, and home treatment is usually all you need to do. If you have sudden and severe pain, you may need tests and treatment. Follow-up care is a key part of your treatment and safety. Be sure to make and go to all appointments, and call your doctor if you are having problems. It's also a good idea to know your test results and keep a list of the medicines you take. How can you care for yourself at home? · Take pain medicines exactly as directed. ¨ If the doctor gave you a prescription medicine for pain, take it as prescribed. ¨ If you are not taking a prescription pain medicine, ask your doctor if you can take an over-the-counter medicine. · Rest and protect your hand. Take a break from any activity that may cause pain. · Put ice or a cold pack on your hand for 10 to 20 minutes at a time. Put a thin cloth between the ice and your skin. · Prop up the sore hand on a pillow when you ice it or anytime you sit or lie down during the next 3 days. Try to keep it above the level of your heart. This will help reduce swelling. · If your doctor recommends a sling, splint, or elastic bandage to support your hand, wear it as directed. When should you call for help? Call 911 anytime you think you may need emergency care. For example, call if:    · Your hand turns cool or pale or changes color.    Call your doctor now or seek immediate medical care if:    · You cannot move your hand.     · Your hand pops, moves out of its normal position, and then returns to its normal position.     · You have signs of infection, such as:  ¨ Increased pain, swelling, warmth, or redness. ¨ Red streaks leading from the sore area.   ¨ Pus draining from a place on your hand. ¨ A fever.     · Your hand feels numb or tingly.    Watch closely for changes in your health, and be sure to contact your doctor if:    · Your hand feels unstable when you try to use it.     · You do not get better as expected.     · You have any new symptoms, such as swelling.     · Bruises from an injury to your hand last longer than 2 weeks. Where can you learn more? Go to http://charissa-maria elena.info/. Enter R273 in the search box to learn more about \"Hand Pain: Care Instructions. \"  Current as of: November 20, 2017  Content Version: 11.7  © 9256-9199 Snackr. Care instructions adapted under license by Histogenics (which disclaims liability or warranty for this information). If you have questions about a medical condition or this instruction, always ask your healthcare professional. [place] [address] any warranty or liability for your use of this information. Recovering From Depression: Care Instructions  Your Care Instructions    Taking good care of yourself is important as you recover from depression. In time, your symptoms will fade as your treatment takes hold. Do not give up. Instead, focus your energy on getting better. Your mood will improve. It just takes some time. Focus on things that can help you feel better, such as being with friends and family, eating well, and getting enough rest. But take things slowly. Do not do too much too soon. You will begin to feel better gradually. Follow-up care is a key part of your treatment and safety. Be sure to make and go to all appointments, and call your doctor if you are having problems. It's also a good idea to know your test results and keep a list of the medicines you take. How can you care for yourself at home? Be realistic  · If you have a large task to do, break it up into smaller steps you can handle, and just do what you can.   · You may want to put off important decisions until your depression has lifted. If you have plans that will have a major impact on your life, such as marriage, divorce, or a job change, try to wait a bit. Talk it over with friends and loved ones who can help you look at the overall picture first.  · Reaching out to people for help is important. Do not isolate yourself. Let your family and friends help you. Find someone you can trust and confide in, and talk to that person. · Be patient, and be kind to yourself. Remember that depression is not your fault and is not something you can overcome with willpower alone. Treatment is necessary for depression, just like for any other illness. Feeling better takes time, and your mood will improve little by little. Stay active  · Stay busy and get outside. Take a walk, or try some other light exercise. · Talk with your doctor about an exercise program. Exercise can help with mild depression. · Go to a movie or concert. Take part in a Yazidi activity or other social gathering. Go to a ball game. · Ask a friend to have dinner with you. Take care of yourself  · Eat a balanced diet with plenty of fresh fruits and vegetables, whole grains, and lean protein. If you have lost your appetite, eat small snacks rather than large meals. · Avoid drinking alcohol or using illegal drugs. Do not take medicines that have not been prescribed for you. They may interfere with medicines you may be taking for depression, or they may make your depression worse. · Take your medicines exactly as they are prescribed. You may start to feel better within 1 to 3 weeks of taking antidepressant medicine. But it can take as many as 6 to 8 weeks to see more improvement. If you have questions or concerns about your medicines, or if you do not notice any improvement by 3 weeks, talk to your doctor. · If you have any side effects from your medicine, tell your doctor. Antidepressants can make you feel tired, dizzy, or nervous. Some people have dry mouth, constipation, headaches, sexual problems, or diarrhea. Many of these side effects are mild and will go away on their own after you have been taking the medicine for a few weeks. Some may last longer. Talk to your doctor if side effects are bothering you too much. You might be able to try a different medicine. · Get enough sleep. If you have problems sleeping:  ¨ Go to bed at the same time every night, and get up at the same time every morning. ¨ Keep your bedroom dark and quiet. ¨ Do not exercise after 5:00 p.m. ¨ Avoid drinks with caffeine after 5:00 p.m. · Avoid sleeping pills unless they are prescribed by the doctor treating your depression. Sleeping pills may make you groggy during the day, and they may interact with other medicine you are taking. · If you have any other illnesses, such as diabetes, heart disease, or high blood pressure, make sure to continue with your treatment. Tell your doctor about all of the medicines you take, including those with or without a prescription. · Keep the numbers for these national suicide hotlines: 1-546-626-TALK (4-390.782.7808) and 1-672-YFOYMHY (7-169.342.9804). If you or someone you know talks about suicide or feeling hopeless, get help right away. When should you call for help? Call 911 anytime you think you may need emergency care. For example, call if:    · You feel like hurting yourself or someone else.     · Someone you know has depression and is about to attempt or is attempting suicide.   [facility] your doctor now or seek immediate medical care if:    · You hear voices.     · Someone you know has depression and:  ¨ Starts to give away his or her possessions. ¨ Uses illegal drugs or drinks alcohol heavily. ¨ Talks or writes about death, including writing suicide notes or talking about guns, knives, or pills. ¨ Starts to spend a lot of time alone.   ¨ Acts very aggressively or suddenly appears calm.    Watch closely for changes in your health, and be sure to contact your doctor if:    · You do not get better as expected. Where can you learn more? Go to http://charissa-maria elena.info/. Enter A273 in the search box to learn more about \"Recovering From Depression: Care Instructions. \"  Current as of: December 7, 2017  Content Version: 11.7  © 4880-6958 ColdSpark. Care instructions adapted under license by EarLens (which disclaims liability or warranty for this information). If you have questions about a medical condition or this instruction, always ask your healthcare professional. [place] [address] any warranty or liability for your use of this information.

## 2018-08-10 NOTE — LETTER
NOTIFICATION RETURN TO WORK / SCHOOL 
 
8/10/2018 11:16 AM 
 
Mr. Sander Garcia 
[facility] [phone] 
 
 
  
To Whom It May Concern: 
  
Sander Garcia is currently under the care of [facility].   
He may return to work full duty on 8/14/2018 with no restrictions. 
  
If there are questions or concerns please have the patient contact our office. 
  
  
  
Divya Gutierrez, MSN, MHA, FNP-BC

## 2018-08-10 NOTE — PROGRESS NOTES
Chief Complaint   Patient presents with    Medication Refill     pt wants to discuss medication with provider    Labs     pt is not fasting: ate a banana         HPI:  The patient is a 40 y.o. male who presents today for a follow up appointment. No hospital, ER or specialist visits since last primary care visit except as noted below. ADD:  Pt restarted on Adderall at his last visit. He reports his ADD symptoms were well controlled but now he feels it makes him jittery. He would like to try a different ADD medication, he was changed to Vyvanse on 8/1/2018. He does not feel it works as well as Adderall. Depression:  He has been taking Wellbutrin BID with great improvement. He does report fatigue, states he previous PCP gave him a Vit B12 shot with good effectiveness. He reports considerably worsening depression this week, he reports 2-3 episodes of SI, he denies a plan and felt \"it would be ok if he did not wake up tomorrow\". At his last visit on 6/29/2018, he was started on Lamictal 25mg daily. He has now increased to 50mg daily and  reports his depressive symptoms are continuing to improve. He ran out of his Lamictal for approximately 1 week. He felt the Wellbutrin and Lamictal were managing his symptoms well. He had diarrhea and felt the Lamictal was the cause of this, therefore he stopped the Lamictal.  He reports his diarrhea resolved. He is now only taking Wellbutrin and feels his depressive symptoms are well managed. HTN:  He stopped taking Lisinopril and was normotensive at his last visit on 6/29/2018. However he is still not taking his medication but his blood pressure has increased. He denies CP/SOB/palpitations, HECTOR, and HA. He has not been taking his Lisinopril and reports that he drank a red bull energy drink before he came to his visit. He was normotensive at his last visit. However his blood pressure continues to be labile.     BP Readings from Last 3 Encounters:   08/10/18 145/84   08/01/18 (!) 145/95   07/27/18 134/89      Possible STD:  Patient reports burning with urination x 3 days. He denies any lesions present. Labs: reviewed with him in the office today. TSH 3.300, increased levothyroxine to 50mcg daily. Vit D 30.9, continued prescription dose. XOL is excellent, well controlled on 20mg Lipitor, Vitamin B12 level is good since B12 injections started. Hand Pain: Pt continues to have hand pain that reoccurs with all use. Review of Systems  A comprehensive review of systems was negative except for that written in the HPI.     Patient Active Problem List   Diagnosis Code    Adjustment reaction with anxiety and depression F43.23    Vitamin D deficiency E55.9    Hypercholesterolemia E78.00    Hypertension I10    GERD (gastroesophageal reflux disease) K21.9    ADD (attention deficit disorder) without hyperactivity F98.8    Moderate major depression ([address] F32.1    Vitamin B12 deficiency E53.8    Hypothyroidism E03.9       Past Medical History:   Diagnosis Date    ADD (attention deficit disorder) without hyperactivity 6/7/2018    Adjustment reaction with anxiety and depression     Attention deficit hyperactivity disorder (ADHD), predominantly inattentive type 6/7/2018    Cancer ([facility]     testicular     Cancer ([facility] 2010    Testicular    Community acquired pneumonia     Depression     GERD (gastroesophageal reflux disease)     Headache     Hypercholesterolemia     Hypertension     Vitamin D deficiency        Past Surgical History:   Procedure Laterality Date    HX ORCHIECTOMY Right 2010    HX UROLOGICAL      removed on e testicle    HX WISDOM TEETH EXTRACTION  1996       Social History   Substance Use Topics    Smoking status: Former Smoker     Packs/day: 1.00     Years: 8.00     Types: Cigarettes     Quit date: 2008    Smokeless tobacco: Never Used    Alcohol use 6.0 oz/week     24 Cans of beer per week       Family History   Problem Relation Age of Onset    Cancer Mother      Ovarian    Hypertension Mother     Cancer Father      Throat    Hypertension Father     Arrhythmia Brother      Afib    No Known Problems Maternal Grandmother     No Known Problems Maternal Grandfather     No Known Problems Paternal Grandmother     No Known Problems Paternal Grandfather        Outpatient Prescriptions Marked as Taking for the 8/10/18 encounter (Office Visit) with Giovana Coleman NP   Medication Sig Dispense Refill    HYDROcodone-acetaminophen (Latricia Barrs) 7.5-325 mg per tablet Take 1 Tab by mouth every six (6) hours as needed for Pain. Max Daily Amount: 4 Tabs. 60 Tab 0    dextroamphetamine-amphetamine (ADDERALL) 20 mg tablet Take 1 Tab (20 mg total) by mouth two (2) times a day. Max Daily Amount: 40 mg 60 Tab 0    buPROPion SR (WELLBUTRIN SR) 150 mg SR tablet Take 1 Tab by mouth two (2) times a day. 60 Tab 0    Syringe with Needle, Safety (BD INTEGRA SYRINGE) 3 mL 25 gauge x 5/8\" syrg Use as directed monthly 3 Pen Needle 1    cyanocobalamin (VITAMIN B12) 1,000 mcg/mL injection 1 mL by IntraMUSCular route every thirty (30) days. 3 Vial 1    levothyroxine (SYNTHROID) 50 mcg tablet Take 1 Tab by mouth Daily (before breakfast). 30 Tab 1    ergocalciferol (ERGOCALCIFEROL) 50,000 unit capsule Take 1 Cap by mouth every seven (7) days. 5 Cap 2    lamoTRIgine (LAMICTAL) 25 mg tablet Take 2 Tabs by mouth two (2) times a day. 120 Tab 2    atorvastatin (LIPITOR) 20 mg tablet Take 1 Tab by mouth daily. 30 Tab 1    multivitamin (ONE A DAY) tablet Take 1 Tab by mouth daily.          No Known Allergies    PE:  Visit Vitals    /84 (BP 1 Location: Left arm, BP Patient Position: Sitting)    Pulse 80    Temp 97.8 °F (36.6 °C) (Temporal)    Resp 17    Ht 6' 4.5\" (1.943 m)    Wt 248 lb 9.6 oz (112.8 kg)    SpO2 94%    BMI 29.87 kg/m2     Gen: alert, oriented, no acute distress  Head: normocephalic, atraumatic  Ears: external auditory canals clear, TMs without erythema or effusion  Eyes: pupils equal round reactive to light, sclera clear, conjunctiva clear  Oral: moist mucus membranes, no oral lesions, no pharyngeal inflammation or exudate  Neck: symmetric normal sized thyroid, no carotid bruits, no jugular vein distention  Resp: no increase work of breathing, lungs clear to ausculation bilaterally, no wheezing, rales or rhonchi  CV: S1, S2 normal.  No murmurs, rubs, or gallops. Abd: soft, not tender, not distended. No hepatosplenomegaly. Normal bowel sounds. No hernias. No abdominal or renal bruits. Neuro: cranial nerves intact, normal strength and movement in all extremities, reflexes and sensation intact and symmetric. Skin: no lesion or rash  Extremities: no cyanosis or edema    Assessment/Plan:    ICD-10-CM ICD-9-CM    1. Exposure to sexually transmitted disease (STD) Z20.2 V01.6 URINALYSIS W/ RFLX MICROSCOPIC      RPR      HEPATITIS PANEL, ACUTE      HIV 1/2 AG/AB, 4TH GENERATION,W RFLX CONFIRM      CHLAMYDIA/GC PCR      CULTURE, URINE      HSV-1 AB, IGG GLYCOPROTEIN, G-SPECIFIC      HSV TYPE 2-SPECIFIC ABS, IGG W/REFL SUPPLEMENTAL TESTING   2. Adjustment reaction with anxiety and depression F43.23 309.28    3. ADD (attention deficit disorder) without hyperactivity F98.8 314.00 dextroamphetamine-amphetamine (ADDERALL) 20 mg tablet      DISCONTINUED: dextroamphetamine-amphetamine (ADDERALL) 30 mg tablet   4. Pain of left hand M79.642 729.5 HYDROcodone-acetaminophen (NORCO) 7.5-325 mg per tablet     Follow-up Disposition:  Return in about 4 weeks (around 9/7/2018) for Medication Check, Hypertension, XOL.   lab results and schedule of future lab studies reviewed with patient  reviewed diet, exercise and weight control  reviewed medications and side effects in detail  Labs need to be checked:  TSH 9/2018  Vit D 11/2018  XOL 12/2018    Patient to RTW on Tuesday, August 14, 2018    Controlled substance plan and safety assessment:  Medication: Lortab 7.5/325  MME/day: 30   >= 50? N/A   >= 120? N/A             Naloxone Rx? yes   appropriate and last checked on: 8/10/2018  Last Urine Toxicology: 6/27/2018, appropriate  Treatment agreement was signed by pt and myself on: 6/25/2018    24 hour opioid dose >120mg morphine equivalent/day:  [] Yes   [x] No  Benzodiazepines:  [] Yes   [x] No  Sleep apnea:  [] Yes   [x] No  Urine Toxicology Testing within last 6 months:  [x] Yes   [] No  History of or new aberrant medication taking behaviors:  [] Yes   [x] No    Controlled Substance Refills given  Date prescription signed by me  medication  Amount Refills   8/15/2018  Lortab 7.5/325 60 0   8/15/2018  Adderall 20mg BID 60 0     Health Maintenance reviewed - deferred, awaiting records from his employer. Recommended healthy diet low in carbohydrates, fats, sodium and cholesterol. Recommended regular cardiovascular exercise 3-6 times per week for 30-60 minutes daily. Chart is reviewed and updated today in the office. Records requested for other providers patient has seen and is currently seeing. Patient was offered a choice/choices in the treatment plan today. Patient expresses understanding of the plan and agrees with recommendations. Verbal and written instructions (see AVS) provided. See patient instructions for more. Patient expresses understanding of diagnosis and treatment plan.

## 2018-08-10 NOTE — PROGRESS NOTES
Neisha Burgos  Identified pt with two pt identifiers(name and ). Chief Complaint   Patient presents with    Medication Refill     pt wants to discuss medication with provider    Labs     pt is not fasting: ate a banana       1. Have you been to the ER, urgent care clinic since your last visit? Hospitalized since your last visit? no    2. Have you seen or consulted any other health care providers outside of the [address] [place] [address] since your last visit? Include any pap smears or colon screening. no    Today's provider has been notified of reason for visit, vitals and flowsheets obtained on patients. Patient received paperwork for advance directive during previous visit but has not completed at this time     Reviewed record In preparation for visit, huddled with provider and have obtained necessary documentation      There are no preventive care reminders to display for this patient.     Wt Readings from Last 3 Encounters:   08/10/18 248 lb 9.6 oz (112.8 kg)   18 252 lb 12.8 oz (114.7 kg)   18 252 lb 14.4 oz (114.7 kg)     Temp Readings from Last 3 Encounters:   08/10/18 97.8 °F (36.6 °C) (Temporal)   18 98.3 °F (36.8 °C) (Temporal)   18 98.7 °F (37.1 °C) (Temporal)     BP Readings from Last 3 Encounters:   08/10/18 145/84   18 (!) 145/95   18 134/89     Pulse Readings from Last 3 Encounters:   08/10/18 80   18 81   18 65     Vitals:    08/10/18 0929   BP: 145/84   Pulse: 80   Resp: 17   Temp: 97.8 °F (36.6 °C)   TempSrc: Temporal   SpO2: 94%   Weight: 248 lb 9.6 oz (112.8 kg)   Height: 6' 4.5\" (1.943 m)   PainSc:   0 - No pain         Learning Assessment:  :     Learning Assessment 2015   PRIMARY LEARNER Patient   PRIMARY LANGUAGE ENGLISH   LEARNER PREFERENCE PRIMARY OTHER (COMMENT)   ANSWERED BY pt   RELATIONSHIP SELF       Depression Screening:  :     PHQ over the last two weeks 2018   Little interest or pleasure in doing things Several days   Feeling down, depressed, irritable, or hopeless Several days   Total Score PHQ 2 2   Trouble falling or staying asleep, or sleeping too much -   Feeling tired or having little energy -   Poor appetite, weight loss, or overeating -   Feeling bad about yourself - or that you are a failure or have let yourself or your family down -   Trouble concentrating on things such as school, work, reading, or watching TV -   Moving or speaking so slowly that other people could have noticed; or the opposite being so fidgety that others notice -   Thoughts of being better off dead, or hurting yourself in some way -   PHQ 9 Score -   How difficult have these problems made it for you to do your work, take care of your home and get along with others -         ADL Screening:  :     ADL Assessment 6/5/2018   Feeding yourself No Help Needed   Getting from bed to chair No Help Needed   Getting dressed No Help Needed   Bathing or showering No Help Needed   Walk across the room (includes cane/walker) No Help Needed   Using the telphone No Help Needed   Taking your medications No Help Needed   Preparing meals No Help Needed   Managing money (expenses/bills) No Help Needed   Moderately strenuous housework (laundry) No Help Needed   Shopping for personal items (toiletries/medicines) No Help Needed   Shopping for groceries No Help Needed   Driving No Help Needed   Climbing a flight of stairs No Help Needed   Getting to places beyond walking distances No Help Needed       Patient presents for Controlled Substance Prescription follow up. Last prescription:        COPY AND PASTE HERE    Bottle matches last prescription. pills remaining in bottle. Patient reports last dose taken at    printed. Yes   Urine collected. Controlled Substance Agreement letter printed. Printed on 6/25/2018            Medication reconciliation up to date and corrected with patient at this time.

## 2018-08-11 ENCOUNTER — TELEPHONE (OUTPATIENT)
Dept: FAMILY MEDICINE CLINIC | Age: 43
End: 2018-08-11

## 2018-08-11 DIAGNOSIS — N52.35 ERECTILE DYSFUNCTION FOLLOWING RADIATION THERAPY: Primary | ICD-10-CM

## 2018-08-11 LAB
APPEARANCE UR: CLEAR
BACTERIA UR CULT: NO GROWTH
BILIRUB UR QL STRIP: NEGATIVE
COLOR UR: YELLOW
GLUCOSE UR QL: NEGATIVE
HAV IGM SERPL QL IA: NEGATIVE
HBV CORE IGM SERPL QL IA: NEGATIVE
HBV SURFACE AG SERPL QL IA: NEGATIVE
HCV AB S/CO SERPL IA: <0.1 S/CO RATIO (ref 0–0.9)
HGB UR QL STRIP: NEGATIVE
HIV 1+2 AB+HIV1 P24 AG SERPL QL IA: NON REACTIVE
HSV1 IGG SER IA-ACNC: 21 INDEX (ref 0–0.9)
HSV2 IGG SER IA-ACNC: <0.91 INDEX (ref 0–0.9)
KETONES UR QL STRIP: NEGATIVE
LEUKOCYTE ESTERASE UR QL STRIP: NEGATIVE
MICRO URNS: ABNORMAL
NITRITE UR QL STRIP: NEGATIVE
PH UR STRIP: 5 [PH] (ref 5–7.5)
PROT UR QL STRIP: ABNORMAL
RPR SER QL: NON REACTIVE
SP GR UR: >=1.03 (ref 1–1.03)
UROBILINOGEN UR STRIP-MCNC: 0.2 MG/DL (ref 0.2–1)

## 2018-08-11 RX ORDER — SILDENAFIL 50 MG/1
50 TABLET, FILM COATED ORAL AS NEEDED
Qty: 30 TAB | Refills: 1 | Status: SHIPPED | OUTPATIENT
Start: 2018-08-11 | End: 2018-08-24 | Stop reason: ALTCHOICE

## 2018-08-12 LAB
C TRACH RRNA SPEC QL NAA+PROBE: NEGATIVE
N GONORRHOEA RRNA SPEC QL NAA+PROBE: NEGATIVE

## 2018-08-14 NOTE — PROGRESS NOTES
Your lab results have been reviewed and are as follows: Your STD testing results are as follows:  HIV is negative. Gonorrhea is negative. Chlamydia is negative. Syphilis is negative. Hepatitis A, B, and C are negative. Herpes Type I is positive (oral lesions). Herpes Type II is negative (genital lesions). Your urine culture is negative. Your urinalysis is normal but concentrated, increase fluid intake. Divya Gutierrez, MSN, MHA, FNP-BC

## 2018-08-17 ENCOUNTER — TELEPHONE (OUTPATIENT)
Dept: FAMILY MEDICINE CLINIC | Age: 43
End: 2018-08-17

## 2018-08-17 NOTE — TELEPHONE ENCOUNTER
Patient called Angie Champagne, two identifiers used to verify patient identity, he will come to office to pick letter up.

## 2018-08-17 NOTE — TELEPHONE ENCOUNTER
Writer called patient, message left to call office, if patient calls back need to advise that work letter is in office and is ready for . Thank you.

## 2018-08-17 NOTE — LETTER
NOTIFICATION RETURN TO WORK / SCHOOL 
 
8/17/2018 12:30 PM 
 
Mr. Jeannie Chow 
[facility] [address] [id number] To Whom It May Concern: 
Cari Doe is currently under the care of Bar[facility] Morillo return to work full duty on 8/18/2018 with no restrictions. 
   
If there are questions or concerns please have the patient contact our office. 
   
   
   
Divya Gutierrez  Cite Ja, MSN, MHA, FNP-BC

## 2018-08-17 NOTE — TELEPHONE ENCOUNTER
Pt needs an updated RTW note for Saturday, 8/18/2018. Ok to RTW then, updated letter printed and signed. Please notify the patient that he may come and pick this up at his convenience.

## 2018-08-24 ENCOUNTER — OFFICE VISIT (OUTPATIENT)
Dept: FAMILY MEDICINE CLINIC | Age: 43
End: 2018-08-24

## 2018-08-24 VITALS
BODY MASS INDEX: 29.61 KG/M2 | WEIGHT: 250.8 LBS | HEART RATE: 85 BPM | RESPIRATION RATE: 18 BRPM | OXYGEN SATURATION: 98 % | SYSTOLIC BLOOD PRESSURE: 141 MMHG | HEIGHT: 77 IN | TEMPERATURE: 97.1 F | DIASTOLIC BLOOD PRESSURE: 97 MMHG

## 2018-08-24 DIAGNOSIS — E03.9 HYPOTHYROIDISM, UNSPECIFIED TYPE: ICD-10-CM

## 2018-08-24 DIAGNOSIS — E55.9 VITAMIN D DEFICIENCY: ICD-10-CM

## 2018-08-24 DIAGNOSIS — F98.8 ADD (ATTENTION DEFICIT DISORDER) WITHOUT HYPERACTIVITY: ICD-10-CM

## 2018-08-24 DIAGNOSIS — E78.00 HYPERCHOLESTEROLEMIA: ICD-10-CM

## 2018-08-24 RX ORDER — LISDEXAMFETAMINE DIMESYLATE 30 MG/1
CAPSULE ORAL
Refills: 0 | COMMUNITY
Start: 2018-08-02 | End: 2018-08-24 | Stop reason: ALTCHOICE

## 2018-08-24 RX ORDER — DEXTROAMPHETAMINE SACCHARATE, AMPHETAMINE ASPARTATE, DEXTROAMPHETAMINE SULFATE AND AMPHETAMINE SULFATE 5; 5; 5; 5 MG/1; MG/1; MG/1; MG/1
20 TABLET ORAL 2 TIMES DAILY
Qty: 60 TAB | Refills: 0 | Status: CANCELLED | OUTPATIENT
Start: 2018-08-24

## 2018-08-24 RX ORDER — DEXTROAMPHETAMINE SACCHARATE, AMPHETAMINE ASPARTATE MONOHYDRATE, DEXTROAMPHETAMINE SULFATE AND AMPHETAMINE SULFATE 5; 5; 5; 5 MG/1; MG/1; MG/1; MG/1
20 CAPSULE, EXTENDED RELEASE ORAL DAILY
Qty: 30 CAP | Refills: 0 | Status: SHIPPED | OUTPATIENT
Start: 2018-09-23 | End: 2018-10-22 | Stop reason: ALTCHOICE

## 2018-08-24 RX ORDER — DEXTROAMPHETAMINE SACCHARATE, AMPHETAMINE ASPARTATE, DEXTROAMPHETAMINE SULFATE AND AMPHETAMINE SULFATE 5; 5; 5; 5 MG/1; MG/1; MG/1; MG/1
20 TABLET ORAL DAILY
Qty: 30 TAB | Refills: 0 | Status: SHIPPED | OUTPATIENT
Start: 2018-08-24 | End: 2018-09-23

## 2018-08-24 RX ORDER — DEXTROAMPHETAMINE SACCHARATE, AMPHETAMINE ASPARTATE, DEXTROAMPHETAMINE SULFATE AND AMPHETAMINE SULFATE 5; 5; 5; 5 MG/1; MG/1; MG/1; MG/1
20 TABLET ORAL DAILY
Qty: 30 TAB | Refills: 0 | Status: SHIPPED | OUTPATIENT
Start: 2018-10-23 | End: 2018-10-22 | Stop reason: SDUPTHER

## 2018-08-24 RX ORDER — ERGOCALCIFEROL 1.25 MG/1
50000 CAPSULE ORAL
Qty: 5 CAP | Refills: 2 | Status: SHIPPED | OUTPATIENT
Start: 2018-08-24 | End: 2018-10-22 | Stop reason: SDUPTHER

## 2018-08-24 RX ORDER — DEXTROAMPHETAMINE SACCHARATE, AMPHETAMINE ASPARTATE MONOHYDRATE, DEXTROAMPHETAMINE SULFATE AND AMPHETAMINE SULFATE 5; 5; 5; 5 MG/1; MG/1; MG/1; MG/1
20 CAPSULE, EXTENDED RELEASE ORAL DAILY
Qty: 30 CAP | Refills: 0 | Status: SHIPPED | OUTPATIENT
Start: 2018-10-23 | End: 2018-10-22 | Stop reason: ALTCHOICE

## 2018-08-24 RX ORDER — DEXTROAMPHETAMINE SACCHARATE, AMPHETAMINE ASPARTATE, DEXTROAMPHETAMINE SULFATE AND AMPHETAMINE SULFATE 5; 5; 5; 5 MG/1; MG/1; MG/1; MG/1
20 TABLET ORAL DAILY
Qty: 30 TAB | Refills: 0 | Status: SHIPPED | OUTPATIENT
Start: 2018-09-23 | End: 2018-10-22 | Stop reason: ALTCHOICE

## 2018-08-24 RX ORDER — ATORVASTATIN CALCIUM 20 MG/1
20 TABLET, FILM COATED ORAL DAILY
Qty: 30 TAB | Refills: 1 | Status: SHIPPED | OUTPATIENT
Start: 2018-08-24 | End: 2018-10-22 | Stop reason: SDUPTHER

## 2018-08-24 RX ORDER — LEVOTHYROXINE SODIUM 50 UG/1
50 TABLET ORAL
Qty: 30 TAB | Refills: 1 | Status: SHIPPED | OUTPATIENT
Start: 2018-08-24 | End: 2018-10-22 | Stop reason: SDUPTHER

## 2018-08-24 RX ORDER — DEXTROAMPHETAMINE SACCHARATE, AMPHETAMINE ASPARTATE MONOHYDRATE, DEXTROAMPHETAMINE SULFATE AND AMPHETAMINE SULFATE 5; 5; 5; 5 MG/1; MG/1; MG/1; MG/1
20 CAPSULE, EXTENDED RELEASE ORAL DAILY
Qty: 30 CAP | Refills: 0 | Status: SHIPPED | OUTPATIENT
Start: 2018-08-24 | End: 2018-09-23

## 2018-08-24 NOTE — PATIENT INSTRUCTIONS
Learning About High Cholesterol  What is high cholesterol? Cholesterol is a type of fat in your blood. It is needed for many body functions, such as making new cells. Cholesterol is made by your body. It also comes from food you eat. If you have too much cholesterol, it starts to build up in your arteries. This is called hardening of the arteries, or atherosclerosis. High cholesterol raises your risk of a heart attack and stroke. There are different types of cholesterol. LDL is the \"bad\" cholesterol. High LDL can raise your risk for heart disease, heart attack, and stroke. HDL is the \"good\" cholesterol. High HDL is linked with a lower risk for heart disease, heart attack, and stroke. Your cholesterol levels help your doctor find out your risk for having a heart attack or stroke. How can you prevent high cholesterol? A heart-healthy lifestyle can help you prevent high cholesterol. This lifestyle helps lower your risk for a heart attack and stroke. · Eat heart-healthy foods. ¨ Eat fruits, vegetables, whole grains (like oatmeal), dried beans and peas, nuts and seeds, soy products (like tofu), and fat-free or low-fat dairy products. ¨ Replace butter, margarine, and hydrogenated or partially hydrogenated oils with olive and canola oils. (Canola oil margarine without trans fat is fine.)  ¨ Replace red meat with fish, poultry, and soy protein (like tofu). ¨ Limit processed and packaged foods like chips, crackers, and cookies. · Be active. Exercise can improve your cholesterol level. Get at least 30 minutes of exercise on most days of the week. Walking is a good choice. You also may want to do other activities, such as running, swimming, cycling, or playing tennis or team sports. · Stay at a healthy weight. Lose weight if you need to. · Don't smoke. If you need help quitting, talk to your doctor about stop-smoking programs and medicines. These can increase your chances of quitting for good.   How is high cholesterol treated? The goal of treatment is to reduce your chances of having a heart attack or stroke. The goal is not to lower your cholesterol numbers only. · You may make lifestyle changes, such as eating healthy foods, not smoking, losing weight, and being more active. · You may have to take medicine. Follow-up care is a key part of your treatment and safety. Be sure to make and go to all appointments, and call your doctor if you are having problems. It's also a good idea to know your test results and keep a list of the medicines you take. Where can you learn more? Go to http://charissa-maria elena.info/. Enter P503 in the search box to learn more about \"Learning About High Cholesterol. \"  Current as of: May 10, 2017  Content Version: 11.7  © 4638-7549 [employer] Care instructions adapted under license by [employer] (which disclaims liability or warranty for this information). If you have questions about a medical condition or this instruction, always ask your healthcare professional. [place] [address] any warranty or liability for your use of this information. Hypothyroidism: Care Instructions  Your Care Instructions    You have hypothyroidism, which means that your body is not making enough thyroid hormone. This hormone helps your body use energy. If your thyroid level is low, you may feel tired, be constipated, have an increase in your blood pressure, or have dry skin or memory problems. You may also get cold easily, even when it is warm. Women with low thyroid levels may have heavy menstrual periods. A blood test to find your thyroid-stimulating hormone (TSH) level is used to check for hypothyroidism. A high TSH level may mean that you have low thyroid. When your body is not making enough thyroid hormone, TSH levels rise in an effort to make the body produce more. The treatment for hypothyroidism is to take thyroid hormone pills.  You should start to feel better in 1 to 2 weeks. But it can take several months to see changes in the TSH level. You will need regular visits with your doctor to make sure you have the right dose of medicine. Most people need treatment for the rest of their lives. You will need to see your doctor regularly to have blood tests and to make sure you are doing well. Follow-up care is a key part of your treatment and safety. Be sure to make and go to all appointments, and call your doctor if you are having problems. It's also a good idea to know your test results and keep a list of the medicines you take. How can you care for yourself at home? · Take your thyroid hormone medicine exactly as prescribed. Call your doctor if you think you are having a problem with your medicine. Most people do not have side effects if they take the right amount of medicine regularly. ¨ Take the medicine 30 minutes before breakfast, and do not take it with calcium, vitamins, or iron. ¨ Do not take extra doses of your thyroid medicine. It will not help you get better any faster, and it may cause side effects. ¨ If you forget to take a dose, do NOT take a double dose of medicine. Take your usual dose the next day. · Tell your doctor about all prescription, herbal, or over-the-counter products you take. · Take care of yourself. Eat a healthy diet, get enough sleep, and get regular exercise. When should you call for help? Call 911 anytime you think you may need emergency care. For example, call if:    · You passed out (lost consciousness).     · You have severe trouble breathing.     · You have a very slow heartbeat (less than 60 beats a minute).     · You have a low body temperature (95°F or below).    Call your doctor now or seek immediate medical care if:    · You feel tired, sluggish, or weak.     · You have trouble remembering things or concentrating.     · You do not begin to feel better 2 weeks after starting your medicine.  Watch closely for changes in your health, and be sure to contact your doctor if you have any problems. Where can you learn more? Go to http://charissa-maria elena.info/. Enter W316 in the search box to learn more about \"Hypothyroidism: Care Instructions. \"  Current as of: May 12, 2017  Content Version: 11.7  © 7451-5460 [employer]. Care instructions adapted under license by [employer] (which disclaims liability or warranty for this information). If you have questions about a medical condition or this instruction, always ask your healthcare professional. [address] any warranty or liability for your use of this information.

## 2018-08-24 NOTE — PROGRESS NOTES
Chief Complaint   Patient presents with    Hypertension     rm 4/ medication refills         HPI:  The patient is a 40 y.o. male who presents today for a follow up appointment. No hospital, ER or specialist visits since last primary care visit except as noted below. ADD:  Pt restarted on Adderall at his last visit. He reports his ADD symptoms were well controlled but now he feels it makes him jittery. He would like to try a different ADD medication, he was changed to Vyvanse on 8/1/2018. He does not feel it works as well as Adderall. Now he is taking Adderall which he feels is wearing off. He has returned to work and his working very long shifts. He would like to discuss another option. He has continues to struggle with being symptomatic with ADD as well as depression as well as returning to work. These variables have made effective treatment options challenging thus far. Depression:  He has been taking Wellbutrin BID with great improvement. He does report fatigue, states he previous PCP gave him a Vit B12 shot with good effectiveness. He reports considerably worsening depression this week, he reports 2-3 episodes of SI, he denies a plan and felt \"it would be ok if he did not wake up tomorrow\". At his last visit on 6/29/2018, he was started on Lamictal 25mg daily. He has now increased to 50mg daily and  reports his depressive symptoms are continuing to improve. He ran out of his Lamictal for approximately 1 week. He felt the Wellbutrin and Lamictal were managing his symptoms well. He had diarrhea and felt the Lamictal was the cause of this, therefore he stopped the Lamictal.  He reports his diarrhea resolved. He is now only taking Wellbutrin and feels his depressive symptoms are well managed. Pt returned to work on Saturday, 8/18/2018. He is doing well and feels his depressive symptoms are well controlled.     Review of Systems  A comprehensive review of systems was negative except for that written in the HPI.     Patient Active Problem List   Diagnosis Code    Adjustment reaction with anxiety and depression F43.23    Vitamin D deficiency E55.9    Hypercholesterolemia E78.00    Hypertension I10    GERD (gastroesophageal reflux disease) K21.9    ADD (attention deficit disorder) without hyperactivity F98.8    Moderate major depression ([facility] F32.1    Vitamin B12 deficiency E53.8    Hypothyroidism E03.9       Past Medical History:   Diagnosis Date    ADD (attention deficit disorder) without hyperactivity 6/7/2018    Adjustment reaction with anxiety and depression     Attention deficit hyperactivity disorder (ADHD), predominantly inattentive type 6/7/2018    [facility]     test[facility]     Cancer ([facility] 2010    Testicular    Community acquired pneumonia     Depression     GERD (gastroesophageal reflux disease)     Headache     Hypercholesterolemia     Hypertension     Vitamin D deficiency        Past Surgical History:   Procedure Laterality Date    HX ORCHIECTOMY Right 2010    HX UROLOGICAL      removed on e testicle    HX WISDOM TEETH EXTRACTION  1996       Social History   Substance Use Topics    Smoking status: Former Smoker     Packs/day: 1.00     Years: 8.00     Types: Cigarettes     Quit date: 2008    Smokeless tobacco: Never Used    Alcohol use 6.0 oz/week     24 Cans of beer per week       Family History   Problem Relation Age of Onset    Cancer Mother      Ovarian    Hypertension Mother     Cancer Father      Throat    Hypertension Father     Arrhythmia Brother      Afib    No Known Problems Maternal Grandmother     No Known Problems Maternal Grandfather     No Known Problems Paternal Grandmother     No Known Problems Paternal Grandfather        Outpatient Prescriptions Marked as Taking for the 8/24/18 encounter (Office Visit) with Ann Segovia NP   Medication Sig Dispense Refill    ergocalciferol (ERGOCALCIFEROL) 50,000 unit capsule Take 1 Cap by mouth every seven (7) days. 5 Cap 2    atorvastatin (LIPITOR) 20 mg tablet Take 1 Tab by mouth daily. 30 Tab 1    amphetamine-dextroamphetamine XR (ADDERALL XR) 20 mg XR capsule Take 1 Cap (20 mg total) by mouth dailyEarliest Fill Date: 8/24/18. Max Daily Amount: 20 mg 30 Cap 0    [START ON 9/23/2018] amphetamine-dextroamphetamine XR (ADDERALL XR) 20 mg XR capsule Take 1 Cap (20 mg total) by mouth dailyEarliest Fill Date: 9/23/18. Max Daily Amount: 20 mg 30 Cap 0    [START ON 10/23/2018] amphetamine-dextroamphetamine XR (ADDERALL XR) 20 mg XR capsule Take 1 Cap (20 mg total) by mouth dailyEarliest Fill Date: 10/23/18. Max Daily Amount: 20 mg 30 Cap 0    dextroamphetamine-amphetamine (ADDERALL) 20 mg tablet Take 1 Tab (20 mg total) by mouth dailyEarliest Fill Date: 8/24/18. Max Daily Amount: 20 mg 30 Tab 0    [START ON 9/23/2018] dextroamphetamine-amphetamine (ADDERALL) 20 mg tablet Take 1 Tab (20 mg total) by mouth dailyEarliest Fill Date: 9/23/18. Max Daily Amount: 20 mg 30 Tab 0    [START ON 10/23/2018] dextroamphetamine-amphetamine (ADDERALL) 20 mg tablet Take 1 Tab (20 mg total) by mouth dailyEarliest Fill Date: 10/23/18. Max Daily Amount: 20 mg 30 Tab 0    levothyroxine (SYNTHROID) 50 mcg tablet Take 1 Tab by mouth Daily (before breakfast). 30 Tab 1    buPROPion SR (WELLBUTRIN SR) 150 mg SR tablet Take 1 Tab by mouth two (2) times a day. 60 Tab 0    Syringe with Needle, Safety (BD INTE[employer]RA SYRINGE) 3 mL 25 gauge x 5/8\" syrg Use as directed monthly 3 Pen Needle 1    cyanocobalamin (VITAMIN B12) 1,000 mcg/mL injection 1 mL by IntraMUSCular route every thirty (30) days. 3 Vial 1    lamoTRIgine (LAMICTAL) 25 mg tablet Take 2 Tabs by mouth two (2) times a day. 120 Tab 2    multivitamin (ONE A DAY) tablet Take 1 Tab by mouth daily.          No Known Allergies    PE:  Visit Vitals    BP (!) 141/97    Pulse 85    Temp 97.1 °F (36.2 °C) (Oral)    Resp 18    Ht 6' 4.5\" (1.943 m)    Wt 250 lb 12.8 oz (113.8 kg)    SpO2 98%    BMI 30.13 kg/m2     Gen: alert, oriented, no acute distress  Head: normocephalic, atraumatic  Ears: external auditory canals clear, TMs without erythema or effusion  Eyes: pupils equal round reactive to light, sclera clear, conjunctiva clear  Oral: moist mucus membranes, no oral lesions, no pharyngeal inflammation or exudate  Neck: symmetric normal sized thyroid, no carotid bruits, no jugular vein distention  Resp: no increase work of breathing, lungs clear to ausculation bilaterally, no wheezing, rales or rhonchi  CV: S1, S2 normal.  No murmurs, rubs, or gallops. Abd: soft, not tender, not distended. No hepatosplenomegaly. Normal bowel sounds. No hernias. No abdominal or renal bruits. Neuro: cranial nerves intact, normal strength and movement in all extremities, reflexes and sensation intact and symmetric. Skin: no lesion or rash  Extremities: no cyanosis or edema    Assessment/Plan:    ICD-10-CM ICD-9-CM    1. ADD (attention deficit disorder) without hyperactivity F98.8 314.00 amphetamine-dextroamphetamine XR (ADDERALL XR) 20 mg XR capsule      amphetamine-dextroamphetamine XR (ADDERALL XR) 20 mg XR capsule      amphetamine-dextroamphetamine XR (ADDERALL XR) 20 mg XR capsule      dextroamphetamine-amphetamine (ADDERALL) 20 mg tablet      dextroamphetamine-amphetamine (ADDERALL) 20 mg tablet      dextroamphetamine-amphetamine (ADDERALL) 20 mg tablet   2. Vitamin D deficiency E55.9 268.9 ergocalciferol (ERGOCALCIFEROL) 50,000 unit capsule   3. Hypercholesterolemia E78.00 272.0 atorvastatin (LIPITOR) 20 mg tablet   4. Hypothyroidism, unspecified type E03.9 244.9 levothyroxine (SYNTHROID) 50 mcg tablet     Follow-up Disposition:  Return in about 4 weeks (around 9/21/2018) for Medication Check, ADHD/ADD, Hypothyroidism, XOL, Vitamin D deficiency.   lab results and schedule of future lab studies reviewed with patient  reviewed diet, exercise and weight control  reviewed medications and side effects in detail    Labs need to be checked:  TSH 9/2018  Vit D 11/2018  XOL 12/2018     Controlled substance plan and safety assessment:  Medication: Adderall  MME/day: N/A                        >= 50? N/A                        >= 120? N/A             Naloxone Rx? N/A   appropriate and last checked on: 8/24/2018  Last Urine Toxicology: 6/27/2018, appropriate  Treatment agreement was signed by pt and myself on: 6/25/2018     24 hour opioid dose >120mg morphine equivalent/day:  [] Yes   [x] No  Benzodiazepines:  [] Yes   [x] No  Sleep apnea:  [] Yes   [x] No  Urine Toxicology Testing within last 6 months:  [x] Yes   [] No  History of or new aberrant medication taking behaviors:  [] Yes   [x] No     Controlled Substance Refills given  Date prescription signed by me  medication  Amount Refills   08/24/18 Adderall 20mg XR 30 0   08/24/18 Adderall 20mg QD 30 0          Health Maintenance reviewed - UTD. Recommended healthy diet low in carbohydrates, fats, sodium and cholesterol. Recommended regular cardiovascular exercise 3-6 times per week for 30-60 minutes daily. Chart is reviewed and updated today in the office. Records requested for other providers patient has seen and is currently seeing. Patient was offered a choice/choices in the treatment plan today. Patient expresses understanding of the plan and agrees with recommendations. Verbal and written instructions (see AVS) provided. See patient instructions for more. Patient expresses understanding of diagnosis and treatment plan.

## 2018-08-24 NOTE — PROGRESS NOTES
Chadwick Lai  Identified pt with two pt identifiers(name and ). Chief Complaint   Patient presents with    Hypertension     rm 4/ medication refills       1. Have you been to the ER, urgent care clinic since your last visit? no  Hospitalized since your last visit? No    2. Have you seen or consulted any other health care providers outside of the [address] [place] [address] since your last visit? Include any pap smears or colon screening. No    Today's provider has been notified of reason for visit, vitals and flowsheets obtained on patients. Reviewed record In preparation for visit, huddled with provider and have obtained necessary documentation      There are no preventive care reminders to display for this patient.     Wt Readings from Last 3 Encounters:   18 250 lb 12.8 oz (113.8 kg)   08/10/18 248 lb 9.6 oz (112.8 kg)   18 252 lb 12.8 oz (114.7 kg)     Temp Readings from Last 3 Encounters:   18 97.1 °F (36.2 °C) (Oral)   08/10/18 97.8 °F (36.6 °C) (Temporal)   18 98.3 °F (36.8 °C) (Temporal)     BP Readings from Last 3 Encounters:   18 (!) 141/97   08/10/18 145/84   18 (!) 145/95     Pulse Readings from Last 3 Encounters:   18 85   08/10/18 80   18 81     Vitals:    18 1615   BP: (!) 141/97   Pulse: 85   Resp: 18   Temp: 97.1 °F (36.2 °C)   TempSrc: Oral   SpO2: 98%   Weight: 250 lb 12.8 oz (113.8 kg)   Height: 6' 4.5\" (1.943 m)   PainSc:   0 - No pain         Learning Assessment:  :     Learning Assessment 2015   PRIMARY LEARNER Patient   PRIMARY LANGUAGE ENGLISH   LEARNER PREFERENCE PRIMARY OTHER (COMMENT)   ANSWERED BY pt   RELATIONSHIP SELF       Depression Screening:  :     PHQ over the last two weeks 2018   Little interest or pleasure in doing things Not at all   Feeling down, depressed, irritable, or hopeless Not at all   Total Score PHQ 2 0   Trouble falling or staying asleep, or sleeping too much -   Feeling tired or having little energy -   Poor appetite, weight loss, or overeating -   Feeling bad about yourself - or that you are a failure or have let yourself or your family down -   Trouble concentrating on things such as school, work, reading, or watching TV -   Moving or speaking so slowly that other people could have noticed; or the opposite being so fidgety that others notice -   Thoughts of being better off dead, or hurting yourself in some way -   PHQ 9 Score -   How difficult have these problems made it for you to do your work, take care of your home and get along with others -       Fall Risk Assessment:  :     No flowsheet data found. Abuse Screening:  :     Abuse Screening Questionnaire 8/24/2018   Do you ever feel afraid of your partner? N   Are you in a relationship with someone who physically or mentally threatens you? N   Is it safe for you to go home? Y       ADL Screening:  :     ADL Assessment 6/5/2018   Feeding yourself No Help Needed   Getting from bed to chair No Help Needed   Getting dressed No Help Needed   Bathing or showering No Help Needed   Walk across the room (includes cane/walker) No Help Needed   Using the telphone No Help Needed   Taking your medications No Help Needed   Preparing meals No Help Needed   Managing money (expenses/bills) No Help Needed   Moderately strenuous housework (laundry) No Help Needed   Shopping for personal items (toiletries/medicines) No Help Needed   Shopping for groceries No Help Needed   Driving No Help Needed   Climbing a flight of stairs No Help Needed   Getting to places beyond walking distances No Help Needed         Patient presents for Controlled Substance Prescription follow up. Last prescription:      Bottle matches last prescription. 0 pills remaining in bottle. Patient reports last dose taken at on8/24/18 at 3:00 pm  printed. Urine collected. Controlled Substance Agreement letter printed.           Medication reconciliation up to date and corrected with patient at this time.

## 2018-08-24 NOTE — MR AVS SNAPSHOT
[address] [place] 
[address] [place] [address] 
[phone] Patient: Aguilar Thompson MRN: [id number] :1975 Visit Information Date & Time Provider Department Dept. Phone Encounter #  
 2018  4:10 PM Osmel Kaufman NP [facility] [phone] [id number] Follow-up Instructions Return in about 4 weeks (around 2018) for Medication Check, ADHD/ADD, Hypothyroidism, XOL, Vitamin D deficiency. Upcoming Health Maintenance Date Due Influenza Age 5 to Adult 2018* Pneumococcal 19-64 Highest Risk (1 of 3 - PCV13) 2018* DTaP/Tdap/Td series (1 - Tdap) 2019* *Topic was postponed. The date shown is not the original due date. Allergies as of 2018  Review Complete On: 2018 By: Osmel Kaufman NP No Known Allergies Current Immunizations  Reviewed on 8/10/2018 No immunizations on file. Not reviewed this visit You Were Diagnosed With   
  
 Codes Comments ADD (attention deficit disorder) without hyperactivity     ICD-10-CM: F98.8 ICD-9-CM: 314.00 Vitamin D deficiency     ICD-10-CM: E55.9 ICD-9-CM: 268.9 Hypercholesterolemia     ICD-10-CM: E78.00 ICD-9-CM: 272.0 Hypothyroidism, unspecified type     ICD-10-CM: E03.9 ICD-9-CM: 711. 9 Vitals BP Pulse Temp Resp Height(growth percentile) Weight(growth percentile) (!) 141/97 85 97.1 °F (36.2 °C) (Oral) 18 6' 4.5\" (1.943 m) 250 lb 12.8 oz (113.8 kg) SpO2 BMI Smoking Status 98% 30.13 kg/m2 Former Smoker BMI and BSA Data Body Mass Index Body Surface Area  
 30.13 kg/m 2 2.48 m 2 Preferred Pharmacy Pharmacy Name Phone [facility] [address] AT  [address] [phone] Your Updated Medication List  
  
   
This list is accurate as of 18  5:04 PM.  Always use your most recent med list.  
  
  
  
  
 * amphetamine-dextroamphetamine XR 20 mg XR capsule Commonly known as:  ADDERALL XR Take 1 Cap (20 mg total) by mouth dailyEarliest Fill Date: 18. Max Daily Amount: 20 mg  
  
 * dextroamphetamine-amphetamine 20 mg tablet Commonly known as:  ADDERALL Take 1 Tab (20 mg total) by mouth dailyEarliest Fill Date: 18. Max Daily Amount: 20 mg  
  
 * amphetamine-dextroamphetamine XR 20 mg XR capsule Commonly known as:  ADDERALL XR Take 1 Cap (20 mg total) by mouth dailyEarliest Fill Date: 18. Max Daily Amount: 20 mg  
Start taking on:  2018 * dextroamphetamine-amphetamine 20 mg tablet Commonly known as:  ADDERALL Take 1 Tab (20 mg total) by mouth dailyEarliest Fill Date: 18. Max Daily Amount: 20 mg  
Start taking on:  2018 * amphetamine-dextroamphetamine XR 20 mg XR capsule Commonly known as:  ADDERALL XR Take 1 Cap (20 mg total) by mouth dailyEarliest Fill Date: 10/23/18. Max Daily Amount: 20 mg  
Start taking on:  10/23/2018 * dextroamphetamine-amphetamine 20 mg tablet Commonly known as:  ADDERALL Take 1 Tab (20 mg total) by mouth dailyEarliest Fill Date: 10/23/18. Max Daily Amount: 20 mg  
Start taking on:  10/23/2018  
  
 atorvastatin 20 mg tablet Commonly known as:  LIPITOR Take 1 Tab by mouth daily. buPROPion  mg SR tablet Commonly known as:  Hanane Caleb Take 1 Tab by mouth two (2) times a day. cyanocobalamin 1,000 mcg/mL injection Commonly known as:  VITAMIN B12  
1 mL by IntraMUSCular route every thirty (30) days. ergocalciferol 50,000 unit capsule Commonly known as:  ERGOCALCIFEROL Take 1 Cap by mouth every seven (7) days. lamoTRIgine 25 mg tablet Commonly known as: LaMICtal  
Take 2 Tabs by mouth two (2) times a day. levothyroxine 50 mcg tablet Commonly known as:  SYNTHROID Take 1 Tab by mouth Daily (before breakfast). multivitamin tablet Commonly known as:  ONE A DAY Take 1 Tab by mouth daily. Syringe with Needle, Safety 3 mL 25 gauge x 5/8\" Syrg Commonly known as:  BD [employer] SYRINGE Use as directed monthly * Notice: This list has 6 medication(s) that are the same as other medications prescribed for you. Read the directions carefully, and ask your doctor or other care provider to review them with you. Prescriptions Printed Refills  
 amphetamine-dextroamphetamine XR (ADDERALL XR) 20 mg XR capsule 0 Sig: Take 1 Cap (20 mg total) by mouth dailyEarliest Fill Date: 8/24/18. Max Daily Amount: 20 mg  
 Class: Print Route: Oral  
 amphetamine-dextroamphetamine XR (ADDERALL XR) 20 mg XR capsule 0 Starting on: 9/23/2018 Sig: Take 1 Cap (20 mg total) by mouth dailyEarliest Fill Date: 9/23/18. Max Daily Amount: 20 mg  
 Class: Print Route: Oral  
 amphetamine-dextroamphetamine XR (ADDERALL XR) 20 mg XR capsule 0 Starting on: 10/23/2018 Sig: Take 1 Cap (20 mg total) by mouth dailyEarliest Fill Date: 10/23/18. Max Daily Amount: 20 mg  
 Class: Print Route: Oral  
 dextroamphetamine-amphetamine (ADDERALL) 20 mg tablet 0 Sig: Take 1 Tab (20 mg total) by mouth dailyEarliest Fill Date: 8/24/18. Max Daily Amount: 20 mg  
 Class: Print Route: Oral  
 dextroamphetamine-amphetamine (ADDERALL) 20 mg tablet 0 Starting on: 9/23/2018 Sig: Take 1 Tab (20 mg total) by mouth dailyEarliest Fill Date: 9/23/18. Max Daily Amount: 20 mg  
 Class: Print Route: Oral  
 dextroamphetamine-amphetamine (ADDERALL) 20 mg tablet 0 Starting on: 10/23/2018 Sig: Take 1 Tab (20 mg total) by mouth dailyEarliest Fill Date: 10/23/18. Max Daily Amount: 20 mg  
 Class: Print Route: Oral  
  
Prescriptions Sent to Pharmacy Refills  
 ergocalciferol (ERGOCALCIFEROL) 50,000 unit capsule 2 Sig: Take 1 Cap by mouth every seven (7) days.   
 Class: Normal  
 Pharmacy: [facility],  [place] [address] Ph #: [phone] Route: Oral  
 atorvastatin (LIPITOR) 20 mg tablet 1 Sig: Take 1 Tab by mouth daily. Class: Normal  
 Pharmacy: [place] [address] Ph #: [phone] Route: Oral  
 levothyroxine (SYNTHROID) 50 mcg tablet 1 Sig: Take 1 Tab by mouth Daily (before breakfast). Class: Normal  
 Pharmacy: [facility] [place] [address] Ph #: [phone] Route: Oral  
  
Follow-up Instructions Return in about 4 weeks (around 9/21/2018) for Medication Check, ADHD/ADD, Hypothyroidism, XOL, Vitamin D deficiency. Patient Instructions Learning About High Cholesterol What is high cholesterol? Cholesterol is a type of fat in your blood. It is needed for many body functions, such as making new cells. Cholesterol is made by your body. It also comes from food you eat. If you have too much cholesterol, it starts to build up in your arteries. This is called hardening of the arteries, or atherosclerosis. High cholesterol raises your risk of a heart attack and stroke. There are different types of cholesterol. LDL is the \"bad\" cholesterol. High LDL can raise your risk for heart disease, heart attack, and stroke. HDL is the \"good\" cholesterol. High HDL is linked with a lower risk for heart disease, heart attack, and stroke. Your cholesterol levels help your doctor find out your risk for having a heart attack or stroke. How can you prevent high cholesterol? A heart-healthy lifestyle can help you prevent high cholesterol. This lifestyle helps lower your risk for a heart attack and stroke. · Eat heart-healthy foods.  
¨ Eat fruits, vegetables, whole grains (like oatmeal), dried beans and peas, nuts and seeds, soy products (like tofu), and fat-free or low-fat dairy products. ¨ Replace butter, margarine, and hydrogenated or partially hydrogenated oils with olive and canola oils. (Canola oil margarine without trans fat is fine.) ¨ Replace red meat with fish, poultry, and soy protein (like tofu). ¨ Limit processed and packaged foods like chips, crackers, and cookies. · Be active. Exercise can improve your cholesterol level. Get at least 30 minutes of exercise on most days of the week. Walking is a good choice. You also may want to do other activities, such as running, swimming, cycling, or playing tennis or team sports. · Stay at a healthy weight. Lose weight if you need to. · Don't smoke. If you need help quitting, talk to your doctor about stop-smoking programs and medicines. These can increase your chances of quitting for good. How is high cholesterol treated? The goal of treatment is to reduce your chances of having a heart attack or stroke. The goal is not to lower your cholesterol numbers only. · You may make lifestyle changes, such as eating healthy foods, not smoking, losing weight, and being more active. · You may have to take medicine. Follow-up care is a key part of your treatment and safety. Be sure to make and go to all appointments, and call your doctor if you are having problems. It's also a good idea to know your test results and keep a list of the medicines you take. Where can you learn more? Go to http://charissa-maria elena.info/. Enter F502 in the search box to learn more about \"Learning About High Cholesterol. \" Current as of: May 10, 2017 Content Version: 11.7 © 7449-2710 [employer]. Care instructions adapted under license by [employer] (which disclaims liability or warranty for this information). If you have questions about a medical condition or this instruction, always ask your healthcare professional. [address] any warranty or liability for your use of this information. Hypothyroidism: Care Instructions Your Care Instructions You have hypothyroidism, which means that your body is not making enough thyroid hormone. This hormone helps your body use energy. If your thyroid level is low, you may feel tired, be constipated, have an increase in your blood pressure, or have dry skin or memory problems. You may also get cold easily, even when it is warm. Women with low thyroid levels may have heavy menstrual periods. A blood test to find your thyroid-stimulating hormone (TSH) level is used to check for hypothyroidism. A high TSH level may mean that you have low thyroid. When your body is not making enough thyroid hormone, TSH levels rise in an effort to make the body produce more. The treatment for hypothyroidism is to take thyroid hormone pills. You should start to feel better in 1 to 2 weeks. But it can take several months to see changes in the TSH level. You will need regular visits with your doctor to make sure you have the right dose of medicine. Most people need treatment for the rest of their lives. You will need to see your doctor regularly to have blood tests and to make sure you are doing well. Follow-up care is a key part of your treatment and safety. Be sure to make and go to all appointments, and call your doctor if you are having problems. It's also a good idea to know your test results and keep a list of the medicines you take. How can you care for yourself at home? · Take your thyroid hormone medicine exactly as prescribed. Call your doctor if you think you are having a problem with your medicine. Most people do not have side effects if they take the right amount of medicine regularly. ¨ Take the medicine 30 minutes before breakfast, and do not take it with calcium, vitamins, or iron. ¨ Do not take extra doses of your thyroid medicine. It will not help you get better any faster, and it may cause side effects. ¨ If you forget to take a dose, do NOT take a double dose of medicine. Take your usual dose the next day. · Tell your doctor about all prescription, herbal, or over-the-counter products you take. · Take care of yourself. Eat a healthy diet, get enough sleep, and get regular exercise. When should you call for help? Call 911 anytime you think you may need emergency care. For example, call if: 
  · You passed out (lost consciousness).  
  · You have severe trouble breathing.  
  · You have a very slow heartbeat (less than 60 beats a minute).  
  · You have a low body temperature (95°F or below).  
 Call your doctor now or seek immediate medical care if: 
  · You feel tired, sluggish, or weak.  
  · You have trouble remembering things or concentrating.  
  · You do not begin to feel better 2 weeks after starting your medicine.  
 Watch closely for changes in your health, and be sure to contact your doctor if you have any problems. Where can you learn more? Go to http://charissa-maria elena.info/. Enter W296 in the search box to learn more about \"Hypothyroidism: Care Instructions. \" Current as of: May 12, 2017 Content Version: 11.7 © 3003-6981 [email or web address]. Care instructions adapted under license by [employer] (which disclaims liability or warranty for this information). If you have questions about a medical condition or this instruction, always ask your healthcare professional. [place] [address] any warranty or liability for your use of this information. Introducing [facility]! [employer] introduces [employer] patient portal. Now you can access parts of your medical record, email your doctor's office, and request medication refills online. 1. In your internet browser, go to [email or web address] 2. Click on the First Time User? Click Here link in the Sign In box. You will see the New Member Sign Up page. 3. Enter your Cuipo Access Code exactly as it appears below. You will not need to use this code after youve completed the sign-up process. If you do not sign up before the expiration date, you must request a new code. · Cuipo Access Code: [email or web address] Expires: 9/10/2018  4:04 PM 
 
4. Enter the last four digits of your Social Security Number (xxxx) and Date of Birth (mm/dd/yyyy) as indicated and click Submit. You will be taken to the next sign-up page. 5. Create a Cuipo ID. This will be your Cuipo login ID and cannot be changed, so think of one that is secure and easy to remember. 6. Create a Cuipo password. You can change your password at any time. 7. Enter your Password Reset Question and Answer. This can be used at a later time if you forget your password. 8. Enter your e-mail address. You will receive e-mail notification when new information is available in [address]. 9. Click Sign Up. You can now view and download portions of your medical record. 10. Click the Download Summary menu link to download a portable copy of your medical information. If you have questions, please visit the Frequently Asked Questions section of the Cuipo website. Remember, Cuipo is NOT to be used for urgent needs. For medical emergencies, dial 911. Now available from your iPhone and Android! Please provide this summary of care documentation to your next provider. Your primary care clinician is listed as Alex Luna. Jakob Zapien. If you have any questions after today's visit, please call [phone].

## 2018-10-02 DIAGNOSIS — F43.23 ADJUSTMENT REACTION WITH ANXIETY AND DEPRESSION: ICD-10-CM

## 2018-10-03 RX ORDER — BUPROPION HYDROCHLORIDE 150 MG/1
TABLET, EXTENDED RELEASE ORAL
Qty: 60 TAB | Refills: 0 | Status: SHIPPED | OUTPATIENT
Start: 2018-10-03 | End: 2018-10-22 | Stop reason: SDUPTHER

## 2018-10-22 ENCOUNTER — OFFICE VISIT (OUTPATIENT)
Dept: FAMILY MEDICINE CLINIC | Age: 43
End: 2018-10-22

## 2018-10-22 VITALS
SYSTOLIC BLOOD PRESSURE: 124 MMHG | BODY MASS INDEX: 29.68 KG/M2 | HEART RATE: 73 BPM | DIASTOLIC BLOOD PRESSURE: 82 MMHG | RESPIRATION RATE: 19 BRPM | HEIGHT: 77 IN | OXYGEN SATURATION: 98 % | WEIGHT: 251.4 LBS | TEMPERATURE: 98.7 F

## 2018-10-22 DIAGNOSIS — F43.23 ADJUSTMENT REACTION WITH ANXIETY AND DEPRESSION: ICD-10-CM

## 2018-10-22 DIAGNOSIS — E53.8 VITAMIN B12 DEFICIENCY: ICD-10-CM

## 2018-10-22 DIAGNOSIS — F98.8 ADD (ATTENTION DEFICIT DISORDER) WITHOUT HYPERACTIVITY: Primary | ICD-10-CM

## 2018-10-22 DIAGNOSIS — E03.9 HYPOTHYROIDISM, UNSPECIFIED TYPE: ICD-10-CM

## 2018-10-22 DIAGNOSIS — G89.29 OTHER CHRONIC PAIN: ICD-10-CM

## 2018-10-22 DIAGNOSIS — E55.9 VITAMIN D DEFICIENCY: ICD-10-CM

## 2018-10-22 DIAGNOSIS — E78.00 HYPERCHOLESTEROLEMIA: ICD-10-CM

## 2018-10-22 PROBLEM — E07.9 THYROID DISEASE: Status: ACTIVE | Noted: 2017-01-01

## 2018-10-22 RX ORDER — ATORVASTATIN CALCIUM 20 MG/1
20 TABLET, FILM COATED ORAL DAILY
Qty: 90 TAB | Refills: 1 | Status: SHIPPED | OUTPATIENT
Start: 2018-10-22 | End: 2019-04-22 | Stop reason: SDUPTHER

## 2018-10-22 RX ORDER — LAMOTRIGINE 25 MG/1
25 TABLET ORAL DAILY
Qty: 90 TAB | Refills: 1 | Status: SHIPPED | OUTPATIENT
Start: 2018-10-22 | End: 2019-06-25 | Stop reason: ALTCHOICE

## 2018-10-22 RX ORDER — DEXTROAMPHETAMINE SACCHARATE, AMPHETAMINE ASPARTATE MONOHYDRATE, DEXTROAMPHETAMINE SULFATE AND AMPHETAMINE SULFATE 7.5; 7.5; 7.5; 7.5 MG/1; MG/1; MG/1; MG/1
30 CAPSULE, EXTENDED RELEASE ORAL DAILY
Qty: 30 CAP | Refills: 0 | Status: SHIPPED | OUTPATIENT
Start: 2018-11-21 | End: 2018-12-20

## 2018-10-22 RX ORDER — DEXTROAMPHETAMINE SACCHARATE, AMPHETAMINE ASPARTATE, DEXTROAMPHETAMINE SULFATE AND AMPHETAMINE SULFATE 5; 5; 5; 5 MG/1; MG/1; MG/1; MG/1
20 TABLET ORAL DAILY
Qty: 30 TAB | Refills: 0 | Status: SHIPPED | OUTPATIENT
Start: 2018-12-21 | End: 2019-01-19

## 2018-10-22 RX ORDER — LEVOTHYROXINE SODIUM 50 UG/1
50 TABLET ORAL
Qty: 90 TAB | Refills: 1 | Status: SHIPPED | OUTPATIENT
Start: 2018-10-22 | End: 2019-04-22 | Stop reason: SDUPTHER

## 2018-10-22 RX ORDER — BUPROPION HYDROCHLORIDE 150 MG/1
150 TABLET, EXTENDED RELEASE ORAL 2 TIMES DAILY
Qty: 180 TAB | Refills: 1 | Status: SHIPPED | OUTPATIENT
Start: 2018-10-22 | End: 2019-04-22 | Stop reason: SDUPTHER

## 2018-10-22 RX ORDER — DEXTROAMPHETAMINE SACCHARATE, AMPHETAMINE ASPARTATE MONOHYDRATE, DEXTROAMPHETAMINE SULFATE AND AMPHETAMINE SULFATE 7.5; 7.5; 7.5; 7.5 MG/1; MG/1; MG/1; MG/1
30 CAPSULE, EXTENDED RELEASE ORAL
COMMUNITY
End: 2018-10-22 | Stop reason: SDUPTHER

## 2018-10-22 RX ORDER — ERGOCALCIFEROL 1.25 MG/1
50000 CAPSULE ORAL
Qty: 5 CAP | Refills: 2 | Status: SHIPPED | OUTPATIENT
Start: 2018-10-22 | End: 2019-02-05 | Stop reason: ALTCHOICE

## 2018-10-22 RX ORDER — HYDROCODONE BITARTRATE AND ACETAMINOPHEN 10; 325 MG/1; MG/1
1 TABLET ORAL
Qty: 90 TAB | Refills: 0 | Status: SHIPPED | OUTPATIENT
Start: 2018-10-22 | End: 2019-02-05 | Stop reason: SDUPTHER

## 2018-10-22 RX ORDER — DEXTROAMPHETAMINE SACCHARATE, AMPHETAMINE ASPARTATE MONOHYDRATE, DEXTROAMPHETAMINE SULFATE AND AMPHETAMINE SULFATE 7.5; 7.5; 7.5; 7.5 MG/1; MG/1; MG/1; MG/1
30 CAPSULE, EXTENDED RELEASE ORAL DAILY
Qty: 30 CAP | Refills: 0 | Status: SHIPPED | OUTPATIENT
Start: 2018-12-21 | End: 2019-01-19

## 2018-10-22 RX ORDER — DEXTROAMPHETAMINE SACCHARATE, AMPHETAMINE ASPARTATE MONOHYDRATE, DEXTROAMPHETAMINE SULFATE AND AMPHETAMINE SULFATE 7.5; 7.5; 7.5; 7.5 MG/1; MG/1; MG/1; MG/1
30 CAPSULE, EXTENDED RELEASE ORAL DAILY
Qty: 30 CAP | Refills: 0 | Status: SHIPPED | OUTPATIENT
Start: 2018-10-22 | End: 2018-11-20

## 2018-10-22 RX ORDER — CYANOCOBALAMIN 1000 UG/ML
1000 INJECTION, SOLUTION INTRAMUSCULAR; SUBCUTANEOUS
Qty: 3 VIAL | Refills: 1
Start: 2018-10-22 | End: 2019-02-05 | Stop reason: ALTCHOICE

## 2018-10-22 RX ORDER — DEXTROAMPHETAMINE SACCHARATE, AMPHETAMINE ASPARTATE, DEXTROAMPHETAMINE SULFATE AND AMPHETAMINE SULFATE 5; 5; 5; 5 MG/1; MG/1; MG/1; MG/1
20 TABLET ORAL DAILY
Qty: 30 TAB | Refills: 0 | Status: SHIPPED | OUTPATIENT
Start: 2018-10-22 | End: 2018-11-20

## 2018-10-22 RX ORDER — DEXTROAMPHETAMINE SACCHARATE, AMPHETAMINE ASPARTATE, DEXTROAMPHETAMINE SULFATE AND AMPHETAMINE SULFATE 5; 5; 5; 5 MG/1; MG/1; MG/1; MG/1
20 TABLET ORAL DAILY
Qty: 30 TAB | Refills: 0 | Status: SHIPPED | OUTPATIENT
Start: 2018-11-21 | End: 2018-12-20

## 2018-10-22 RX ORDER — CYANOCOBALAMIN 1000 UG/ML
1000 INJECTION, SOLUTION INTRAMUSCULAR; SUBCUTANEOUS ONCE
Qty: 1 ML | Refills: 0
Start: 2018-10-22 | End: 2018-10-22

## 2018-10-22 NOTE — PATIENT INSTRUCTIONS
Adjustment Disorder: Care Instructions  Your Care Instructions    Adjustment disorder means that you have emotional or behavioral problems because of stress. But your response to the stress is far more severe than a normal response. It is severe enough to affect your work or social life and may cause depression and physical pains and problems. Events that may cause this response can include a divorce, money problems, or starting school or a new job. It might be anything that causes some stress. This disorder is most often a short-term problem. It happens within 3 months of the stressful event or change. If the response lasts longer than 6 months after the event ends, you may have a more serious disorder. Follow-up care is a key part of your treatment and safety. Be sure to make and go to all appointments, and call your doctor if you are having problems. It's also a good idea to know your test results and keep a list of the medicines you take. How can you care for yourself at home? · Go to all counseling sessions. Do not skip any because you are feeling better. · If your doctor prescribed medicines, take them exactly as prescribed. Call your doctor if you think you are having a problem with your medicine. You will get more details on the specific medicines your doctor prescribes. · Discuss the causes of your stress with a good friend or family member. Or you can join a support group for people with similar problems. Talking to others sometimes relieves stress. · Get at least 30 minutes of exercise on most days of the week. Walking is a good choice. You also may want to do other activities, such as running, swimming, cycling, or playing tennis or team sports. Relaxation techniques  Do relaxation exercises 10 to 20 minutes a day. You can play soothing, relaxing music while you do them, if you wish. · Tell others in your house that you are going to do your relaxation exercises.  Ask them not to disturb you.  · Find a comfortable, quiet place. · Lie down on your back, or sit with your back straight. · Focus on your breathing. Make it slow and steady. · Breathe in through your nose. Breathe out through either your nose or mouth. · Breathe deeply, filling up the area between your navel and your rib cage. Breathe so that your belly goes up and down. · Do not hold your breath. · Breathe like this for 5 to 10 minutes. Notice the feeling of calmness throughout your whole body. As you continue to breathe slowly and deeply, relax by doing these next steps for another 5 to 10 minutes:  · Tighten and relax each muscle group in your body. Start at your toes, and work your way up to your head. · Imagine your muscle groups relaxing and getting heavy. · Empty your mind of all thoughts. · Let yourself relax more and more deeply. · Be aware of the state of calmness that surrounds you. · When your relaxation time is over, you can bring yourself back to alertness by moving your fingers and toes. Then move your hands and feet. And then move your entire body. Sometimes people fall asleep during relaxation. But they most often wake up soon. · Always give yourself time to return to full alertness before you drive a car. Wait to do anything that might cause an accident if you are not fully alert. Never play a relaxation tape while you drive a car. When should you call for help? Call 911 anytime you think you may need emergency care. For example, call if:    · You feel you cannot stop from hurting yourself or someone else. Keep the numbers for these national suicide hotlines: 5-001-038-TALK (6-434-371-601.429.9870) and 1-242-NCNRWDX (6-176.965.5061).  If you or someone you know talks about suicide or feeling hopeless, get help right away.    Watch closely for changes in your health, and be sure to contact your doctor if:    · You have new anxiety, or your anxiety gets worse.     · You have been feeling sad, depressed, or hopeless or have lost interest in things that you usually enjoy.     · You do not get better as expected. Where can you learn more? Go to http://charissa-maria elena.info/. Enter [id number] [id number] in the search box to learn more about \"Adjustment Disorder: Care Instructions. \"  Current as of: June 29, 2018  Content Version: 11.8  © 8483-9951 [employer]. Care instructions adapted under license by [employer] (which disclaims liability or warranty for this information). If you have questions about a medical condition or this instruction, always ask your healthcare professional. [place] [address] any warranty or liability for your use of this information. High Cholesterol: Care Instructions  Your Care Instructions    Cholesterol is a type of fat in your blood. It is needed for many body functions, such as making new cells. Cholesterol is made by your body. It also comes from food you eat. High cholesterol means that you have too much of the fat in your blood. This raises your risk of a heart attack and stroke. LDL and HDL are part of your total cholesterol. LDL is the \"bad\" cholesterol. High LDL can raise your risk for heart disease, heart attack, and stroke. HDL is the \"good\" cholesterol. It helps clear bad cholesterol from the body. High HDL is linked with a lower risk of heart disease, heart attack, and stroke. Your cholesterol levels help your doctor find out your risk for having a heart attack or stroke. You and your doctor can talk about whether you need to lower your risk and what treatment is best for you. A heart-healthy lifestyle along with medicines can help lower your cholesterol and your risk. The way you choose to lower your risk will depend on how high your risk is for heart attack and stroke. It will also depend on how you feel about taking medicines. Follow-up care is a key part of your treatment and safety.  Be sure to make and go to all appointments, and call your doctor if you are having problems. It's also a good idea to know your test results and keep a list of the medicines you take. How can you care for yourself at home? · Eat a variety of foods every day. Good choices include fruits, vegetables, whole grains (like oatmeal), dried beans and peas, nuts and seeds, soy products (like tofu), and fat-free or low-fat dairy products. · Replace butter, margarine, and hydrogenated or partially hydrogenated oils with olive and canola oils. (Canola oil margarine without trans fat is fine.)  · Replace red meat with fish, poultry, and soy protein (like tofu). · Limit processed and packaged foods like chips, crackers, and cookies. · Bake, broil, or steam foods. Don't sebastian them. · Be physically active. Get at least 30 minutes of exercise on most days of the week. Walking is a good choice. You also may want to do other activities, such as running, swimming, cycling, or playing tennis or team sports. · Stay at a healthy weight or lose weight by making the changes in eating and physical activity listed above. Losing just a small amount of weight, even 5 to 10 pounds, can reduce your risk for having a heart attack or stroke. · Do not smoke. When should you call for help? Watch closely for changes in your health, and be sure to contact your doctor if:    · You need help making lifestyle changes.     · You have questions about your medicine. Where can you learn more? Go to http://charissa-maria elena.info/. Enter A618 in the search box to learn more about \"High Cholesterol: Care Instructions. \"  Current as of: December 6, 2017  Content Version: 11.8  © 6241-6744 [employer]. Care instructions adapted under license by [employer] (which disclaims liability or warranty for this information).  If you have questions about a medical condition or this instruction, always ask your healthcare professional. Daniela Perkins any warranty or liability for your use of this information.

## 2018-10-22 NOTE — PROGRESS NOTES
Chief Complaint   Patient presents with    Medication Evaluation     Rm 5    Labs         HPI:  The patient is a 40 y.o. male who presents today for a follow up appointment. No hospital, ER or specialist visits since last primary care visit except as noted below. ADD:  Pt restarted on Adderall at his last visit. He reports his ADD symptoms were well controlled but now he feels it makes him jittery. He would like to try a different ADD medication, he was changed to Vyvanse on 8/1/2018. He does not feel it works as well as Adderall. Now he is taking Adderall which he feels is wearing off. He has returned to work and his working very long shifts. He would like to discuss another option. He has continues to struggle with being symptomatic with ADD as well as depression as well as returning to work. These variables have made effective treatment options challenging thus far. Depression:  He has been taking Wellbutrin BID with great improvement. He does report fatigue, states he previous PCP gave him a Vit B12 shot with good effectiveness. He reports considerably worsening depression this week, he reports 2-3 episodes of SI, he denies a plan and felt \"it would be ok if he did not wake up tomorrow\". At his last visit on 6/29/2018, he was started on Lamictal 25mg daily. He has now increased to 50mg daily and  reports his depressive symptoms are continuing to improve. He ran out of his Lamictal for approximately 1 week. He felt the Wellbutrin and Lamictal were managing his symptoms well. He had diarrhea and felt the Lamictal was the cause of this, therefore he stopped the Lamictal.  He reports his diarrhea resolved. He is now taking Wellbutrin BID and 25mg Lamictal daily with good effectiveness. He denies any adverse effects with both of these medications. Review of Systems  A comprehensive review of systems was negative except for that written in the HPI.     Patient Active Problem List Diagnosis Code    Adjustment reaction with anxiety and depression F43.23    Vitamin D deficiency E55.9    Hypercholesterolemia E78.00    Hypertension I10    GERD (gastroesophageal reflux disease) K21.9    ADD (attention deficit disorder) without hyperactivity F98.8    Moderate major depression ([facility] F32.1    Vitamin B12 deficiency E53.8    Hypothyroidism E03.9    Thyroid disease E07.9       Past Medical History:   Diagnosis Date    ADD (attention deficit disorder) without hyperactivity 6/7/2018    Adjustment reaction with anxiety and depression     Attention deficit hyperactivity disorder (ADHD), predominantly inattentive type 6/7/2018    Cancer ([facility]     testicular     Cancer ([facility] 2010    Testicular    Community acquired pneumonia     Depression     GERD (gastroesophageal reflux disease)     Headache     Hypercholesterolemia     Hypertension     Thyroid disease 2017    Vitamin D deficiency        Past Surgical History:   Procedure Laterality Date    HX ORCHIECTOMY Right 2010    HX UROLOGICAL      removed on e testicle    HX WISDOM TEETH EXTRACTION  1996       Social History     Tobacco Use    Smoking status: Former Smoker     Packs/day: 1.00     Years: 8.00     Pack years: 8.00     Types: Cigarettes     Last attempt to quit: 2008     Years since quitting: 10.8    Smokeless tobacco: Never Used   Substance Use Topics    Alcohol use:  Yes     Alcohol/week: 6.0 oz     Types: 24 Cans of beer per week     Frequency: Monthly or less     Drinks per session: 1 or 2     Binge frequency: Less than monthly    Drug use: No       Family History   Problem Relation Age of Onset    Cancer Mother         Ovarian    Hypertension Mother     Cancer Father         Throat    Hypertension Father     Arrhythmia Brother         Afib    No Known Problems Maternal Grandmother     No Known Problems Maternal Grandfather     No Known Problems Paternal Grandmother     No Known Problems Paternal Grandfather Outpatient Medications Marked as Taking for the 10/22/18 encounter (Office Visit) with Juli Guajardo NP   Medication Sig Dispense Refill    cyanocobalamin (VITAMIN B-12) 1,000 mcg/mL injection 1 mL by IntraMUSCular route once for 1 dose. 1 mL 0    atorvastatin (LIPITOR) 20 mg tablet Take 1 Tab by mouth daily. 90 Tab 1    buPROPion SR (WELLBUTRIN SR) 150 mg SR tablet Take 1 Tab by mouth two (2) times a day. 180 Tab 1    cyanocobalamin (VITAMIN B12) 1,000 mcg/mL injection 1 mL by IntraMUSCular route every thirty (30) days. 3 Vial 1    ergocalciferol (ERGOCALCIFEROL) 50,000 unit capsule Take 1 Cap by mouth every seven (7) days. 5 Cap 2    lamoTRIgine (LAMICTAL) 25 mg tablet Take 1 Tab by mouth daily. 90 Tab 1    levothyroxine (SYNTHROID) 50 mcg tablet Take 1 Tab by mouth Daily (before breakfast). 90 Tab 1    Syringe with Needle, Safety (BD INTEGRA SYRINGE) 3 mL 25 gauge x 5/8\" syrg Use as directed monthly 3 Pen Needle 1    amphetamine-dextroamphetamine XR (ADDERALL XR) 30 mg XR capsule Take 1 Cap (30 mg total) by mouth dailyEarliest Fill Date: 10/22/18. Max Daily Amount: 30 mg 30 Cap 0    [START ON 11/21/2018] amphetamine-dextroamphetamine XR (ADDERALL XR) 30 mg XR capsule Take 1 Cap (30 mg total) by mouth dailyEarliest Fill Date: 11/21/18. Max Daily Amount: 30 mg 30 Cap 0    [START ON 12/21/2018] amphetamine-dextroamphetamine XR (ADDERALL XR) 30 mg XR capsule Take 1 Cap (30 mg total) by mouth dailyEarliest Fill Date: 12/21/18. Max Daily Amount: 30 mg 30 Cap 0    dextroamphetamine-amphetamine (ADDERALL) 20 mg tablet Take 1 Tab (20 mg total) by mouth dailyEarliest Fill Date: 10/22/18. Max Daily Amount: 20 mg 30 Tab 0    [START ON 11/21/2018] dextroamphetamine-amphetamine (ADDERALL) 20 mg tablet Take 1 Tab (20 mg total) by mouth dailyEarliest Fill Date: 11/21/18.   Max Daily Amount: 20 mg 30 Tab 0    [START ON 12/21/2018] dextroamphetamine-amphetamine (ADDERALL) 20 mg tablet Take 1 Tab (20 mg total) by mouth dailyEarliest Fill Date: 12/21/18. Max Daily Amount: 20 mg 30 Tab 0    HYDROcodone-acetaminophen (NORCO)  mg tablet Take 1 Tab by mouth every six (6) hours as needed for Pain. Max Daily Amount: 4 Tabs. 90 Tab 0    multivitamin (ONE A DAY) tablet Take 1 Tab by mouth daily. No Known Allergies    PE:  Visit Vitals  /82 (BP 1 Location: Left arm, BP Patient Position: Sitting)   Pulse 73   Temp 98.7 °F (37.1 °C) (Oral)   Resp 19   Ht 6' 4.5\" (1.943 m)   Wt 251 lb 6.4 oz (114 kg)   SpO2 98%   BMI 30.20 kg/m²     Gen: alert, oriented, no acute distress  Head: normocephalic, atraumatic  Ears: external auditory canals clear, TMs without erythema or effusion  Eyes: pupils equal round reactive to light, sclera clear, conjunctiva clear  Oral: moist mucus membranes, no oral lesions, no pharyngeal inflammation or exudate  Neck: symmetric normal sized thyroid, no carotid bruits, no jugular vein distention  Resp: no increase work of breathing, lungs clear to ausculation bilaterally, no wheezing, rales or rhonchi  CV: S1, S2 normal.  No murmurs, rubs, or gallops. Abd: soft, not tender, not distended. No hepatosplenomegaly. Normal bowel sounds. No hernias. No abdominal or renal bruits. Neuro: cranial nerves intact, normal strength and movement in all extremities, reflexes and sensation intact and symmetric. Skin: no lesion or rash  Extremities: no cyanosis or edema    Assessment/Plan:    ICD-10-CM ICD-9-CM    1. ADD (attention deficit disorder) without hyperactivity F98.8 314.00 amphetamine-dextroamphetamine XR (ADDERALL XR) 30 mg XR capsule      amphetamine-dextroamphetamine XR (ADDERALL XR) 30 mg XR capsule      amphetamine-dextroamphetamine XR (ADDERALL XR) 30 mg XR capsule      dextroamphetamine-amphetamine (ADDERALL) 20 mg tablet      dextroamphetamine-amphetamine (ADDERALL) 20 mg tablet      dextroamphetamine-amphetamine (ADDERALL) 20 mg tablet   2.  Hypothyroidism, unspecified type E03.9 244.9 TSH 3RD GENERATION      levothyroxine (SYNTHROID) 50 mcg tablet   3. Vitamin D deficiency E55.9 268.9 VITAMIN D, 25 HYDROXY      ergocalciferol (ERGOCALCIFEROL) 50,000 unit capsule   4. Hypercholesterolemia E78.00 272.0 LIPID PANEL      atorvastatin (LIPITOR) 20 mg tablet   5. Vitamin B12 deficiency E53.8 266.2 VITAMIN B12 INJECTION      THER/PROPH/DIAG INJECTION, SUBCUT/IM      cyanocobalamin (VITAMIN B-12) 1,000 mcg/mL injection      cyanocobalamin (VITAMIN B12) 1,000 mcg/mL injection      Syringe with Needle, Safety (BD INTEGRA SYRINGE) 3 mL 25 gauge x 5/8\" syrg   6. Adjustment reaction with anxiety and depression F43.23 309.28 buPROPion SR (WELLBUTRIN SR) 150 mg SR tablet      lamoTRIgine (LAMICTAL) 25 mg tablet   7. Other chronic pain G89.29 338.29 HYDROcodone-acetaminophen (NORCO)  mg tablet     Follow-up Disposition:  Return in about 3 months (around 1/22/2019) for Medication Check, Chronic Pain, ADHD/ADD, Hypothyroidism, Vitamin D deficiency, Labs F/U.  lab results and schedule of future lab studies reviewed with patient - rechecked XOL, Vit D, TSH today in the office, will adjust medications as needed  reviewed diet, exercise and weight control  reviewed medications and side effects in detail    Controlled substance plan and safety assessment:  Medication: Adderall XR, Adderall, Norco  MME/day: 40   >= 50? N/A   >= 120? N/A              Naloxone Rx?  N/A   appropriate and last checked on: 10/22/2018  Last Urine Toxicology: 6/27/2018, appropriate  Treatment agreement was signed by pt and myself on: 6/25/2018    24 hour opioid dose >120mg morphine equivalent/day:  [] Yes   [x] No  Benzodiazepines:  [] Yes   [x] No  Sleep apnea:  [] Yes   [x] No  Urine Toxicology Testing within last 6 months:  [x] Yes   [] No  History of or new aberrant medication taking behaviors:  [] Yes   [x] No    Controlled Substance Refills given  Date prescription signed by me  medication  Amount Refills   10/22/2018 Adderall 30mg XR 30 2   10/22/2018  Adderall 20mg 30 2   10/22/18 Norco 10/325 90 0     Health Maintenance reviewed - flu vaccine done at [facility] last month, pneumonia vaccine not necessary, insurance will not cover. .    Recommended healthy diet low in carbohydrates, fats, sodium and cholesterol. Recommended regular cardiovascular exercise 3-6 times per week for 30-60 minutes daily. Chart is reviewed and updated today in the office. Records requested for other providers patient has seen and is currently seeing. Patient was offered a choice/choices in the treatment plan today. Patient expresses understanding of the plan and agrees with recommendations. Verbal and written instructions (see AVS) provided. See patient instructions for more. Patient expresses understanding of diagnosis and treatment plan.

## 2018-10-22 NOTE — PROGRESS NOTES
Chief Complaint   Patient presents with    Medication Evaluation     Rm 10[address] [facility]  Identified pt with two pt identifiers(name and ). Chief Complaint   Patient presents with    Medication Evaluation     Rm 5    Labs       1. Have you been to the ER, urgent care clinic since your last visit? n  Hospitalized since your last visit? 2. Have you seen or consulted any other health care providers outside of the [address] [place] [address] since your last visit? n  Include any pap smears or colon screening. n       Advance Care Planning    In the event something were to happen to you and you were unable to speak on your behalf, do you have an Advance Directive/ Living Will in place stating your wishes? NO    If yes, do we have a copy on file NO    If no, would you like information YES    Medication reconciliation up to date and corrected with patient at this time. Today's provider has been notified of reason for visit, vitals and flowsheets obtained on patients. Reviewed record in preparation for visit, huddled with provider and have obtained necessary documentation.       Health Maintenance Due   Topic    Pneumococcal 19-64 Highest Risk (1 of 3 - PCV13)    Influenza Age 5 to Adult        Wt Readings from Last 3 Encounters:   10/22/18 251 lb 6.4 oz (114 kg)   18 250 lb 12.8 oz (113.8 kg)   08/10/18 248 lb 9.6 oz (112.8 kg)     Temp Readings from Last 3 Encounters:   10/22/18 98.7 °F (37.1 °C) (Oral)   18 97.1 °F (36.2 °C) (Oral)   08/10/18 97.8 °F (36.6 °C) (Temporal)     BP Readings from Last 3 Encounters:   10/22/18 124/82   18 (!) 141/97   08/10/18 145/84     Pulse Readings from Last 3 Encounters:   10/22/18 73   18 85   08/10/18 80     Vitals:    10/22/18 1148   BP: 124/82   Pulse: 73   Resp: 19   Temp: 98.7 °F (37.1 °C)   TempSrc: Oral   SpO2: 98%   Weight: 251 lb 6.4 oz (114 kg)   Height: 6' 4.5\" (1.943 m)   PainSc:   0 - No pain         Learning Assessment:  :     Learning Assessment 11/19/2015   PRIMARY LEARNER Patient   PRIMARY LANGUAGE ENGLISH   LEARNER PREFERENCE PRIMARY OTHER (COMMENT)   ANSWERED BY pt   RELATIONSHIP SELF       Depression Screening:  :     PHQ over the last two weeks 8/24/2018   Little interest or pleasure in doing things Not at all   Feeling down, depressed, irritable, or hopeless Not at all   Total Score PHQ 2 0   Trouble falling or staying asleep, or sleeping too much -   Feeling tired or having little energy -   Poor appetite, weight loss, or overeating -   Feeling bad about yourself - or that you are a failure or have let yourself or your family down -   Trouble concentrating on things such as school, work, reading, or watching TV -   Moving or speaking so slowly that other people could have noticed; or the opposite being so fidgety that others notice -   Thoughts of being better off dead, or hurting yourself in some way -   PHQ 9 Score -   How difficult have these problems made it for you to do your work, take care of your home and get along with others -       Fall Risk Assessment:  :     No flowsheet data found. Abuse Screening:  :     Abuse Screening Questionnaire 8/24/2018   Do you ever feel afraid of your partner? N   Are you in a relationship with someone who physically or mentally threatens you? N   Is it safe for you to go home?  Y       ADL Screening:  :     ADL Assessment 6/5/2018   Feeding yourself No Help Needed   Getting from bed to chair No Help Needed   Getting dressed No Help Needed   Bathing or showering No Help Needed   Walk across the room (includes cane/walker) No Help Needed   Using the telphone No Help Needed   Taking your medications No Help Needed   Preparing meals No Help Needed   Managing money (expenses/bills) No Help Needed   Moderately strenuous housework (laundry) No Help Needed   Shopping for personal items (toiletries/medicines) No Help Needed   Shopping for groceries No Help Needed   Driving No Help Needed   Climbing a flight of stairs No Help Needed   Getting to places beyond walking distances No Help Needed           BMI:  Weight Metrics 10/22/2018 8/24/2018 8/10/2018 8/1/2018 7/27/2018 7/13/2018 7/9/2018   Weight 251 lb 6.4 oz 250 lb 12.8 oz 248 lb 9.6 oz 252 lb 12.8 oz 252 lb 14.4 oz 252 lb 9.6 oz 254 lb 4.8 oz   BMI 30.2 kg/m2 30.13 kg/m2 29.87 kg/m2 30.37 kg/m2 30.38 kg/m2 30.35 kg/m2 30.55 kg/m2           Medication reconciliation up to date and corrected with patient at this time.

## 2018-10-23 LAB
25(OH)D3+25(OH)D2 SERPL-MCNC: 37.9 NG/ML (ref 30–100)
CHOLEST SERPL-MCNC: 163 MG/DL (ref 100–199)
HDLC SERPL-MCNC: 51 MG/DL
INTERPRETATION, 910389: NORMAL
LDLC SERPL CALC-MCNC: 83 MG/DL (ref 0–99)
TRIGL SERPL-MCNC: 147 MG/DL (ref 0–149)
TSH SERPL DL<=0.005 MIU/L-ACNC: 2.21 UIU/ML (ref 0.45–4.5)
VLDLC SERPL CALC-MCNC: 29 MG/DL (ref 5–40)

## 2019-01-08 ENCOUNTER — TELEPHONE (OUTPATIENT)
Dept: FAMILY MEDICINE CLINIC | Age: 44
End: 2019-01-08

## 2019-01-08 DIAGNOSIS — F98.8 ADD (ATTENTION DEFICIT DISORDER) WITHOUT HYPERACTIVITY: ICD-10-CM

## 2019-02-05 ENCOUNTER — OFFICE VISIT (OUTPATIENT)
Dept: FAMILY MEDICINE CLINIC | Age: 44
End: 2019-02-05

## 2019-02-05 VITALS
DIASTOLIC BLOOD PRESSURE: 80 MMHG | WEIGHT: 258.3 LBS | BODY MASS INDEX: 32.12 KG/M2 | HEART RATE: 91 BPM | RESPIRATION RATE: 20 BRPM | SYSTOLIC BLOOD PRESSURE: 140 MMHG | HEIGHT: 75 IN | TEMPERATURE: 98.1 F | OXYGEN SATURATION: 95 %

## 2019-02-05 DIAGNOSIS — F98.8 ADD (ATTENTION DEFICIT DISORDER) WITHOUT HYPERACTIVITY: ICD-10-CM

## 2019-02-05 DIAGNOSIS — G89.29 OTHER CHRONIC PAIN: ICD-10-CM

## 2019-02-05 DIAGNOSIS — E53.8 VITAMIN B12 DEFICIENCY: Primary | ICD-10-CM

## 2019-02-05 RX ORDER — CYANOCOBALAMIN 1000 UG/ML
1000 INJECTION, SOLUTION INTRAMUSCULAR; SUBCUTANEOUS ONCE
Qty: 1 ML | Refills: 0
Start: 2019-02-05 | End: 2019-02-05 | Stop reason: ALTCHOICE

## 2019-02-05 RX ORDER — DEXTROAMPHETAMINE SACCHARATE, AMPHETAMINE ASPARTATE MONOHYDRATE, DEXTROAMPHETAMINE SULFATE AND AMPHETAMINE SULFATE 7.5; 7.5; 7.5; 7.5 MG/1; MG/1; MG/1; MG/1
30 CAPSULE, EXTENDED RELEASE ORAL DAILY
Qty: 30 CAP | Refills: 0 | Status: SHIPPED | OUTPATIENT
Start: 2019-03-07 | End: 2019-04-05

## 2019-02-05 RX ORDER — DEXTROAMPHETAMINE SACCHARATE, AMPHETAMINE ASPARTATE, DEXTROAMPHETAMINE SULFATE AND AMPHETAMINE SULFATE 7.5; 7.5; 7.5; 7.5 MG/1; MG/1; MG/1; MG/1
30 TABLET ORAL DAILY
Qty: 30 TAB | Refills: 0 | Status: SHIPPED | OUTPATIENT
Start: 2019-04-06 | End: 2019-04-30 | Stop reason: SDUPTHER

## 2019-02-05 RX ORDER — DEXTROAMPHETAMINE SACCHARATE, AMPHETAMINE ASPARTATE MONOHYDRATE, DEXTROAMPHETAMINE SULFATE AND AMPHETAMINE SULFATE 7.5; 7.5; 7.5; 7.5 MG/1; MG/1; MG/1; MG/1
30 CAPSULE, EXTENDED RELEASE ORAL DAILY
Qty: 30 CAP | Refills: 0 | Status: SHIPPED | OUTPATIENT
Start: 2019-04-06 | End: 2019-04-30 | Stop reason: SDUPTHER

## 2019-02-05 RX ORDER — DEXTROAMPHETAMINE SULFATE, DEXTROAMPHETAMINE SACCHARATE, AMPHETAMINE SULFATE AND AMPHETAMINE ASPARTATE 5; 5; 5; 5 MG/1; MG/1; MG/1; MG/1
20 CAPSULE, EXTENDED RELEASE ORAL DAILY
Refills: 0 | COMMUNITY
Start: 2018-12-12 | End: 2019-02-05 | Stop reason: DRUGHIGH

## 2019-02-05 RX ORDER — DEXTROAMPHETAMINE SACCHARATE, AMPHETAMINE ASPARTATE, DEXTROAMPHETAMINE SULFATE AND AMPHETAMINE SULFATE 5; 5; 5; 5 MG/1; MG/1; MG/1; MG/1
20 TABLET ORAL DAILY
Refills: 0 | COMMUNITY
Start: 2019-01-25 | End: 2019-02-05 | Stop reason: DRUGHIGH

## 2019-02-05 RX ORDER — HYDROCODONE BITARTRATE AND ACETAMINOPHEN 10; 325 MG/1; MG/1
1 TABLET ORAL
Qty: 90 TAB | Refills: 0 | Status: SHIPPED | OUTPATIENT
Start: 2019-02-05 | End: 2019-04-30 | Stop reason: SDUPTHER

## 2019-02-05 RX ORDER — DEXTROAMPHETAMINE SACCHARATE, AMPHETAMINE ASPARTATE, DEXTROAMPHETAMINE SULFATE AND AMPHETAMINE SULFATE 7.5; 7.5; 7.5; 7.5 MG/1; MG/1; MG/1; MG/1
30 TABLET ORAL DAILY
Qty: 30 TAB | Refills: 0 | Status: SHIPPED | OUTPATIENT
Start: 2019-02-05 | End: 2019-03-06

## 2019-02-05 RX ORDER — DEXTROAMPHETAMINE SACCHARATE, AMPHETAMINE ASPARTATE, DEXTROAMPHETAMINE SULFATE AND AMPHETAMINE SULFATE 7.5; 7.5; 7.5; 7.5 MG/1; MG/1; MG/1; MG/1
30 TABLET ORAL DAILY
Qty: 30 TAB | Refills: 0 | Status: SHIPPED | OUTPATIENT
Start: 2019-03-07 | End: 2019-04-05

## 2019-02-05 RX ORDER — DEXTROAMPHETAMINE SACCHARATE, AMPHETAMINE ASPARTATE MONOHYDRATE, DEXTROAMPHETAMINE SULFATE AND AMPHETAMINE SULFATE 7.5; 7.5; 7.5; 7.5 MG/1; MG/1; MG/1; MG/1
30 CAPSULE, EXTENDED RELEASE ORAL DAILY
Qty: 30 CAP | Refills: 0 | Status: SHIPPED | OUTPATIENT
Start: 2019-02-05 | End: 2019-03-06

## 2019-02-05 NOTE — PROGRESS NOTES
Chief Complaint Patient presents with  Medication Refill Rm 7  
 Cough HPI: 
The patient is a 44 y.o. male who presents today for a follow up appointment. No hospital, ER or specialist visits since last primary care visit except as noted below. ADD: 
Pt restarted on Adderall at his last visit. He reports his ADD symptoms were well controlled but now he feels it makes him jittery. He would like to try a different ADD medication, he was changed to Vyvanse on 8/1/2018. He does not feel it works as well as Adderall. Now he is taking Adderall which he feels is wearing off. He has returned to work and his working very long shifts. He would like to discuss another option. He has continues to struggle with being symptomatic with ADD as well as depression as well as returning to work. These variables have made effective treatment options challenging thus far. He has been taking Adderall 20mg XR w/ Adderall 20mg in PM.  He had been on Adderall 30mg XR but was changed incidentally while I was out of the office. He has noticed a difference and would like to switch back to 30mg XR. Depression: He has been taking Wellbutrin BID with great improvement. He does report fatigue, states he previous PCP gave him a Vit B12 shot with good effectiveness. He reports considerably worsening depression this week, he reports 2-3 episodes of SI, he denies a plan and felt \"it would be ok if he did not wake up tomorrow\". At his last visit on 6/29/2018, he was started on Lamictal 25mg daily. He has now increased to 50mg daily and  reports his depressive symptoms are continuing to improve. He ran out of his Lamictal for approximately 1 week. He felt the Wellbutrin and Lamictal were managing his symptoms well. He had diarrhea and felt the Lamictal was the cause of this, therefore he stopped the Lamictal.  He reports his diarrhea resolved.   He is now taking Wellbutrin BID and 25mg Lamictal daily with good effectiveness. He denies any adverse effects with both of these medications. Chronic Pain: He continues to have joint and back pain secondary to working as [employer] as a  and . He is taking Hydrocodone in the evenings PRN. He is out of this and would like a refill today. HTN: 
He stopped taking Lisinopril and was normotensive at his last visit on 6/29/2018. However he is still not taking his medication but his blood pressure has increased. He denies CP/SOB/palpitations, HECTOR, and HA. He has not been taking his Lisinopril and reports that he drank a red bull energy drink before he came to his visit. He was normotensive at his last visit. However his blood pressure continues to be labile. He is just getting over a cold and took Dayquil today. BP Readings from Last 3 Encounters:  
02/05/19 140/80  
10/22/18 124/82  
08/24/18 (!) 141/97 Labs: All UTD, WNL Review of Systems A comprehensive review of systems was negative except for that written in the HPI. Patient Active Problem List  
Diagnosis Code  Adjustment reaction with anxiety and depression F43.23  Vitamin D deficiency E55.9  Hypercholesterolemia E78.00  Hypertension I10  
 GERD (gastroesophageal reflux disease) K21.9  ADD (attention deficit disorder) without hyperactivity F98.8  Moderate major depression (HCC) F32.1  Vitamin B12 deficiency E53.8  Hypothyroidism E03.9  Thyroid disease E07.9 Past Medical History:  
Diagnosis Date  ADD (attention deficit disorder) without hyperactivity 6/7/2018  Adjustment reaction with anxiety and depression  Attention deficit hyperactivity disorder (ADHD), predominantly inattentive type 6/7/2018  Cancer ([facility] [address] testicular  Cancer ([facility] [address] 2010 Testicular  Community acquired pneumonia  Depression  GERD (gastroesophageal reflux disease)  Headache  Hypercholesterolemia  Hypertension  Thyroid disease 2017  Vitamin D deficiency Past Surgical History:  
Procedure Laterality Date  HX ORCHIECTOMY Right 2010  HX UROLOGICAL    
 removed on e testicle [place] Social History Tobacco Use  Smoking status: Former Smoker Packs/day: 1.00 Years: 8.00 Pack years: 8.00 Types: Cigarettes Last attempt to quit: 2008 Years since quittin.1  Smokeless tobacco: Never Used Substance Use Topics  Alcohol use: Yes Alcohol/week: 6.0 oz Types: 24 Cans of beer per week Frequency: Monthly or less Drinks per session: 1 or 2 Binge frequency: Less than monthly  Drug use: No  
 
 
Family History Problem Relation Age of Onset  Cancer Mother Ovarian  Hypertension Mother  Cancer Father Throat  Hypertension Father  Arrhythmia Brother Afib  No Known Problems Maternal Grandmother  No Known Problems Maternal Grandfather  No Known Problems Paternal Grandmother  No Known Problems Paternal Grandfather Current Outpatient Medications on File Prior to Visit Medication Sig Dispense Refill  atorvastatin (LIPITOR) 20 mg tablet Take 1 Tab by mouth daily. 90 Tab 1  
 buPROPion SR (WELLBUTRIN SR) 150 mg SR tablet Take 1 Tab by mouth two (2) times a day. 180 Tab 1  
 lamoTRIgine (LAMICTAL) 25 mg tablet Take 1 Tab by mouth daily. 90 Tab 1  
 levothyroxine (SYNTHROID) 50 mcg tablet Take 1 Tab by mouth Daily (before breakfast). 90 Tab 1  
 multivitamin (ONE A DAY) tablet Take 1 Tab by mouth daily. No current facility-administered medications on file prior to visit. No Known Allergies PE: 
Visit Vitals /80 (BP 1 Location: Left arm, BP Patient Position: Sitting) Pulse 91 Temp 98.1 °F (36.7 °C) (Oral) Resp 20 Ht 6' 3\" (1.905 m) Wt 258 lb 4.8 oz (117.2 kg) SpO2 95% BMI 32.29 kg/m² Results for orders placed or performed in visit on 10/22/18 TSH 3RD GENERATION Result Value Ref Range TSH 2.210 0.450 - 4.500 uIU/mL VITAMIN D, 25 HYDROXY Result Value Ref Range VITAMIN D, 25-HYDROXY 37.9 30.0 - 100.0 ng/mL LIPID PANEL Result Value Ref Range Cholesterol, total 163 100 - 199 mg/dL Triglyceride 147 0 - 149 mg/dL HDL Cholesterol 51 >39 mg/dL VLDL, calculated 29 5 - 40 mg/dL LDL, calculated 83 0 - 99 mg/dL CVD REPORT Result Value Ref Range INTERPRETATION Note Gen: alert, oriented, no acute distress Head: normocephalic, atraumatic Ears: external auditory canals clear, TMs without erythema or effusion Eyes: pupils equal round reactive to light, sclera clear, conjunctiva clear Oral: moist mucus membranes, no oral lesions, no pharyngeal inflammation or exudate Neck: symmetric normal sized thyroid, no carotid bruits, no jugular vein distention Resp: no increase work of breathing, lungs clear to ausculation bilaterally, no wheezing, rales or rhonchi CV: S1, S2 normal.  No murmurs, rubs, or gallops. Abd: soft, not tender, not distended. No hepatosplenomegaly. Normal bowel sounds. No hernias. No abdominal or renal bruits. Neuro: cranial nerves intact, normal strength and movement in all extremities, reflexes and sensation intact and symmetric. Skin: no lesion or rash Extremities: no cyanosis or edema Assessment/Plan: ICD-10-CM ICD-9-CM 1. Vitamin B12 deficiency E53.8 266.2 VITAMIN B12 INJECTION  
   THER/PROPH/DIAG INJECTION, SUBCUT/IM  
   DISCONTINUED: cyanocobalamin (VITAMIN B-12) 1,000 mcg/mL injection 2. ADD (attention deficit disorder) without hyperactivity F98.8 314.00 amphetamine-dextroamphetamine XR (ADDERALL XR) 30 mg XR capsule  
   amphetamine-dextroamphetamine XR (ADDERALL XR) 30 mg XR capsule  
   amphetamine-dextroamphetamine XR (ADDERALL XR) 30 mg XR capsule  
   dextroamphetamine-amphetamine (ADDERALL) 30 mg tablet dextroamphetamine-amphetamine (ADDERALL) 30 mg tablet  
   dextroamphetamine-amphetamine (ADDERALL) 30 mg tablet 3. Other chronic pain G89.29 338.29 HYDROcodone-acetaminophen (NORCO)  mg tablet Follow-up Disposition: 
Return in about 3 months (around 5/5/2019) for Medication Check, Chronic Pain, ADHD/ADD, Back Pain, Labs, B12 Injection. lab results and schedule of future lab studies reviewed with patient 
reviewed diet, exercise and weight control 
reviewed medications and side effects in detail Controlled substance plan and safety assessment: 
Medication: Norco/Adderall XR/Adderall MME/day: 40 
 >= 50? no 
 >= 120? Naloxone Rx? no 
 appropriate and last checked on: 2/5/2018 Last Urine Toxicology: yes, appropriate Treatment agreement was signed by pt and myself on: 6/25/2018 
 
24 hour opioid dose >120mg morphine equivalent/day:  [] Yes   [x] No 
Benzodiazepines:  [] Yes   [x] No 
Sleep apnea:  [] Yes   [x] No 
Urine Toxicology Testing within last 6 months:  [x] Yes   [] No 
History of or new aberrant medication taking behaviors:  [] Yes   [x] No 
 
Controlled Substance Refills given Date prescription signed by me  medication  Amount Refills 2/5/2019  Adderall 30mg XR 30 2  
2/5/2019  Adderall 30mg 30 2  
 
2/5/2019  [place] 10/325 90 0 Health Maintenance reviewed - reviewed, UTD. Recommended healthy diet low in carbohydrates, fats, sodium and cholesterol. Recommended regular cardiovascular exercise 3-6 times per week for 30-60 minutes daily. Chart is reviewed and updated today in the office. Records requested for other providers patient has seen and is currently seeing. Patient was offered a choice/choices in the treatment plan today. Patient expresses understanding of the plan and agrees with recommendations. Verbal and written instructions (see AVS) provided. See patient instructions for more. Patient expresses understanding of diagnosis and treatment plan.

## 2019-02-05 NOTE — PATIENT INSTRUCTIONS
Chronic Pain: Care Instructions Your Care Instructions Chronic pain is pain that lasts a long time (months or even years) and may or may not have a clear cause. It is different from acute pain, which usually does have a clear causelike an injury or illnessand gets better over time. Chronic pain: 
· Lasts over time but may vary from day to day. · Does not go away despite efforts to end it. · May disrupt your sleep and lead to fatigue. · May cause depression or anxiety. · May make your muscles tense, causing more pain. · Can disrupt your work, hobbies, home life, and relationships with friends and family. Chronic pain is a very real condition. It is not just in your head. Treatment can help and usually includes several methods used together, such as medicines, physical therapy, exercise, and other treatments. Learning how to relax and changing negative thought patterns can also help you cope. Chronic pain is complex. Taking an active role in your treatment will help you better manage your pain. Tell your doctor if you have trouble dealing with your pain. You may have to try several things before you find what works best for you. Follow-up care is a key part of your treatment and safety. Be sure to make and go to all appointments, and call your doctor if you are having problems. It's also a good idea to know your test results and keep a list of the medicines you take. How can you care for yourself at home? · Pace yourself. Break up large jobs into smaller tasks. Save harder tasks for days when you have less pain, or go back and forth between hard tasks and easier ones. Take rest breaks. · Relax, and reduce stress. Relaxation techniques such as deep breathing or meditation can help. · Keep moving. Gentle, daily exercise can help reduce pain over the long run. Try low- or no-impact exercises such as walking, swimming, and stationary biking. Do stretches to stay flexible. · Try heat, cold packs, and massage. · Get enough sleep. Chronic pain can make you tired and drain your energy. Talk with your doctor if you have trouble sleeping because of pain. · Think positive. Your thoughts can affect your pain level. Do things that you enjoy to distract yourself when you have pain instead of focusing on the pain. See a movie, read a book, listen to music, or spend time with a friend. · If you think you are depressed, talk to your doctor about treatment. · Keep a daily pain diary. Record how your moods, thoughts, sleep patterns, activities, and medicine affect your pain. You may find that your pain is worse during or after certain activities or when you are feeling a certain emotion. Having a record of your pain can help you and your doctor find the best ways to treat your pain. · Take pain medicines exactly as directed. ? If the doctor gave you a prescription medicine for pain, take it as prescribed. ? If you are not taking a prescription pain medicine, ask your doctor if you can take an over-the-counter medicine. Reducing constipation caused by pain medicine · Include fruits, vegetables, beans, and whole grains in your diet each day. These foods are high in fiber. · Drink plenty of fluids, enough so that your urine is light yellow or clear like water. If you have kidney, heart, or liver disease and have to limit fluids, talk with your doctor before you increase the amount of fluids you drink. · If your doctor recommends it, get more exercise. Walking is a good choice. Bit by bit, increase the amount you walk every day. Try for at least 30 minutes on most days of the week. · Schedule time each day for a bowel movement. A daily routine may help. Take your time and do not strain when having a bowel movement. When should you call for help? Call your doctor now or seek immediate medical care if: 
  · Your pain gets worse or is out of control.   · You feel down or blue, or you do not enjoy things like you once did. You may be depressed, which is common in people with chronic pain. Depression can be treated.  
  · You have vomiting or cramps for more than 2 hours.  
 Watch closely for changes in your health, and be sure to contact your doctor if: 
  · You cannot sleep because of pain.  
  · You are very worried or anxious about your pain.  
  · You have trouble taking your pain medicine.  
  · You have any concerns about your pain medicine.  
  · You have trouble with bowel movements, such as: 
? No bowel movement in 3 days. ? Blood in the anal area, in your stool, or on the toilet paper. ? Diarrhea for more than 24 hours. Where can you learn more? Go to http://charissa-maria elena.info/. Enter N004 in the search box to learn more about \"Chronic Pain: Care Instructions. \" Current as of: Yuliya 3, 2018 Content Version: 11.9 © 9326-9129 [employer]. Care instructions adapted under license by [employer] (which disclaims liability or warranty for this information). If you have questions about a medical condition or this instruction, always ask your healthcare professional. [address] any warranty or liability for your use of this information. Attention Deficit Hyperactivity Disorder (ADHD) in Adults: Care Instructions Your Care Instructions Attention deficit hyperactivity disorder, or ADHD, is a condition that makes it hard to pay attention. So you may have problems when you try to focus, get organized, and finish tasks. It might make you more active than other people. Or you might do things without thinking first. 
ADHD is very common. It usually starts in early childhood. Many adults don't realize they have it until their children are diagnosed. Then they become aware of their own symptoms. Doctors don't know what causes ADHD. But it often runs in families. ADHD can be treated with medicines, behavior training, and counseling. Treatment can improve your life. Follow-up care is a key part of your treatment and safety. Be sure to make and go to all appointments, and call your doctor if you are having problems. It's also a good idea to know your test results and keep a list of the medicines you take. How can you care for yourself at home? · Learn all you can about ADHD. This will help you and your family understand it better. · Take your medicines exactly as prescribed. Call your doctor if you think you are having a problem with your medicine. You will get more details on the specific medicines your doctor prescribes. · If you miss a dose of your medicine, do not take an extra dose. · If your doctor suggests counseling, find a counselor you like and trust. Talk openly and honestly. Be willing to make some changes. · Find a support group for adults with ADHD. Talking to others with the same problems can help you feel better. It can also give you ideas about how to best cope with the condition. · Get rid of distractions at your work space. Keep your desk clean. Try not to face a window or busy hallway. · Use files, planners, and other tools to keep you organized. · Limit use of alcohol, and do not use illegal drugs. People with ADHD tend to become addicted more easily than others. Tell your doctor if you need help to quit. Counseling, support groups, and sometimes medicines can help you stay free of alcohol or drugs. · Get at least 30 minutes of physical activity on most days of the week. Exercise has been shown to help people cope with ADHD. Walking is a good choice. You also may want to do other activities, such as running, swimming, cycling, or playing tennis or team sports. When should you call for help? Watch closely for changes in your health, and be sure to contact your doctor if: 
  · You feel sad a lot or cry all the time.   · You have trouble sleeping, or you sleep too much.  
  · You find it hard to concentrate, make decisions, or remember things.  
  · You change how you normally eat.  
  · You feel guilty for no reason. Where can you learn more? Go to http://charissa-maria elena.info/. Enter B196 in the search box to learn more about \"Attention Deficit Hyperactivity Disorder (ADHD) in Adults: Care Instructions. \" Current as of: September 11, 2018 Content Version: 11.9 © 5209-4966 [employer]. Care instructions adapted under license by [employer] (which disclaims liability or warranty for this information). If you have questions about a medical condition or this instruction, always ask your healthcare professional. [address] any warranty or liability for your use of this information.

## 2019-02-05 NOTE — PROGRESS NOTES
Immunization/s administered on 2/5/2019 by Avel Miramontes LPN per Becca Wheeler NP with patients consent signed. Patient tolerated procedure well. No reactions noted. B 12 injection.

## 2019-02-05 NOTE — PROGRESS NOTES
Aleksey Young  Identified pt with two pt identifiers(name and ). Chief Complaint Patient presents with  Medication Refill Rm 7  
 Cough 1. Have you been to the ER, urgent care clinic since your last visit?n   Hospitalized since your last visit? n 
 
2. Have you seen or consulted any or health care providers outside of the [address] [place] [address] since your last visit? Include any pap smears or colon screening. n 
 
 
Advance Care Planning In the event something were to happen to you and you were unable to speak on your behalf, do you have an Advance Directive/ Living Will in place stating your wishes? NO If yes, do we have a copy on file NO If no, would you like information NO Medication reconciliation up to date and corrected with patient at this time. Today's provider has been notified of reason for visit, vitals and flowsheets obtained on patients. Reviewed record in preparation for visit, huddled with provider and have obtained necessary documentation. There are no preventive care reminders to display for this patient. Wt Readings from Last 3 Encounters:  
19 258 lb 4.8 oz (117.2 kg) 10/22/18 251 lb 6.4 oz (114 kg) 18 250 lb 12.8 oz (113.8 kg) Temp Readings from Last 3 Encounters:  
19 98.1 °F (36.7 °C) (Oral) 10/22/18 98.7 °F (37.1 °C) (Oral) 18 97.1 °F (36.2 °C) (Oral) BP Readings from Last 3 Encounters:  
19 140/80  
10/22/18 124/82  
18 (!) 141/97 Pulse Readings from Last 3 Encounters:  
19 91  
10/22/18 73  
18 85 Vitals:  
 19 1512 BP: 140/80 Pulse: 91  
Resp: 20 Temp: 98.1 °F (36.7 °C) TempSrc: Oral  
SpO2: 95% Weight: 258 lb 4.8 oz (117.2 kg) Height: 6' 3\" (1.905 m) PainSc:   0 - No pain Learning Assessment: 
:  
 
Learning Assessment 2015 PRIMARY LEARNER Patient PRIMARY LANGUAGE ENGLISH  
LEARNER PREFERENCE PRIMARY OTHER (COMMENT) ANSWERED BY pt  
RELATIONSHIP SELF Depression Screening: 
:  
 
PHQ over the last two weeks 2/5/2019 Little interest or pleasure in doing things Not at all Feeling down, depressed, irritable, or hopeless Not at all Total Score PHQ 2 0 Trouble falling or staying asleep, or sleeping too much - Feeling tired or having little energy - Poor appetite, weight loss, or overeating - Feeling bad about yourself - or that you are a failure or have let yourself or your family down - Trouble concentrating on things such as school, work, reading, or watching TV - Moving or speaking so slowly that other people could have noticed; or the opposite being so fidgety that others notice - Thoughts of being better off dead, or hurting yourself in some way -  
PHQ 9 Score - How difficult have these problems made it for you to do your work, take care of your home and get along with others - No flowsheet data found. Fall Risk Assessment: 
:  
 
No flowsheet data found. Abuse Screening: 
:  
 
Abuse Screening Questionnaire 8/24/2018 Do you ever feel afraid of your partner? Serjio Hutton Are you in a relationship with someone who physically or mentally threatens you? Serjio Hutton Is it safe for you to go home? Y  
 
 
ADL Screening: 
:  
 
ADL Assessment 2/5/2019 Feeding yourself No Help Needed Getting from bed to chair No Help Needed Getting dressed No Help Needed Bathing or showering No Help Needed Walk across the room (includes cane/walker) No Help Needed Using the telphone No Help Needed Taking your medications No Help Needed Preparing meals No Help Needed Managing money (expenses/bills) No Help Needed Moderately strenuous housework (laundry) No Help Needed Shopping for personal items (toiletries/medicines) No Help Needed Shopping for groceries No Help Needed Driving No Help Needed Climbing a flight of stairs No Help Needed Getting to places beyond walking distances No Help Needed BMI: 
Weight Metrics 2/5/2019 10/22/2018 8/24/2018 8/10/2018 8/1/2018 7/27/2018 7/13/2018 Weight 258 lb 4.8 oz 251 lb 6.4 oz 250 lb 12.8 oz 248 lb 9.6 oz 252 lb 12.8 oz 252 lb 14.4 oz 252 lb 9.6 oz BMI 32.29 kg/m2 30.2 kg/m2 30.13 kg/m2 29.87 kg/m2 30.37 kg/m2 30.38 kg/m2 30.35 kg/m2 Medication reconciliation up to date and corrected with patient at this time.

## 2019-04-22 DIAGNOSIS — E78.00 HYPERCHOLESTEROLEMIA: ICD-10-CM

## 2019-04-22 DIAGNOSIS — F43.23 ADJUSTMENT REACTION WITH ANXIETY AND DEPRESSION: ICD-10-CM

## 2019-04-22 DIAGNOSIS — E03.9 HYPOTHYROIDISM, UNSPECIFIED TYPE: ICD-10-CM

## 2019-04-22 NOTE — TELEPHONE ENCOUNTER
PCP: Charly Tripp NP    Last appt: 2/5/2019  Future Appointments   Date Time Provider Shahram Corbetti   4/30/2019  4:30 PM Charly Tripp NP [facility]       Requested Prescriptions     Pending Prescriptions Disp Refills    atorvastatin (LIPITOR) 20 mg tablet [Pharmacy Med Name: ATORVASTATIN 20MG TABLETS] 90 Tab 0     Sig: TAKE 1 TABLET BY MOUTH DAILY    buPROPion SR (WELLBUTRIN SR) 150 mg SR tablet [Pharmacy Med Name: BUPROPION SR 150MG TABLETS (12 H)] 180 Tab 0     Sig: TAKE 1 TABLET BY MOUTH TWICE DAILY    levothyroxine (SYNTHROID) 50 mcg tablet [Pharmacy Med Name: LEVOTHYROXINE 0.05MG (50MCG) TAB] 90 Tab 0     Sig: TAKE 1 TABLET BY MOUTH DAILY BEFORE BREAKFAST       Prior labs and Blood pressures:  BP Readings from Last 3 Encounters:   02/05/19 140/80   10/22/18 124/82   08/24/18 (!) 141/97     Lab Results   Component Value Date/Time    Sodium 141 06/27/2018 11:19 AM    Potassium 4.4 06/27/2018 11:19 AM    Chloride 106 06/27/2018 11:19 AM    CO2 19 (L) 06/27/2018 11:19 AM    Anion gap 10 01/04/2016 05:59 PM    Glucose 87 06/27/2018 11:19 AM    BUN 10 06/27/2018 11:19 AM    Creatinine 0.95 06/27/2018 11:19 AM    BUN/Creatinine ratio 11 06/27/2018 11:19 AM    GFR est  06/27/2018 11:19 AM    GFR est non-AA 99 06/27/2018 11:19 AM    Calcium 9.2 06/27/2018 11:19 AM     Lab Results   Component Value Date/Time    Hemoglobin A1c 5.1 06/27/2018 11:19 AM     Lab Results   Component Value Date/Time    Cholesterol, total 163 10/22/2018 12:32 PM    HDL Cholesterol 51 10/22/2018 12:32 PM    LDL, calculated 83 10/22/2018 12:32 PM    VLDL, calculated 29 10/22/2018 12:32 PM    Triglyceride 147 10/22/2018 12:32 PM     Lab Results   Component Value Date/Time    VITAMIN D, 25-HYDROXY 37.9 10/22/2018 12:32 PM       Lab Results   Component Value Date/Time    TSH 2.210 10/22/2018 12:32 PM

## 2019-04-23 RX ORDER — BUPROPION HYDROCHLORIDE 150 MG/1
TABLET, EXTENDED RELEASE ORAL
Qty: 180 TAB | Refills: 0 | Status: SHIPPED | OUTPATIENT
Start: 2019-04-23 | End: 2019-06-25 | Stop reason: DRUGHIGH

## 2019-04-23 RX ORDER — ATORVASTATIN CALCIUM 20 MG/1
TABLET, FILM COATED ORAL
Qty: 90 TAB | Refills: 0 | Status: SHIPPED | OUTPATIENT
Start: 2019-04-23 | End: 2019-06-25 | Stop reason: SDUPTHER

## 2019-04-23 RX ORDER — LEVOTHYROXINE SODIUM 50 UG/1
TABLET ORAL
Qty: 90 TAB | Refills: 0 | Status: SHIPPED | OUTPATIENT
Start: 2019-04-23 | End: 2019-06-25 | Stop reason: SDUPTHER

## 2019-04-30 ENCOUNTER — OFFICE VISIT (OUTPATIENT)
Dept: FAMILY MEDICINE CLINIC | Age: 44
End: 2019-04-30

## 2019-04-30 VITALS
OXYGEN SATURATION: 97 % | HEIGHT: 75 IN | DIASTOLIC BLOOD PRESSURE: 78 MMHG | TEMPERATURE: 98.1 F | WEIGHT: 257.7 LBS | HEART RATE: 69 BPM | BODY MASS INDEX: 32.04 KG/M2 | RESPIRATION RATE: 20 BRPM | SYSTOLIC BLOOD PRESSURE: 138 MMHG

## 2019-04-30 DIAGNOSIS — E53.8 VITAMIN B12 DEFICIENCY: ICD-10-CM

## 2019-04-30 DIAGNOSIS — F98.8 ADD (ATTENTION DEFICIT DISORDER) WITHOUT HYPERACTIVITY: Primary | ICD-10-CM

## 2019-04-30 DIAGNOSIS — G89.29 OTHER CHRONIC PAIN: ICD-10-CM

## 2019-04-30 DIAGNOSIS — R05.9 COUGH: ICD-10-CM

## 2019-04-30 DIAGNOSIS — J45.901 BRONCHITIS, ALLERGIC, UNSPECIFIED ASTHMA SEVERITY, WITH ACUTE EXACERBATION: ICD-10-CM

## 2019-04-30 RX ORDER — DEXTROAMPHETAMINE SACCHARATE, AMPHETAMINE ASPARTATE MONOHYDRATE, DEXTROAMPHETAMINE SULFATE AND AMPHETAMINE SULFATE 7.5; 7.5; 7.5; 7.5 MG/1; MG/1; MG/1; MG/1
30 CAPSULE, EXTENDED RELEASE ORAL DAILY
Qty: 30 CAP | Refills: 0 | Status: SHIPPED | OUTPATIENT
Start: 2019-05-30 | End: 2019-06-25 | Stop reason: SDUPTHER

## 2019-04-30 RX ORDER — DEXTROAMPHETAMINE SACCHARATE, AMPHETAMINE ASPARTATE MONOHYDRATE, DEXTROAMPHETAMINE SULFATE AND AMPHETAMINE SULFATE 7.5; 7.5; 7.5; 7.5 MG/1; MG/1; MG/1; MG/1
30 CAPSULE, EXTENDED RELEASE ORAL DAILY
Qty: 30 CAP | Refills: 0 | Status: SHIPPED | OUTPATIENT
Start: 2019-06-29 | End: 2019-06-25 | Stop reason: SDUPTHER

## 2019-04-30 RX ORDER — DEXTROAMPHETAMINE SACCHARATE, AMPHETAMINE ASPARTATE MONOHYDRATE, DEXTROAMPHETAMINE SULFATE AND AMPHETAMINE SULFATE 7.5; 7.5; 7.5; 7.5 MG/1; MG/1; MG/1; MG/1
30 CAPSULE, EXTENDED RELEASE ORAL DAILY
Qty: 30 CAP | Refills: 0 | Status: SHIPPED | OUTPATIENT
Start: 2019-04-30 | End: 2019-05-29

## 2019-04-30 RX ORDER — DEXTROAMPHETAMINE SULFATE, DEXTROAMPHETAMINE SACCHARATE, AMPHETAMINE SULFATE AND AMPHETAMINE ASPARTATE 7.5; 7.5; 7.5; 7.5 MG/1; MG/1; MG/1; MG/1
CAPSULE, EXTENDED RELEASE ORAL
Refills: 0 | COMMUNITY
Start: 2019-04-06 | End: 2019-04-30 | Stop reason: SDUPTHER

## 2019-04-30 RX ORDER — MONTELUKAST SODIUM 10 MG/1
10 TABLET ORAL DAILY
Qty: 30 TAB | Refills: 2 | Status: SHIPPED | OUTPATIENT
Start: 2019-04-30 | End: 2019-06-25 | Stop reason: ALTCHOICE

## 2019-04-30 RX ORDER — BENZONATATE 200 MG/1
200 CAPSULE ORAL
Qty: 30 CAP | Refills: 0 | Status: SHIPPED | OUTPATIENT
Start: 2019-04-30 | End: 2019-05-07

## 2019-04-30 RX ORDER — DEXTROAMPHETAMINE SACCHARATE, AMPHETAMINE ASPARTATE, DEXTROAMPHETAMINE SULFATE AND AMPHETAMINE SULFATE 7.5; 7.5; 7.5; 7.5 MG/1; MG/1; MG/1; MG/1
30 TABLET ORAL DAILY
Qty: 30 TAB | Refills: 0 | Status: SHIPPED | OUTPATIENT
Start: 2019-04-30 | End: 2019-05-29

## 2019-04-30 RX ORDER — CYANOCOBALAMIN 1000 UG/ML
1000 INJECTION, SOLUTION INTRAMUSCULAR; SUBCUTANEOUS ONCE
Qty: 1 ML | Refills: 0
Start: 2019-04-30 | End: 2019-04-30

## 2019-04-30 RX ORDER — AZITHROMYCIN 250 MG/1
TABLET, FILM COATED ORAL
Qty: 6 TAB | Refills: 0 | Status: SHIPPED | OUTPATIENT
Start: 2019-04-30 | End: 2019-05-05

## 2019-04-30 RX ORDER — HYDROCODONE BITARTRATE AND ACETAMINOPHEN 10; 325 MG/1; MG/1
1 TABLET ORAL
Qty: 90 TAB | Refills: 0 | Status: SHIPPED | OUTPATIENT
Start: 2019-04-30 | End: 2019-06-25 | Stop reason: SDUPTHER

## 2019-04-30 RX ORDER — DEXTROAMPHETAMINE SACCHARATE, AMPHETAMINE ASPARTATE, DEXTROAMPHETAMINE SULFATE AND AMPHETAMINE SULFATE 7.5; 7.5; 7.5; 7.5 MG/1; MG/1; MG/1; MG/1
TABLET ORAL
Refills: 0 | COMMUNITY
Start: 2019-04-06 | End: 2019-04-30 | Stop reason: SDUPTHER

## 2019-04-30 RX ORDER — DEXTROAMPHETAMINE SACCHARATE, AMPHETAMINE ASPARTATE, DEXTROAMPHETAMINE SULFATE AND AMPHETAMINE SULFATE 7.5; 7.5; 7.5; 7.5 MG/1; MG/1; MG/1; MG/1
30 TABLET ORAL DAILY
Qty: 30 TAB | Refills: 0 | Status: SHIPPED | OUTPATIENT
Start: 2019-06-29 | End: 2019-06-25 | Stop reason: DRUGHIGH

## 2019-04-30 RX ORDER — ALBUTEROL SULFATE 90 UG/1
2 AEROSOL, METERED RESPIRATORY (INHALATION)
Qty: 1 INHALER | Refills: 2 | Status: SHIPPED | OUTPATIENT
Start: 2019-04-30 | End: 2019-10-18 | Stop reason: ALTCHOICE

## 2019-04-30 RX ORDER — DEXTROAMPHETAMINE SACCHARATE, AMPHETAMINE ASPARTATE, DEXTROAMPHETAMINE SULFATE AND AMPHETAMINE SULFATE 7.5; 7.5; 7.5; 7.5 MG/1; MG/1; MG/1; MG/1
30 TABLET ORAL DAILY
Qty: 30 TAB | Refills: 0 | Status: SHIPPED | OUTPATIENT
Start: 2019-05-30 | End: 2019-06-25 | Stop reason: DRUGHIGH

## 2019-04-30 NOTE — PROGRESS NOTES
Chief Complaint   Patient presents with    Medication Refill     Rm 7    Cough         HPI:  The patient is a 44 y.o. male who presents today for a follow up appointment. No hospital, ER or specialist visits since last primary care visit except as noted below. ADD:  Pt restarted on Adderall at his last visit. He reports his ADD symptoms were well controlled but now he feels it makes him jittery. He would like to try a different ADD medication, he was changed to Vyvanse on 8/1/2018. He does not feel it works as well as Adderall. Now he is taking Adderall which he feels is wearing off. He has returned to work and his working very long shifts. He would like to discuss another option. He has continues to struggle with being symptomatic with ADD as well as depression as well as returning to work. These variables have made effective treatment options challenging thus far. He has been taking Adderall 20mg XR w/ Adderall 20mg in PM.  He had been on Adderall 30mg XR but was changed incidentally while I was out of the office. He has noticed a difference and would like to switch back to 30mg XR. Depression:  He has been taking Wellbutrin BID with great improvement. He does report fatigue, states he previous PCP gave him a Vit B12 shot with good effectiveness. He reports considerably worsening depression this week, he reports 2-3 episodes of SI, he denies a plan and felt \"it would be ok if he did not wake up tomorrow\". At his last visit on 6/29/2018, he was started on Lamictal 25mg daily. He has now increased to 50mg daily and  reports his depressive symptoms are continuing to improve. He ran out of his Lamictal for approximately 1 week. He felt the Wellbutrin and Lamictal were managing his symptoms well. He had diarrhea and felt the Lamictal was the cause of this, therefore he stopped the Lamictal.  He reports his diarrhea resolved.   He is now taking Wellbutrin BID and 25mg Lamictal daily with good effectiveness. He denies any adverse effects with both of these medications. Chronic Pain:  He continues to have joint and back pain secondary to working as [employer] as a  and . He is taking Hydrocodone in the evenings PRN. He is out of this and would like a refill today. HTN:  He stopped taking Lisinopril and was normotensive at his last visit on 6/29/2018. However he is still not taking his medication but his blood pressure has increased. He denies CP/SOB/palpitations, HECTOR, and HA. He has not been taking his Lisinopril and reports that he drank a red bull energy drink before he came to his visit. He was normotensive at his last visit. However his blood pressure continues to be labile. He is just getting over a cold and took Dayquil today. BP Readings from Last 3 Encounters:   02/05/19 140/80   10/22/18 124/82   08/24/18 (!) 141/97     Review of Systems  A comprehensive review of systems was negative except for that written in the HPI.     Patient Active Problem List   Diagnosis Code    Adjustment reaction with anxiety and depression F43.23    Vitamin D deficiency E55.9    Hypercholesterolemia E78.00    Hypertension I10    GERD (gastroesophageal reflux disease) K21.9    ADD (attention deficit disorder) without hyperactivity F98.8    Moderate major depression ([address] F32.1    Vitamin B12 deficiency E53.8    Hypothyroidism E03.9    Thyroid disease E07.9    Other chronic pain G89.29       Past Medical History:   Diagnosis Date    ADD (attention deficit disorder) without hyperactivity 6/7/2018    Adjustment reaction with anxiety and depression     Attention deficit hyperactivity disorder (ADHD), predominantly inattentive type 6/7/2018    Cancer ([address]     testicular     Cancer ([address] 2010    Testicular    Community acquired pneumonia     Depression     GERD (gastroesophageal reflux disease)     Headache     Hypercholesterolemia     Hypertension     Thyroid disease 2017    Vitamin D deficiency        Past Surgical History:   Procedure Laterality Date    HX ORCHIECTOMY Right 2010    HX UROLOGICAL      removed on e testicle    HX WISDOM TEETH EXTRACTION         Social History     Tobacco Use    Smoking status: Former Smoker     Packs/day: 1.00     Years: 8.00     Pack years: 8.00     Types: Cigarettes     Last attempt to quit:      Years since quittin.3    Smokeless tobacco: Never Used   Substance Use Topics    Alcohol use: Yes     Alcohol/week: 6.0 oz     Types: 24 Cans of beer per week     Frequency: Monthly or less     Drinks per session: 1 or 2     Binge frequency: Less than monthly    Drug use: No       Family History   Problem Relation Age of Onset    Cancer Mother         Ovarian    Hypertension Mother     Cancer Father         Throat    Hypertension Father     Arrhythmia Brother         Afib    No Known Problems Maternal Grandmother     No Known Problems Maternal Grandfather     No Known Problems Paternal Grandmother     No Known Problems Paternal Grandfather            Current Outpatient Medications on File Prior to Visit   Medication Sig Dispense Refill    atorvastatin (LIPITOR) 20 mg tablet TAKE 1 TABLET BY MOUTH DAILY 90 Tab 0    buPROPion SR (WELLBUTRIN SR) 150 mg SR tablet TAKE 1 TABLET BY MOUTH TWICE DAILY 180 Tab 0    levothyroxine (SYNTHROID) 50 mcg tablet TAKE 1 TABLET BY MOUTH DAILY BEFORE BREAKFAST 90 Tab 0    lamoTRIgine (LAMICTAL) 25 mg tablet Take 1 Tab by mouth daily. 90 Tab 1    multivitamin (ONE A DAY) tablet Take 1 Tab by mouth daily. No current facility-administered medications on file prior to visit.         No Known Allergies    PE:  Visit Vitals  /78 (BP 1 Location: Right arm, BP Patient Position: Sitting)   Pulse 69   Temp 98.1 °F (36.7 °C) (Oral)   Resp 20   Ht 6' 3\" (1.905 m)   Wt 257 lb 11.2 oz (116.9 kg)   SpO2 97%   BMI 32.21 kg/m²       Gen: alert, oriented, no acute distress  Head: normocephalic, atraumatic  Ears: external auditory canals clear, TMs without erythema or effusion  Eyes: pupils equal round reactive to light, sclera clear, conjunctiva clear  Oral: moist mucus membranes, no oral lesions, no pharyngeal inflammation or exudate  Neck: symmetric normal sized thyroid, no carotid bruits, no jugular vein distention  Resp: no increase work of breathing, lungs clear to ausculation bilaterally, no wheezing, rales or rhonchi  CV: S1, S2 normal.  No murmurs, rubs, or gallops. Abd: soft, not tender, not distended. No hepatosplenomegaly. Normal bowel sounds. No hernias. No abdominal or renal bruits. Neuro: cranial nerves intact, normal strength and movement in all extremities, reflexes and sensation intact and symmetric. Skin: no lesion or rash  Extremities: no cyanosis or edema    No results found for this visit on 04/30/19. Assessment/Plan:    ICD-10-CM ICD-9-CM    1. ADD (attention deficit disorder) without hyperactivity F98.8 314.00 amphetamine-dextroamphetamine XR (ADDERALL XR) 30 mg XR capsule      amphetamine-dextroamphetamine XR (ADDERALL XR) 30 mg XR capsule      amphetamine-dextroamphetamine XR (ADDERALL XR) 30 mg XR capsule      dextroamphetamine-amphetamine (ADDERALL) 30 mg tablet      dextroamphetamine-amphetamine (ADDERALL) 30 mg tablet      dextroamphetamine-amphetamine (ADDERALL) 30 mg tablet   2. Other chronic pain G89.29 338.29 HYDROcodone-acetaminophen (NORCO)  mg tablet   3. Vitamin B12 deficiency E53.8 266.2 VITAMIN B12 INJECTION      THER/PROPH/DIAG INJECTION, SUBCUT/IM      cyanocobalamin (VITAMIN B-12) 1,000 mcg/mL injection   4. Bronchitis, allergic, unspecified asthma severity, with acute exacerbation J45.901 493.92 azithromycin (ZITHROMAX) 250 mg tablet      montelukast (SINGULAIR) 10 mg tablet      albuterol (PROVENTIL HFA, VENTOLIN HFA, PROAIR HFA) 90 mcg/actuation inhaler      benzonatate (TESSALON) 200 mg capsule   5.  Cough R05 786.2 azithromycin (ZITHROMAX) 250 mg tablet      montelukast (SINGULAIR) 10 mg tablet      albuterol (PROVENTIL HFA, VENTOLIN HFA, PROAIR HFA) 90 mcg/actuation inhaler      benzonatate (TESSALON) 200 mg capsule     Follow-up and Dispositions    · Return in about 3 months (around 7/30/2019) for Medication Check, ADHD/ADD. reviewed diet, exercise and weight control  reviewed medications and side effects in detail    lose weight, increase physical activity, call if any problems    Health Maintenance reviewed - reviewed, UTD. Recommended healthy diet low in carbohydrates, fats, sodium and cholesterol. Recommended regular cardiovascular exercise 3-6 times per week for 30-60 minutes daily. Chart is reviewed and updated today in the office. Records requested for other providers patient has seen and is currently seeing. Patient was offered a choice/choices in the treatment plan today. Patient expresses understanding of the plan and agrees with recommendations. Verbal and written instructions (see AVS) provided. See patient instructions for more. Patient expresses understanding of diagnosis and treatment plan.

## 2019-04-30 NOTE — LETTER
NOTIFICATION RETURN TO WORK / SCHOOL 
 
4/30/2019 6:30 PM 
 
Mr. Kelvin George 
[address] [place] [phone] To Whom It May Concern: 
 
Kelvin George is currently under the care of [facility]. He will return to work/school on: 5/1/2019 If there are questions or concerns please have the patient contact our office. Sincerely, Joe Husain NP

## 2019-04-30 NOTE — PROGRESS NOTES
Radames Swenson  Identified pt with two pt identifiers(name and ). Chief Complaint   Patient presents with    Medication Refill     Rm 7    Cough       1. Have you been to the ER, urgent care clinic since your last visit?n   Hospitalized since your last visit? n     2. Have you seen or consulted any other health care providers outside of the [address] [place] [address] since your last visit? Include any pap smears or colon screening. n       Advance Care Planning    In the event something were to happen to you and you were unable to speak on your behalf, do you have an Advance Directive/ Living Will in place stating your wishes? NO    If yes, do we have a copy on file NO    If no, would you like information NO    Medication reconciliation up to date and corrected with patient at this time. Today's provider has been notified of reason for visit, vitals and flowsheets obtained on patients. Reviewed record in preparation for visit, huddled with provider and have obtained necessary documentation. There are no preventive care reminders to display for this patient.     Wt Readings from Last 3 Encounters:   19 257 lb 11.2 oz (116.9 kg)   19 258 lb 4.8 oz (117.2 kg)   10/22/18 251 lb 6.4 oz (114 kg)     Temp Readings from Last 3 Encounters:   19 98.1 °F (36.7 °C) (Oral)   19 98.1 °F (36.7 °C) (Oral)   10/22/18 98.7 °F (37.1 °C) (Oral)     BP Readings from Last 3 Encounters:   19 138/78   19 140/80   10/22/18 124/82     Pulse Readings from Last 3 Encounters:   19 69   19 91   10/22/18 73     Vitals:    19 1633   BP: 138/78   Pulse: 69   Resp: 20   Temp: 98.1 °F (36.7 °C)   TempSrc: Oral   SpO2: 97%   Weight: 257 lb 11.2 oz (116.9 kg)   Height: 6' 3\" (1.905 m)   PainSc:   0 - No pain         Learning Assessment:  :     Learning Assessment 2015   PRIMARY LEARNER Patient   PRIMARY LANGUAGE ENGLISH   LEARNER PREFERENCE PRIMARY OTHER (COMMENT)   ANSWERED BY pt   RELATIONSHIP SELF       Depression Screening:  :     3 most recent PHQ Screens 2/5/2019   Little interest or pleasure in doing things Not at all   Feeling down, depressed, irritable, or hopeless Not at all   Total Score PHQ 2 0   Trouble falling or staying asleep, or sleeping too much -   Feeling tired or having little energy -   Poor appetite, weight loss, or overeating -   Feeling bad about yourself - or that you are a failure or have let yourself or your family down -   Trouble concentrating on things such as school, work, reading, or watching TV -   Moving or speaking so slowly that other people could have noticed; or the opposite being so fidgety that others notice -   Thoughts of being better off dead, or hurting yourself in some way -   PHQ 9 Score -   How difficult have these problems made it for you to do your work, take care of your home and get along with others -       No flowsheet data found. Fall Risk Assessment:  :     No flowsheet data found. Abuse Screening:  :     Abuse Screening Questionnaire 8/24/2018   Do you ever feel afraid of your partner? N   Are you in a relationship with someone who physically or mentally threatens you? N   Is it safe for you to go home?  Y       ADL Screening:  :     ADL Assessment 2/5/2019   Feeding yourself No Help Needed   Getting from bed to chair No Help Needed   Getting dressed No Help Needed   Bathing or showering No Help Needed   Walk across the room (includes cane/walker) No Help Needed   Using the telphone No Help Needed   Taking your medications No Help Needed   Preparing meals No Help Needed   Managing money (expenses/bills) No Help Needed   Moderately strenuous housework (laundry) No Help Needed   Shopping for personal items (toiletries/medicines) No Help Needed   Shopping for groceries No Help Needed   Driving No Help Needed   Climbing a flight of stairs No Help Needed   Getting to places beyond walking distances No Help Needed BMI:  Weight Metrics 4/30/2019 2/5/2019 10/22/2018 8/24/2018 8/10/2018 8/1/2018 7/27/2018   Weight 257 lb 11.2 oz 258 lb 4.8 oz 251 lb 6.4 oz 250 lb 12.8 oz 248 lb 9.6 oz 252 lb 12.8 oz 252 lb 14.4 oz   BMI 32.21 kg/m2 32.29 kg/m2 30.2 kg/m2 30.13 kg/m2 29.87 kg/m2 30.37 kg/m2 30.38 kg/m2           Medication reconciliation up to date and corrected with patient at this time.

## 2019-04-30 NOTE — PATIENT INSTRUCTIONS
Cough: Care Instructions  Your Care Instructions    A cough is your body's response to something that bothers your throat or airways. Many things can cause a cough. You might cough because of a cold or the flu, bronchitis, or asthma. Smoking, postnasal drip, allergies, and stomach acid that backs up into your throat also can cause coughs. A cough is a symptom, not a disease. Most coughs stop when the cause, such as a cold, goes away. You can take a few steps at home to cough less and feel better. Follow-up care is a key part of your treatment and safety. Be sure to make and go to all appointments, and call your doctor if you are having problems. It's also a good idea to know your test results and keep a list of the medicines you take. How can you care for yourself at home? · Drink lots of water and other fluids. This helps thin the mucus and soothes a dry or sore throat. Honey or lemon juice in hot water or tea may ease a dry cough. · Take cough medicine as directed by your doctor. · Prop up your head on pillows to help you breathe and ease a dry cough. · Try cough drops to soothe a dry or sore throat. Cough drops don't stop a cough. Medicine-flavored cough drops are no better than candy-flavored drops or hard candy. · Do not smoke. Avoid secondhand smoke. If you need help quitting, talk to your doctor about stop-smoking programs and medicines. These can increase your chances of quitting for good. When should you call for help? Call 911 anytime you think you may need emergency care.  For example, call if:    · You have severe trouble breathing.    Call your doctor now or seek immediate medical care if:    · You cough up blood.     · You have new or worse trouble breathing.     · You have a new or higher fever.     · You have a new rash.    Watch closely for changes in your health, and be sure to contact your doctor if:    · You cough more deeply or more often, especially if you notice more mucus or a change in the color of your mucus.     · You have new symptoms, such as a sore throat, an earache, or sinus pain.     · You do not get better as expected. Where can you learn more? Go to http://charissa-maria elena.info/. Enter D279 in the search box to learn more about \"Cough: Care Instructions. \"  Current as of: September 5, 2018  Content Version: 11.9  © 9671-6386 [employer]. Care instructions adapted under license by [employer] (which disclaims liability or warranty for this information). If you have questions about a medical condition or this instruction, always ask your healthcare professional. [address] any warranty or liability for your use of this information. Learning About Leukotriene Modifiers  Introduction    Leukotriene (say \"loo-koh-TRY-een\") modifiers are drugs used for asthma and hay fever. They treat asthma symptoms in adults. And in children, they improve how well the lungs work. They are not used to treat asthma attacks. They also reduce symptoms of hay fever. These include sneezing and a runny or stuffy nose. Examples  · Montelukast (Singulair)  · Zafirlukast (Accolate)  · Zileuton (Zyflo)  Side effects  · Vomiting. · Diarrhea. · A headache. You may have other side effects or reactions. Check the information that comes with your medicine. What to know about taking these medicines  · These drugs may help if your asthma is caused by exercise, aspirin, or allergies. · You may need to have blood tests during the first 6 months of treatment. The tests check for liver damage. · Take your medicines exactly as prescribed. Call your doctor if you think you are having a problem with your medicine. · Check with your doctor or pharmacist before you use any other medicines. This includes over-the-counter drugs. Make sure your doctor knows all of the medicines you take. This includes vitamins, herbs, and supplements.  Taking some drugs together can cause problems. Where can you learn more? Go to http://charissa-maria elena.info/. Enter F101 in the search box to learn more about \"Learning About Leukotriene Modifiers. \"  Current as of: September 5, 2018  Content Version: 11.9  © 0535-1595 [employer]. Care instructions adapted under license by [employer] (which disclaims liability or warranty for this information). If you have questions about a medical condition or this instruction, always ask your healthcare professional. [address] any warranty or liability for your use of this information.

## 2019-06-25 ENCOUNTER — OFFICE VISIT (OUTPATIENT)
Dept: FAMILY MEDICINE CLINIC | Age: 44
End: 2019-06-25

## 2019-06-25 VITALS
SYSTOLIC BLOOD PRESSURE: 134 MMHG | RESPIRATION RATE: 20 BRPM | WEIGHT: 258.9 LBS | DIASTOLIC BLOOD PRESSURE: 84 MMHG | OXYGEN SATURATION: 97 % | TEMPERATURE: 96.4 F | HEIGHT: 75 IN | BODY MASS INDEX: 32.19 KG/M2 | HEART RATE: 85 BPM

## 2019-06-25 DIAGNOSIS — E03.9 HYPOTHYROIDISM, UNSPECIFIED TYPE: ICD-10-CM

## 2019-06-25 DIAGNOSIS — F32.1 MODERATE MAJOR DEPRESSION (HCC): ICD-10-CM

## 2019-06-25 DIAGNOSIS — G89.29 OTHER CHRONIC PAIN: ICD-10-CM

## 2019-06-25 DIAGNOSIS — N52.35 ERECTILE DYSFUNCTION FOLLOWING RADIATION THERAPY: ICD-10-CM

## 2019-06-25 DIAGNOSIS — E55.9 VITAMIN D DEFICIENCY: ICD-10-CM

## 2019-06-25 DIAGNOSIS — F43.23 ADJUSTMENT REACTION WITH ANXIETY AND DEPRESSION: ICD-10-CM

## 2019-06-25 DIAGNOSIS — F98.8 ADD (ATTENTION DEFICIT DISORDER) WITHOUT HYPERACTIVITY: Primary | ICD-10-CM

## 2019-06-25 DIAGNOSIS — E53.8 VITAMIN B12 DEFICIENCY: ICD-10-CM

## 2019-06-25 DIAGNOSIS — E78.00 HYPERCHOLESTEROLEMIA: ICD-10-CM

## 2019-06-25 PROBLEM — I10 HYPERTENSION: Status: RESOLVED | Noted: 2018-06-05 | Resolved: 2019-06-25

## 2019-06-25 RX ORDER — BUPROPION HYDROCHLORIDE 300 MG/1
300 TABLET ORAL
Qty: 90 TAB | Refills: 1 | Status: SHIPPED | OUTPATIENT
Start: 2019-06-25 | End: 2019-12-20

## 2019-06-25 RX ORDER — LEVOTHYROXINE SODIUM 50 UG/1
50 TABLET ORAL
Qty: 90 TAB | Refills: 1 | Status: SHIPPED | OUTPATIENT
Start: 2019-06-25 | End: 2020-02-26 | Stop reason: SDUPTHER

## 2019-06-25 RX ORDER — ATORVASTATIN CALCIUM 20 MG/1
20 TABLET, FILM COATED ORAL DAILY
Qty: 90 TAB | Refills: 1 | Status: SHIPPED | OUTPATIENT
Start: 2019-06-25 | End: 2020-02-26 | Stop reason: SDUPTHER

## 2019-06-25 RX ORDER — DEXTROAMPHETAMINE SACCHARATE, AMPHETAMINE ASPARTATE, DEXTROAMPHETAMINE SULFATE AND AMPHETAMINE SULFATE 7.5; 7.5; 7.5; 7.5 MG/1; MG/1; MG/1; MG/1
30 TABLET ORAL 2 TIMES DAILY
Qty: 60 TAB | Refills: 0 | Status: SHIPPED | OUTPATIENT
Start: 2019-08-24 | End: 2019-07-26 | Stop reason: DRUGHIGH

## 2019-06-25 RX ORDER — DEXTROAMPHETAMINE SACCHARATE, AMPHETAMINE ASPARTATE, DEXTROAMPHETAMINE SULFATE AND AMPHETAMINE SULFATE 7.5; 7.5; 7.5; 7.5 MG/1; MG/1; MG/1; MG/1
30 TABLET ORAL 2 TIMES DAILY
Qty: 60 TAB | Refills: 0 | Status: SHIPPED | OUTPATIENT
Start: 2019-07-25 | End: 2019-07-26 | Stop reason: DRUGHIGH

## 2019-06-25 RX ORDER — DEXTROAMPHETAMINE SACCHARATE, AMPHETAMINE ASPARTATE, DEXTROAMPHETAMINE SULFATE AND AMPHETAMINE SULFATE 7.5; 7.5; 7.5; 7.5 MG/1; MG/1; MG/1; MG/1
30 TABLET ORAL 2 TIMES DAILY
Qty: 60 TAB | Refills: 0 | Status: SHIPPED | OUTPATIENT
Start: 2019-06-25 | End: 2019-07-24

## 2019-06-25 RX ORDER — HYDROCODONE BITARTRATE AND ACETAMINOPHEN 10; 325 MG/1; MG/1
1 TABLET ORAL
Qty: 90 TAB | Refills: 0 | Status: SHIPPED | OUTPATIENT
Start: 2019-06-25 | End: 2019-07-26 | Stop reason: SDUPTHER

## 2019-06-25 RX ORDER — TADALAFIL 5 MG/1
5 TABLET ORAL
Qty: 30 TAB | Refills: 2 | Status: SHIPPED | OUTPATIENT
Start: 2019-06-25 | End: 2019-08-13 | Stop reason: SDUPTHER

## 2019-06-25 NOTE — LETTER
NOTIFICATION RETURN TO WORK / SCHOOL 
 
6/25/2019 3:02 PM 
 
Mr. Negrita Marie 
[facility] [address] [phone] To Whom It May Concern: 
 
Negrita Marie is currently under the care of [facility]. He will return to work on: 6/26/2019 If there are questions or concerns please have the patient contact our office. Sincerely, Claude Oliva NP

## 2019-06-25 NOTE — PROGRESS NOTES
Chief Complaint   Patient presents with    Medication Refill     Room 5         HPI:  The patient is a 44 y.o. male who presents today for a follow up appointment. No hospital, ER or specialist visits since last primary care visit except as noted below. ADD:  Pt restarted on Adderall at his last visit. He reports his ADD symptoms were well controlled but now he feels it makes him jittery. He would like to try a different ADD medication, he was changed to Vyvanse on 8/1/2018. He does not feel it works as well as Adderall. Now he is taking Adderall which he feels is wearing off. He has returned to work and his working very long shifts. He would like to discuss another option. He has continues to struggle with being symptomatic with ADD as well as depression as well as returning to work. These variables have made effective treatment options challenging thus far. He has been taking Adderall 20mg XR w/ Adderall 20mg in PM.  He had been on Adderall 30mg XR but was changed incidentally while I was out of the office. He has noticed a difference and would like to switch back to 30mg XR. He was switched back to 30mg XR. He feels the immediate release 30mg helps his symptoms at work more so than the XR. Denies adverse effects. Appetite is good. Weight is stable. Denies insomnia. Depression:  He has been taking Wellbutrin BID with great improvement. He does report fatigue, states he previous PCP gave him a Vit B12 shot with good effectiveness. He reports considerably worsening depression this week, he reports 2-3 episodes of SI, he denies a plan and felt \"it would be ok if he did not wake up tomorrow\". At his last visit on 6/29/2018, he was started on Lamictal 25mg daily. He has now increased to 50mg daily and  reports his depressive symptoms are continuing to improve. He ran out of his Lamictal for approximately 1 week. He felt the Wellbutrin and Lamictal were managing his symptoms well.   He had diarrhea and felt the Lamictal was the cause of this, therefore he stopped the Lamictal.  He reports his diarrhea resolved. He is now taking Wellbutrin BID with good effectiveness. He denies any adverse effects with both of these medications. 3 most recent PHQ Screens 2/5/2019   Little interest or pleasure in doing things Not at all   Feeling down, depressed, irritable, or hopeless Not at all   Total Score PHQ 2 0   Trouble falling or staying asleep, or sleeping too much -   Feeling tired or having little energy -   Poor appetite, weight loss, or overeating -   Feeling bad about yourself - or that you are a failure or have let yourself or your family down -   Trouble concentrating on things such as school, work, reading, or watching TV -   Moving or speaking so slowly that other people could have noticed; or the opposite being so fidgety that others notice -   Thoughts of being better off dead, or hurting yourself in some way -   PHQ 9 Score -   How difficult have these problems made it for you to do your work, take care of your home and get along with others -     Chronic Pain:  He continues to have joint and back pain secondary to working as [employer] as a  and . He is taking Hydrocodone in the evenings PRN. He is out of this and would like a refill today. He also has chronic pain s/p cancer treatment. HTN:  He stopped taking Lisinopril and was normotensive at his last visit on 6/29/2018. However he is still not taking his medication and is normotensive. He denies CP/SOB/palpitations, HECTOR, and HA. BP Readings from Last 3 Encounters:   06/25/19 134/84   04/30/19 138/78   02/05/19 140/80     Review of Systems  A comprehensive review of systems was negative except for that written in the HPI.     Patient Active Problem List   Diagnosis Code    Adjustment reaction with anxiety and depression F43.23    Vitamin D deficiency E55.9    Hypercholesterolemia E78.00    GERD (gastroesophageal reflux disease) K21.9    ADD (attention deficit disorder) without hyperactivity F98.8    Moderate major depression (HCC) F32.1    Vitamin B12 deficiency E53.8    Hypothyroidism E03.9    Thyroid disease E07.9    Other chronic pain G89.29    Erectile dysfunction following radiation therapy N52.35       Past Medical History:   Diagnosis Date    ADD (attention deficit disorder) without hyperactivity 2018    Adjustment reaction with anxiety and depression     Attention deficit hyperactivity disorder (ADHD), predominantly inattentive type 2018    Cancer ([facility]     testicular     Cancer ([facility] 2010    Testicular    Community acquired pneumonia     Depression     GERD (gastroesophageal reflux disease)     Headache     Hypercholesterolemia     Hypertension     Thyroid disease 2017    Vitamin D deficiency        Past Surgical History:   Procedure Laterality Date    HX ORCHIECTOMY Right 2010    HX UROLOGICAL      removed on e testicle    HX WISDOM TEETH EXTRACTION         Social History     Tobacco Use    Smoking status: Former Smoker     Packs/day: 1.00     Years: 8.00     Pack years: 8.00     Types: Cigarettes     Last attempt to quit:      Years since quittin.4    Smokeless tobacco: Never Used   Substance Use Topics    Alcohol use:  Yes     Alcohol/week: 6.0 oz     Types: 24 Cans of beer per week     Frequency: Monthly or less     Drinks per session: 1 or 2     Binge frequency: Less than monthly    Drug use: No       Family History   Problem Relation Age of Onset    Cancer Mother         Ovarian    Hypertension Mother     Cancer Father         Throat    Hypertension Father     Arrhythmia Brother         Afib    No Known Problems Maternal Grandmother     No Known Problems Maternal Grandfather     No Known Problems Paternal Grandmother     No Known Problems Paternal Grandfather        Outpatient Medications Marked as Taking for the 19 encounter (Office Visit) with Terry Mack NP   Medication Sig Dispense Refill    dextroamphetamine-amphetamine (ADDERALL) 30 mg tablet Take 1 Tab by mouth two (2) times a day for 29 days. Max Daily Amount: 2 Tabs. 60 Tab 0    [START ON 7/25/2019] dextroamphetamine-amphetamine (ADDERALL) 30 mg tablet Take 1 Tab by mouth two (2) times a day for 29 days. Max Daily Amount: 2 Tabs. 60 Tab 0    [START ON 8/24/2019] dextroamphetamine-amphetamine (ADDERALL) 30 mg tablet Take 1 Tab by mouth two (2) times a day for 29 days. Max Daily Amount: 2 Tabs. 60 Tab 0    HYDROcodone-acetaminophen (NORCO)  mg tablet Take 1 Tab by mouth every six (6) hours as needed for Pain for up to 90 days. Max Daily Amount: 4 Tabs. 90 Tab 0    tadalafil (CIALIS) 5 mg tablet Take 1 Tab by mouth daily as needed for Other (ED). 30 Tab 2    levothyroxine (SYNTHROID) 50 mcg tablet Take 1 Tab by mouth Daily (before breakfast). 90 Tab 1    atorvastatin (LIPITOR) 20 mg tablet Take 1 Tab by mouth daily. 90 Tab 1    buPROPion XL (WELLBUTRIN XL) 300 mg XL tablet Take 1 Tab by mouth every morning. 90 Tab 1    albuterol (PROVENTIL HFA, VENTOLIN HFA, PROAIR HFA) 90 mcg/actuation inhaler Take 2 Puffs by inhalation every four (4) hours as needed for Wheezing. 1 Inhaler 2       Current Outpatient Medications on File Prior to Visit   Medication Sig Dispense Refill    albuterol (PROVENTIL HFA, VENTOLIN HFA, PROAIR HFA) 90 mcg/actuation inhaler Take 2 Puffs by inhalation every four (4) hours as needed for Wheezing. 1 Inhaler 2    multivitamin (ONE A DAY) tablet Take 1 Tab by mouth daily. No current facility-administered medications on file prior to visit.         No Known Allergies    PE:  Visit Vitals  /84 (BP 1 Location: Right arm, BP Patient Position: Sitting)   Pulse 85   Temp 96.4 °F (35.8 °C) (Oral)   Resp 20   Ht 6' 3\" (1.905 m)   Wt 258 lb 14.4 oz (117.4 kg)   SpO2 97%   BMI 32.36 kg/m²       Gen: alert, oriented, no acute distress  Head: normocephalic, atraumatic  Ears: external auditory canals clear, TMs without erythema or effusion  Eyes: pupils equal round reactive to light, sclera clear, conjunctiva clear  Oral: moist mucus membranes, no oral lesions, no pharyngeal inflammation or exudate  Neck: symmetric normal sized thyroid, no carotid bruits, no jugular vein distention  Resp: no increase work of breathing, lungs clear to ausculation bilaterally, no wheezing, rales or rhonchi  CV: S1, S2 normal.  No murmurs, rubs, or gallops. Abd: soft, not tender, not distended. No hepatosplenomegaly. Normal bowel sounds. No hernias. No abdominal or renal bruits. Neuro: cranial nerves intact, normal strength and movement in all extremities, reflexes and sensation intact and symmetric. Skin: no lesion or rash  Extremities: no cyanosis or edema    No results found for this visit on 06/25/19. Assessment/Plan:    ICD-10-CM ICD-9-CM    1. ADD (attention deficit disorder) without hyperactivity F98.8 314.00 dextroamphetamine-amphetamine (ADDERALL) 30 mg tablet      dextroamphetamine-amphetamine (ADDERALL) 30 mg tablet      dextroamphetamine-amphetamine (ADDERALL) 30 mg tablet   2. Moderate major depression (HCC) F32.1 296.22 buPROPion XL (WELLBUTRIN XL) 300 mg XL tablet   3. Hypercholesterolemia E78.00 272.0 atorvastatin (LIPITOR) 20 mg tablet   4. Erectile dysfunction following radiation therapy N52.35 607.84 tadalafil (CIALIS) 5 mg tablet   5. Hypothyroidism, unspecified type E03.9 244.9 levothyroxine (SYNTHROID) 50 mcg tablet   6. Adjustment reaction with anxiety and depression F43.23 309.28    7. Vitamin D deficiency E55.9 268.9    8. Vitamin B12 deficiency E53.8 266.2    9. Other chronic pain G89.29 338.29 HYDROcodone-acetaminophen (NORCO)  mg tablet     Follow-up and Dispositions    · Return in about 3 months (around 9/25/2019) for Medication Check, ADHD/ADD, Depression, Chronic Pain, Hypothyroidism, Labs.        reviewed diet, exercise and weight control  reviewed medications and side effects in detail    Controlled substance plan and safety assessment:  Medication: Hydrocodone/Adderall  MME/day: 40   >= 50? no   >= 120? no          Naloxone Rx? no   appropriate and last checked on: 6/25/2019  Last Urine Toxicology: UTD, appropriate  Treatment agreement was signed by pt and myself on: 6/25/2018    24 hour opioid dose >120mg morphine equivalent/day:  [] Yes   [x] No  Benzodiazepines:  [] Yes   [x] No  Sleep apnea:  [] Yes   [x] No  Urine Toxicology Testing within last 6 months:  [x] Yes   [] No  History of or new aberrant medication taking behaviors:  [] Yes   [x] No    Controlled Substance Refills given  Date prescription signed by me  medication  Amount Refills   6/25/2019  Adderall 30mg BID 60 2   6/25/2019  Hydrocodone 10/325 q 6hrs 90 0     call if any problems    Health Maintenance reviewed - reviewed, UTD. Recommended healthy diet low in carbohydrates, fats, sodium and cholesterol. Recommended regular cardiovascular exercise 3-6 times per week for 30-60 minutes daily. Chart is reviewed and updated today in the office. Records requested for other providers patient has seen and is currently seeing. Patient was offered a choice/choices in the treatment plan today. Patient expresses understanding of the plan and agrees with recommendations. Verbal and written instructions (see AVS) provided. See patient instructions for more. Patient expresses understanding of diagnosis and treatment plan.

## 2019-06-25 NOTE — PROGRESS NOTES
Jaysontha Pencil  Identified pt with two pt identifiers(name and ). Chief Complaint   Patient presents with    Medication Refill     Room 5       1. Have you been to the ER, urgent care clinic since your last visit? n  Hospitalized since your last visit? n     2. Have you seen or consulted any other health care providers outside of the [address] [place] [address] since your last visit? Include any pap smears or colon screening. n       Advance Care Planning    In the event something were to happen to you and you were unable to speak on your behalf, do you have an Advance Directive/ Living Will in place stating your wishes? NO    If yes, do we have a copy on file NO    If no, would you like information NO    Medication reconciliation up to date and corrected with patient at this time. Today's provider has been notified of reason for visit, vitals and flowsheets obtained on patients. Reviewed record in preparation for visit, huddled with provider and have obtained necessary documentation.       Health Maintenance Due   Topic    Pneumococcal 0-64 years (1 of 1 - PPSV23)    DTaP/Tdap/Td series (1 - Tdap)       Wt Readings from Last 3 Encounters:   19 258 lb 14.4 oz (117.4 kg)   19 257 lb 11.2 oz (116.9 kg)   19 258 lb 4.8 oz (117.2 kg)     Temp Readings from Last 3 Encounters:   19 96.4 °F (35.8 °C) (Oral)   19 98.1 °F (36.7 °C) (Oral)   19 98.1 °F (36.7 °C) (Oral)     BP Readings from Last 3 Encounters:   19 134/84   19 138/78   19 140/80     Pulse Readings from Last 3 Encounters:   19 85   19 69   19 91     Vitals:    19 1349   BP: 134/84   Pulse: 85   Resp: 20   Temp: 96.4 °F (35.8 °C)   TempSrc: Oral   SpO2: 97%   Weight: 258 lb 14.4 oz (117.4 kg)   Height: 6' 3\" (1.905 m)   PainSc:   0 - No pain         Learning Assessment:  :     Learning Assessment 2015   PRIMARY LEARNER Patient Patient   HIGHEST LEVEL OF EDUCATION - PRIMARY LEARNER  GRADUATED HIGH SCHOOL OR GED -   BARRIERS PRIMARY LEARNER NONE -   CO-LEARNER CAREGIVER No -   PRIMARY LANGUAGE ENGLISH ENGLISH   LEARNER PREFERENCE PRIMARY READING OTHER (COMMENT)   ANSWERED BY patient pt   RELATIONSHIP SELF SELF       Depression Screening:  :     3 most recent PHQ Screens 2/5/2019   Little interest or pleasure in doing things Not at all   Feeling down, depressed, irritable, or hopeless Not at all   Total Score PHQ 2 0   Trouble falling or staying asleep, or sleeping too much -   Feeling tired or having little energy -   Poor appetite, weight loss, or overeating -   Feeling bad about yourself - or that you are a failure or have let yourself or your family down -   Trouble concentrating on things such as school, work, reading, or watching TV -   Moving or speaking so slowly that other people could have noticed; or the opposite being so fidgety that others notice -   Thoughts of being better off dead, or hurting yourself in some way -   PHQ 9 Score -   How difficult have these problems made it for you to do your work, take care of your home and get along with others -       No flowsheet data found. Fall Risk Assessment:  :     No flowsheet data found. Abuse Screening:  :     Abuse Screening Questionnaire 8/24/2018   Do you ever feel afraid of your partner? N   Are you in a relationship with someone who physically or mentally threatens you? N   Is it safe for you to go home?  Y       ADL Screening:  :     ADL Assessment 2/5/2019   Feeding yourself No Help Needed   Getting from bed to chair No Help Needed   Getting dressed No Help Needed   Bathing or showering No Help Needed   Walk across the room (includes cane/walker) No Help Needed   Using the telphone No Help Needed   Taking your medications No Help Needed   Preparing meals No Help Needed   Managing money (expenses/bills) No Help Needed   Moderately strenuous housework (laundry) No Help Needed   Shopping for personal items (toiletries/medicines) No Help Needed   Shopping for groceries No Help Needed   Driving No Help Needed   Climbing a flight of stairs No Help Needed   Getting to places beyond walking distances No Help Needed           BMI:  Weight Metrics 6/25/2019 4/30/2019 2/5/2019 10/22/2018 8/24/2018 8/10/2018 8/1/2018   Weight 258 lb 14.4 oz 257 lb 11.2 oz 258 lb 4.8 oz 251 lb 6.4 oz 250 lb 12.8 oz 248 lb 9.6 oz 252 lb 12.8 oz   BMI 32.36 kg/m2 32.21 kg/m2 32.29 kg/m2 30.2 kg/m2 30.13 kg/m2 29.87 kg/m2 30.37 kg/m2       Patient presents for Controlled Substance Prescription follow up. amphetamine-dextroamphetamine XR (ADDERALL XR) 30 mg XR capsule [[id number]]     Order Details   Dose: 30 mg Route: Oral Frequency: DAILY   Dispense Quantity: 30 Cap Refills: 0 Fills remaining: --           Sig: Take 1 Cap by mouth daily for 29 days. Max Daily Amount: 30 mg.          Written Date: 04/30/19 Expiration Date: --     Start Date: 05/30/19 End Date: 06/28/19 after 29 doses     Earliest Fill Date: 05/30/19             Ordering Provider:  -- ONUR #:  [id number] NPI:  --    Authorizing Provider:  Wes Ruff NP ONUR #:  [id number] NPI:  [id number]    Ordering User:  Wes Ruff NP            Diagnosis Association: ADD (attention deficit disorder) without hyperactivity (F98.8)        -amphetamine (ADDERALL) 30 mg tablet [[id number]]     Order Details   Dose: 30 mg Route: Oral Frequency: DAILY   Dispense Quantity: 30 Tab Refills: 0 Fills remaining: --           Sig: Take 1 Tab by mouth daily for 29 days.  Max Daily Amount: 1 Tab.          Written Date: 04/30/19 Expiration Date: --     Start Date: 05/30/19 End Date: 06/28/19 after 29 doses     Earliest Fill Date: 05/30/19             Ordering Provider:  -- ONUR #:  [id number] NPI:  --    Authorizing Provider:  Wes Ruff NP ONUR #:  [id number] NPI:  [id number]    Ordering User:  Wes Ruff NP            Diagnosis Association: ADD (attention deficit disorder) without hyperactivity (F98.8)            Bottle matches last prescription. 3 pills remaining in bottle. Patient reports last dose taken at 06/24/2019 @ 0800   printed.y  Urine collected. Controlled Substance Agreement letter printed. Medication reconciliation up to date and corrected with patient at this time.

## 2019-06-25 NOTE — PATIENT INSTRUCTIONS
· Go to [email or web address]  · Enter your zip code. · Click on your insurance carrier (usually on left side of screen). · Then click any other parameters you desire. This will result in a list of providers. Click on any provider to learn more about them or see the contact information. Please choose a provider, call them, and schedule an appointment. Chronic Pain: Care Instructions  Your Care Instructions    Chronic pain is pain that lasts a long time (months or even years) and may or may not have a clear cause. It is different from acute pain, which usually does have a clear cause--like an injury or illness--and gets better over time. Chronic pain:  · Lasts over time but may vary from day to day. · Does not go away despite efforts to end it. · May disrupt your sleep and lead to fatigue. · May cause depression or anxiety. · May make your muscles tense, causing more pain. · Can disrupt your work, hobbies, home life, and relationships with friends and family. Chronic pain is a very real condition. It is not just in your head. Treatment can help and usually includes several methods used together, such as medicines, physical therapy, exercise, and other treatments. Learning how to relax and changing negative thought patterns can also help you cope. Chronic pain is complex. Taking an active role in your treatment will help you better manage your pain. Tell your doctor if you have trouble dealing with your pain. You may have to try several things before you find what works best for you. Follow-up care is a key part of your treatment and safety. Be sure to make and go to all appointments, and call your doctor if you are having problems. It's also a good idea to know your test results and keep a list of the medicines you take. How can you care for yourself at home? · Pace yourself. Break up large jobs into smaller tasks.  Save harder tasks for days when you have less pain, or go back and forth between hard tasks and easier ones. Take rest breaks. · Relax, and reduce stress. Relaxation techniques such as deep breathing or meditation can help. · Keep moving. Gentle, daily exercise can help reduce pain over the long run. Try low- or no-impact exercises such as walking, swimming, and stationary biking. Do stretches to stay flexible. · Try heat, cold packs, and massage. · Get enough sleep. Chronic pain can make you tired and drain your energy. Talk with your doctor if you have trouble sleeping because of pain. · Think positive. Your thoughts can affect your pain level. Do things that you enjoy to distract yourself when you have pain instead of focusing on the pain. See a movie, read a book, listen to music, or spend time with a friend. · If you think you are depressed, talk to your doctor about treatment. · Keep a daily pain diary. Record how your moods, thoughts, sleep patterns, activities, and medicine affect your pain. You may find that your pain is worse during or after certain activities or when you are feeling a certain emotion. Having a record of your pain can help you and your doctor find the best ways to treat your pain. · Take pain medicines exactly as directed. ? If the doctor gave you a prescription medicine for pain, take it as prescribed. ? If you are not taking a prescription pain medicine, ask your doctor if you can take an over-the-counter medicine. Reducing constipation caused by pain medicine  · Include fruits, vegetables, beans, and whole grains in your diet each day. These foods are high in fiber. · Drink plenty of fluids, enough so that your urine is light yellow or clear like water. If you have kidney, heart, or liver disease and have to limit fluids, talk with your doctor before you increase the amount of fluids you drink. · If your doctor recommends it, get more exercise. Walking is a good choice. Bit by bit, increase the amount you walk every day.  Try for at least 30 minutes on most days of the week. · Schedule time each day for a bowel movement. A daily routine may help. Take your time and do not strain when having a bowel movement. When should you call for help? Call your doctor now or seek immediate medical care if:    · Your pain gets worse or is out of control.     · You feel down or blue, or you do not enjoy things like you once did. You may be depressed, which is common in people with chronic pain. Depression can be treated.     · You have vomiting or cramps for more than 2 hours.    Watch closely for changes in your health, and be sure to contact your doctor if:    · You cannot sleep because of pain.     · You are very worried or anxious about your pain.     · You have trouble taking your pain medicine.     · You have any concerns about your pain medicine.     · You have trouble with bowel movements, such as:  ? No bowel movement in 3 days. ? Blood in the anal area, in your stool, or on the toilet paper. ? Diarrhea for more than 24 hours. Where can you learn more? Go to http://charissa-maria elena.info/. Enter N004 in the search box to learn more about \"Chronic Pain: Care Instructions. \"  Current as of: Yuliya 3, 2018  Content Version: 11.9  © 0244-4894 [employer]. Care instructions adapted under license by [employer] (which disclaims liability or warranty for this information). If you have questions about a medical condition or this instruction, always ask your healthcare professional. [place] [address] any warranty or liability for your use of this information. Erectile Dysfunction: Care Instructions  Your Care Instructions    A man has erectile dysfunction (ED) when he routinely can't get or keep an erection that allows satisfactory sex. He may not be able to have an erection at any time. Or he may not be able to have one that is firm enough or lasts long enough to complete intercourse.   ED is not the same as having trouble getting an erection now and then. That's common. It happens to most men at some time. ED can be caused by problems with the blood vessels, nerves, or hormones. It can be caused by diabetes, heart disease, and injuries. Nerve disorders, such as multiple sclerosis or Parkinson's disease, can also cause it. ED can also be caused by medicines, alcohol, and tobacco. Or it may be caused by depression, stress, grief, or relationship problems. Follow-up care is a key part of your treatment and safety. Be sure to make and go to all appointments, and call your doctor if you are having problems. It's also a good idea to know your test results and keep a list of the medicines you take. How can you care for yourself at home?   Lifestyle    · Limit alcohol. Have no more than 2 drinks a day.     · Do not smoke. Smoking makes it harder for the blood vessels in the penis to relax and let blood flow in. If you need help quitting, talk to your doctor about stop-smoking programs and medicines. These can increase your chances of quitting for good.     · Do not use cocaine, heroin, or other illegal drugs.     · Try to reduce stress.     · Give yourself time to adjust to change. Changes in your job, family, relationships, home life, and other areas can cause stress. And stress can cause erection problems.    Work with your partner    · Don't assume that you know what your partner likes when it comes to sex. You may be wrong. Talk about what each of you does and does not enjoy.     · Make time outside of the bedroom to talk about your sex life. If you avoid sex because you are afraid of having erection problems, your partner may worry that you are no longer interested.     · If you and your partner have trouble talking about sex, see a therapist who can help you talk about it. Reading books with your partner about sexual health may also help.     · Relax. Take time for more foreplay.  Worrying about your erections may only make things worse. Medicines    · Tell your doctor about all the medicines that you take. ? Some medicines can cause erection problems. ? Some medicines can have dangerous interactions with medicines that are prescribed for ED, including over-the-counter medicines and herbal products.     · Be safe with medicines. Take your medicines exactly as prescribed. Call your doctor if you think you are having a problem with your medicine.     · Talk to your doctor about trying a medicine to help you keep an erection. This could be a medicine such as Viagra, Levitra, or Cialis. If you have a heart problem, ask your doctor if these are safe for you. Do not take these medicines if you take nitroglycerin or other nitrate medicine. When should you call for help? Call your doctor now or seek immediate medical care if:    · You took a medicine for erectile dysfunction and you have an erection that lasts longer than 3 hours.    Watch closely for changes in your health, and be sure to contact your doctor if you have any problems. Where can you learn more? Go to http://charissa-maria elena.info/. Enter [id number] in the search box to learn more about \"Erectile Dysfunction: Care Instructions. \"  Current as of: September 26, 2018  Content Version: 11.9  © 3144-6932 [employer]. Care instructions adapted under license by [employer] (which disclaims liability or warranty for this information). If you have questions about a medical condition or this instruction, always ask your healthcare professional. [place] [address] any warranty or liability for your use of this information.

## 2019-07-26 ENCOUNTER — OFFICE VISIT (OUTPATIENT)
Dept: FAMILY MEDICINE CLINIC | Age: 44
End: 2019-07-26

## 2019-07-26 VITALS
SYSTOLIC BLOOD PRESSURE: 123 MMHG | OXYGEN SATURATION: 100 % | WEIGHT: 245.6 LBS | HEIGHT: 75 IN | DIASTOLIC BLOOD PRESSURE: 86 MMHG | HEART RATE: 86 BPM | BODY MASS INDEX: 30.54 KG/M2 | RESPIRATION RATE: 20 BRPM | TEMPERATURE: 97 F

## 2019-07-26 DIAGNOSIS — M25.562 ACUTE PAIN OF LEFT KNEE: Primary | ICD-10-CM

## 2019-07-26 DIAGNOSIS — G89.29 OTHER CHRONIC PAIN: ICD-10-CM

## 2019-07-26 DIAGNOSIS — F98.8 ADD (ATTENTION DEFICIT DISORDER) WITHOUT HYPERACTIVITY: ICD-10-CM

## 2019-07-26 RX ORDER — DEXTROAMPHETAMINE SACCHARATE, AMPHETAMINE ASPARTATE, DEXTROAMPHETAMINE SULFATE AND AMPHETAMINE SULFATE 5; 5; 5; 5 MG/1; MG/1; MG/1; MG/1
20 TABLET ORAL 2 TIMES DAILY
Qty: 60 TAB | Refills: 0 | Status: SHIPPED | OUTPATIENT
Start: 2019-07-26 | End: 2019-08-25

## 2019-07-26 RX ORDER — HYDROCODONE BITARTRATE AND ACETAMINOPHEN 10; 325 MG/1; MG/1
1 TABLET ORAL
Qty: 90 TAB | Refills: 0 | Status: SHIPPED | OUTPATIENT
Start: 2019-07-26 | End: 2019-08-20 | Stop reason: ALTCHOICE

## 2019-07-26 RX ORDER — DICLOFENAC SODIUM 75 MG/1
75 TABLET, DELAYED RELEASE ORAL
COMMUNITY
Start: 2019-07-22 | End: 2019-07-26 | Stop reason: ALTCHOICE

## 2019-07-26 RX ORDER — IBUPROFEN 200 MG
200 TABLET ORAL
COMMUNITY
End: 2019-07-26 | Stop reason: ALTCHOICE

## 2019-07-26 RX ORDER — DEXTROAMPHETAMINE SACCHARATE, AMPHETAMINE ASPARTATE, DEXTROAMPHETAMINE SULFATE AND AMPHETAMINE SULFATE 7.5; 7.5; 7.5; 7.5 MG/1; MG/1; MG/1; MG/1
30 TABLET ORAL 2 TIMES DAILY
Qty: 60 TAB | Refills: 0 | Status: SHIPPED | OUTPATIENT
Start: 2019-08-24 | End: 2019-07-26 | Stop reason: DRUGHIGH

## 2019-07-26 RX ORDER — NAPROXEN 500 MG/1
500 TABLET ORAL 2 TIMES DAILY WITH MEALS
Qty: 60 TAB | Refills: 1 | Status: SHIPPED | OUTPATIENT
Start: 2019-07-26 | End: 2019-09-04 | Stop reason: SDUPTHER

## 2019-07-26 NOTE — PATIENT INSTRUCTIONS
Knee Pain or Injury: Care Instructions  Your Care Instructions    Injuries are a common cause of knee problems. Sudden (acute) injuries may be caused by a direct blow to the knee. They can also be caused by abnormal twisting, bending, or falling on the knee. Pain, bruising, or swelling may be severe, and may start within minutes of the injury. Overuse is another cause of knee pain. Other causes are climbing stairs, kneeling, and other activities that use the knee. Everyday wear and tear, especially as you get older, also can cause knee pain. Rest, along with home treatment, often relieves pain and allows your knee to heal. If you have a serious knee injury, you may need tests and treatment. Follow-up care is a key part of your treatment and safety. Be sure to make and go to all appointments, and call your doctor if you are having problems. It's also a good idea to know your test results and keep a list of the medicines you take. How can you care for yourself at home? · Be safe with medicines. Read and follow all instructions on the label. ? If the doctor gave you a prescription medicine for pain, take it as prescribed. ? If you are not taking a prescription pain medicine, ask your doctor if you can take an over-the-counter medicine. · Rest and protect your knee. Take a break from any activity that may cause pain. · Put ice or a cold pack on your knee for 10 to 20 minutes at a time. Put a thin cloth between the ice and your skin. · Prop up a sore knee on a pillow when you ice it or anytime you sit or lie down for the next 3 days. Try to keep it above the level of your heart. This will help reduce swelling. · If your knee is not swollen, you can put moist heat, a heating pad, or a warm cloth on your knee. · If your doctor recommends an elastic bandage, sleeve, or other type of support for your knee, wear it as directed.   · Follow your doctor's instructions about how much weight you can put on your leg. Use a cane, crutches, or a walker as instructed. · Follow your doctor's instructions about activity during your healing process. If you can do mild exercise, slowly increase your activity. · Reach and stay at a healthy weight. Extra weight can strain the joints, especially the knees and hips, and make the pain worse. Losing even a few pounds may help. When should you call for help? Call 911 anytime you think you may need emergency care. For example, call if:    · You have symptoms of a blood clot in your lung (called a pulmonary embolism). These may include:  ? Sudden chest pain. ? Trouble breathing. ? Coughing up blood.    Call your doctor now or seek immediate medical care if:    · You have severe or increasing pain.     · Your leg or foot turns cold or changes color.     · You cannot stand or put weight on your knee.     · Your knee looks twisted or bent out of shape.     · You cannot move your knee.     · You have signs of infection, such as:  ? Increased pain, swelling, warmth, or redness. ? Red streaks leading from the knee. ? Pus draining from a place on your knee. ? A fever.     · You have signs of a blood clot in your leg (called a deep vein thrombosis), such as:  ? Pain in your calf, back of the knee, thigh, or groin. ? Redness and swelling in your leg or groin.    Watch closely for changes in your health, and be sure to contact your doctor if:    · You have tingling, weakness, or numbness in your knee.     · You have any new symptoms, such as swelling.     · You have bruises from a knee injury that last longer than 2 weeks.     · You do not get better as expected. Where can you learn more? Go to http://[email or web address]-maria elena.info/. Enter K195 in the search box to learn more about \"Knee Pain or Injury: Care Instructions. \"  Current as of: September 23, 2018  Content Version: 12.1  © 2354-0526 [employer].  Care instructions adapted under license by [facility] (which disclaims liability or warranty for this information). If you have questions about a medical condition or this instruction, always ask your healthcare professional. [address] any warranty or liability for your use of this information. Learning About Prescribed Opioid Medicine for Chronic Pain  Introduction    Opioids are medicines used to relieve moderate to severe pain. Examples include fentanyl, hydrocodone, morphine, and oxycodone. Heroin is an example of an illegal opioid. They may be used for pain that may go on for a long time, such as for cancer or arthritis. Opioids relieve pain by changing the way your body feels pain and the way you feel about pain. They don't cure a health problem. But they do help you manage the pain. Your doctor will talk with you about how they fit into your overall treatment plan. Doctors follow a strict set of rules for giving opioids to their patients who have long-term (chronic) pain. This is because these are powerful medicines. They can cause problems if they are not used correctly. They can also be misused. Getting a prescription for an opioid may seem hard to do. You may feel like your doctor doesn't trust you. Your doctor understands this. But the rules are the same for everyone. They help make sure that the medicine will be used safely. What happens before you get a prescription? Your doctor may talk with you about your pain history. He or she may ask about your family history. You may have a physical exam. You may also see a pain specialist. This helps your doctor decide if an opioid is right for you. Your doctor will also review your history of opioid use. Most states have a program that tracks prescriptions. If your state does, your doctor will check it to see if you've had prescriptions for opioids before. You may have a urine test to check for opioids in your system.  If you're a woman, you may have a pregnancy test. Opioids are not safe to take if you are pregnant. When your doctor prescribes the medicine, he or she will discuss your treatment plan with you. That includes the risks and benefits of opioid medicines. Your doctor will tell you how much pain relief and improved ability to function you can expect. The goal is to take the lowest dose that improves your pain for the shortest amount of time. Your doctor will also talk with you about other things you can do to help relieve pain. Your doctor may suggest different medicines or other options, such as steroid injections, ice or heat, physical therapy, or ways to reduce stress. It's always a good idea to ask questions before you get a new prescription. This is especially true when you get a prescription for an opioid. Some questions to ask your doctor include:  · Why do I need this medicine? Is it right for me? · How long should I take this medicine? · What can I do to help with side effects, like constipation? · How should I store this medicine? What should I do with leftover or unused opioid medicine? · Do I need a prescription for naloxone? You and your doctor will talk about your responsibilities. You may sign a written agreement. You will agree to take your medicine exactly as your doctor tells you to. Rashel Recinos also agree to be careful with it and not to share it with others. What happens after you start to use your medicine? Regular follow-up visits to your doctor will help you and your doctor make sure that the medicine is the right treatment for your pain. At every visit, your doctor will check these things:  · Is your pain being controlled? · Are you better able to function and be active? · Are you having any side effects, like constipation, nausea, or being too sleepy? · Are the non-opioid pain control measures working? Are there other things you can try? · Are there any signs that you are misusing your medicine?   While you are taking an opioid, you may have drug tests and prescription history checks from time to time. Follow-up care is a key part of your treatment and safety. Be sure to make and go to all appointments, and call your doctor if you are having problems. It's also a good idea to know your test results and keep a list of the medicines you take. Where can you learn more? Go to http://[email or web address]-maria elena.info/. Enter D374 in the search box to learn more about \"Learning About Prescribed Opioid Medicine for Chronic Pain. \"  Current as of: March 28, 2019  Content Version: 12.1  © 7400-7811 [employer]. Care instructions adapted under license by [employer] (which disclaims liability or warranty for this information). If you have questions about a medical condition or this instruction, always ask your healthcare professional. [address] any warranty or liability for your use of this information.

## 2019-07-26 NOTE — PROGRESS NOTES
Chief Complaint   Patient presents with    Diarrhea     Pt says it has subsided/ Room 5    Vomiting     HPI:  The patient is a 44 y.o. male who presents today for a follow up appointment. No hospital, ER or specialist visits since last primary care visit except as noted below. ADD:  Pt restarted on Adderall at his last visit. He reports his ADD symptoms were well controlled but now he feels it makes him jittery. He would like to try a different ADD medication, he was changed to Vyvanse on 8/1/2018. He does not feel it works as well as Adderall. Now he is taking Adderall which he feels is wearing off. He has returned to work and his working very long shifts. He would like to discuss another option. He has continues to struggle with being symptomatic with ADD as well as depression as well as returning to work. These variables have made effective treatment options challenging thus far. He has been taking Adderall 20mg XR w/ Adderall 20mg in PM.  He had been on Adderall 30mg XR but was changed incidentally while I was out of the office. He has noticed a difference and would like to switch back to 30mg XR. He was switched back to 30mg XR. He feels the immediate release 30mg helps his symptoms at work more so than the XR. Denies adverse effects. Appetite is good. Weight is stable. Denies insomnia. Depression:  He has been taking Wellbutrin BID with great improvement. He does report fatigue, states he previous PCP gave him a Vit B12 shot with good effectiveness. He reports considerably worsening depression this week, he reports 2-3 episodes of SI, he denies a plan and felt \"it would be ok if he did not wake up tomorrow\". At his last visit on 6/29/2018, he was started on Lamictal 25mg daily. He has now increased to 50mg daily and  reports his depressive symptoms are continuing to improve. He ran out of his Lamictal for approximately 1 week.   He felt the Wellbutrin and Lamictal were managing his symptoms well. He had diarrhea and felt the Lamictal was the cause of this, therefore he stopped the Lamictal.  He reports his diarrhea resolved. He is now taking Wellbutrin BID with good effectiveness. He denies any adverse effects with both of these medications. 3 most recent PHQ Screens 2/5/2019   Little interest or pleasure in doing things Not at all   Feeling down, depressed, irritable, or hopeless Not at all   Total Score PHQ 2 0   Trouble falling or staying asleep, or sleeping too much -   Feeling tired or having little energy -   Poor appetite, weight loss, or overeating -   Feeling bad about yourself - or that you are a failure or have let yourself or your family down -   Trouble concentrating on things such as school, work, reading, or watching TV -   Moving or speaking so slowly that other people could have noticed; or the opposite being so fidgety that others notice -   Thoughts of being better off dead, or hurting yourself in some way -   PHQ 9 Score -   How difficult have these problems made it for you to do your work, take care of your home and get along with others -     Chronic Pain:  He continues to have joint and back pain secondary to working as [employer] as a  and . He is taking Hydrocodone in the evenings and PRN. He is out of this and would like a refill today. He also has chronic pain s/p cancer treatment. He recently had a left knee injury that exacerbated his pain. He was seen at [facility] at that time. He had an x-ray at that time that showed no dislocation or fracture per the patient. He reports that he is still in pain. He states it is bruised. HTN:  He stopped taking Lisinopril and was normotensive at his last visit on 6/29/2018. However he is still not taking his medication and is normotensive. He denies CP/SOB/palpitations, HECTOR, and HA.     BP Readings from Last 3 Encounters:   07/26/19 123/86   06/25/19 134/84   04/30/19 138/78     Review of Systems  A comprehensive review of systems was negative except for that written in the HPI. Patient Active Problem List   Diagnosis Code    Adjustment reaction with anxiety and depression F43.23    Vitamin D deficiency E55.9    Hypercholesterolemia E78.00    GERD (gastroesophageal reflux disease) K21.9    ADD (attention deficit disorder) without hyperactivity F98.8    Moderate major depression ([facility] F32.1    Vitamin B12 deficiency E53.8    Hypothyroidism E03.9    Thyroid disease E07.9    Other chronic pain G89.29    Erectile dysfunction following radiation therapy N52.35       Past Medical History:   Diagnosis Date    ADD (attention deficit disorder) without hyperactivity 2018    Adjustment reaction with anxiety and depression     Attention deficit hyperactivity disorder (ADHD), predominantly inattentive type 2018    Cancer ([facility]     testicular     [facility] 2010    Testicular    Community acquired pneumonia     Depression     GERD (gastroesophageal reflux disease)     Headache     Hypercholesterolemia     Hypertension     Thyroid disease 2017    Vitamin D deficiency        Past Surgical History:   Procedure Laterality Date    HX ORCHIECTOMY Right 2010    HX UROLOGICAL      removed on e testicle    HX WISDOM TEETH EXTRACTION         Social History     Tobacco Use    Smoking status: Former Smoker     Packs/day: 1.00     Years: 8.00     Pack years: 8.00     Types: Cigarettes     Last attempt to quit:      Years since quittin.5    Smokeless tobacco: Never Used   Substance Use Topics    Alcohol use:  Yes     Alcohol/week: 10.0 standard drinks     Types: 24 Cans of beer per week     Frequency: Monthly or less     Drinks per session: 1 or 2     Binge frequency: Less than monthly    Drug use: No       Family History   Problem Relation Age of Onset    Cancer Mother         Ovarian    Hypertension Mother     Cancer Father         Throat    Hypertension Father     Arrhythmia Brother         Afib    No Known Problems Maternal Grandmother     No Known Problems Maternal Grandfather     No Known Problems Paternal Grandmother     No Known Problems Paternal Grandfather        Outpatient Medications Marked as Taking for the 7/26/19 encounter (Office Visit) with Domenic Barraza NP   Medication Sig Dispense Refill    [START ON 8/24/2019] dextroamphetamine-amphetamine (ADDERALL) 30 mg tablet Take 1 Tab by mouth two (2) times a day for 30 days. Max Daily Amount: 2 Tabs. 60 Tab 0    HYDROcodone-acetaminophen (NORCO)  mg tablet Take 1 Tab by mouth every six (6) hours as needed for Pain for up to 30 days. Max Daily Amount: 4 Tabs. 90 Tab 0    naproxen (NAPROSYN) 500 mg tablet Take 1 Tab by mouth two (2) times daily (with meals). 60 Tab 1    tadalafil (CIALIS) 5 mg tablet Take 1 Tab by mouth daily as needed for Other (ED). 30 Tab 2    levothyroxine (SYNTHROID) 50 mcg tablet Take 1 Tab by mouth Daily (before breakfast). 90 Tab 1    atorvastatin (LIPITOR) 20 mg tablet Take 1 Tab by mouth daily. 90 Tab 1    buPROPion XL (WELLBUTRIN XL) 300 mg XL tablet Take 1 Tab by mouth every morning. 90 Tab 1    albuterol (PROVENTIL HFA, VENTOLIN HFA, PROAIR HFA) 90 mcg/actuation inhaler Take 2 Puffs by inhalation every four (4) hours as needed for Wheezing. 1 Inhaler 2    multivitamin (ONE A DAY) tablet Take 1 Tab by mouth daily. Current Outpatient Medications on File Prior to Visit   Medication Sig Dispense Refill    tadalafil (CIALIS) 5 mg tablet Take 1 Tab by mouth daily as needed for Other (ED). 30 Tab 2    levothyroxine (SYNTHROID) 50 mcg tablet Take 1 Tab by mouth Daily (before breakfast). 90 Tab 1    atorvastatin (LIPITOR) 20 mg tablet Take 1 Tab by mouth daily. 90 Tab 1    buPROPion XL (WELLBUTRIN XL) 300 mg XL tablet Take 1 Tab by mouth every morning.  90 Tab 1    albuterol (PROVENTIL HFA, VENTOLIN HFA, PROAIR HFA) 90 mcg/actuation inhaler Take 2 Puffs by inhalation every four (4) hours as needed for Wheezing. 1 Inhaler 2    multivitamin (ONE A DAY) tablet Take 1 Tab by mouth daily. No current facility-administered medications on file prior to visit. No Known Allergies    PE:  Visit Vitals  /86 (BP 1 Location: Right arm, BP Patient Position: Sitting)   Pulse 86   Temp 97 °F (36.1 °C) (Oral)   Resp 20   Ht 6' 3\" (1.905 m)   Wt 245 lb 9.6 oz (111.4 kg)   SpO2 100%   BMI 30.70 kg/m²       Gen: alert, oriented, no acute distress  Head: normocephalic, atraumatic  Ears: external auditory canals clear, TMs without erythema or effusion  Eyes: pupils equal round reactive to light, sclera clear, conjunctiva clear  Oral: moist mucus membranes, no oral lesions, no pharyngeal inflammation or exudate  Neck: symmetric normal sized thyroid, no carotid bruits, no jugular vein distention  Resp: no increase work of breathing, lungs clear to ausculation bilaterally, no wheezing, rales or rhonchi  CV: S1, S2 normal.  No murmurs, rubs, or gallops. Abd: soft, not tender, not distended. No hepatosplenomegaly. Normal bowel sounds. No hernias. No abdominal or renal bruits. Neuro: cranial nerves intact, normal strength and movement in all extremities, reflexes and sensation intact and symmetric. Skin: no lesion or rash  Extremities: no cyanosis or edema    No results found for this visit on 07/26/19. Assessment/Plan:    ICD-10-CM ICD-9-CM    1. Acute pain of left knee M25.562 719.46 REFERRAL TO ORTHOPEDICS      naproxen (NAPROSYN) 500 mg tablet   2. ADD (attention deficit disorder) without hyperactivity F98.8 314.00 dextroamphetamine-amphetamine (ADDERALL) 20 mg tablet      DISCONTINUED: dextroamphetamine-amphetamine (ADDERALL) 30 mg tablet   3. Other chronic pain G89.29 338.29 HYDROcodone-acetaminophen (NORCO)  mg tablet      naproxen (NAPROSYN) 500 mg tablet     Diagnoses and all orders for this visit:    1.  Acute pain of left knee  - REFERRAL TO ORTHOPEDICS  -     naproxen (NAPROSYN) 500 mg tablet; Take 1 Tab by mouth two (2) times daily (with meals). 2. ADD (attention deficit disorder) without hyperactivity  -     dextroamphetamine-amphetamine (ADDERALL) 20 mg tablet; Take 1 Tab by mouth two (2) times a day for 30 days. Max Daily Amount: 40 mg.    3. Other chronic pain  -     HYDROcodone-acetaminophen (NORCO)  mg tablet; Take 1 Tab by mouth every six (6) hours as needed for Pain for up to 30 days. Max Daily Amount: 4 Tabs. -     naproxen (NAPROSYN) 500 mg tablet; Take 1 Tab by mouth two (2) times daily (with meals). Follow-up and Dispositions    · Return in about 2 months (around 9/26/2019) for Medication Check, ADHD/ADD, Chronic Pain.       lab results and schedule of future lab studies reviewed with patient  reviewed diet, exercise and weight control  reviewed medications and side effects in detail    call if any problems    Controlled substance plan and safety assessment:  Medication: Adderall/Norco  MME/day: 40   >= 50? no   >= 120? Naloxone Rx? no   appropriate and last checked on: 7/26/2019  Last Urine Toxicology: 6/2019, appropriate  Treatment agreement was signed by pt and myself on: 6/25/2018, need new one signed at next visit    24 hour opioid dose >120mg morphine equivalent/day:  [] Yes   [x] No  Benzodiazepines:  [] Yes   [x] No  Sleep apnea:  [] Yes   [x] No  Urine Toxicology Testing within last 6 months:  [x] Yes   [] No  History of or new aberrant medication taking behaviors:  [] Yes   [x] No    Controlled Substance Refills given  Date prescription signed by me  medication  Amount Refills   7/31/2019  Norco 90 0   7/31/2019  Adderall BID 60 2             Health Maintenance reviewed - reviewed, UTD. Recommended healthy diet low in carbohydrates, fats, sodium and cholesterol. Recommended regular cardiovascular exercise 3-6 times per week for 30-60 minutes daily.       Chart is reviewed and updated today in the office. Records requested for other providers patient has seen and is currently seeing. Patient was offered a choice/choices in the treatment plan today. Patient expresses understanding of the plan and agrees with recommendations. Verbal and written instructions (see AVS) provided. See patient instructions for more. Patient expresses understanding of diagnosis and treatment plan.

## 2019-07-26 NOTE — LETTER
NOTIFICATION RETURN TO WORK 
 
7/26/2019 10:46 AM 
 
Mr. Shona Alas 
[facility] [address] [id number] To Whom It May Concern: 
 
Shona Alas is currently under the care of [facility]. He will return to work on: July 29, 2019 If there are questions or concerns please have the patient contact our office. Sincerely, Lin Geiger NP

## 2019-07-26 NOTE — PROGRESS NOTES
Anaid Alvaro  Identified pt with two pt identifiers(name and ). Chief Complaint   Patient presents with    Diarrhea     Pt says it has subsided/ Room 5    Vomiting       1. Have you been to the ER, urgent care clinic since your last visit?n   Hospitalized since your last visit? n     2. Have you seen or consulted any other health care providers outside of the [address] [place] [address] since your last visit? Include any pap smears or colon screening. n       Advance Care Planning    In the event something were to happen to you and you were unable to speak on your behalf, do you have an Advance Directive/ Living Will in place stating your wishes? NO    If yes, do we have a copy on file NO    If no, would you like information NO    Medication reconciliation up to date and corrected with patient at this time. Today's provider has been notified of reason for visit, vitals and flowsheets obtained on patients. Reviewed record in preparation for visit, huddled with provider and have obtained necessary documentation. There are no preventive care reminders to display for this patient.     Wt Readings from Last 3 Encounters:   19 245 lb 9.6 oz (111.4 kg)   19 258 lb 14.4 oz (117.4 kg)   19 257 lb 11.2 oz (116.9 kg)     Temp Readings from Last 3 Encounters:   19 97 °F (36.1 °C) (Oral)   19 96.4 °F (35.8 °C) (Oral)   19 98.1 °F (36.7 °C) (Oral)     BP Readings from Last 3 Encounters:   19 123/86   19 134/84   19 138/78     Pulse Readings from Last 3 Encounters:   19 86   19 85   19 69     Vitals:    19 0910   BP: 123/86   Pulse: 86   Resp: 20   Temp: 97 °F (36.1 °C)   TempSrc: Oral   SpO2: 100%   Weight: 245 lb 9.6 oz (111.4 kg)   Height: 6' 3\" (1.905 m)   PainSc:   2   PainLoc: Abdomen         Learning Assessment:  :     Learning Assessment 2015   PRIMARY LEARNER Patient Patient   HIGHEST LEVEL OF EDUCATION - PRIMARY LEARNER  GRADUATED HIGH SCHOOL OR GED -   BARRIERS PRIMARY LEARNER NONE -   CO-LEARNER CAREGIVER No -   PRIMARY LANGUAGE ENGLISH ENGLISH   LEARNER PREFERENCE PRIMARY READING OTHER (COMMENT)   ANSWERED BY patient pt   RELATIONSHIP SELF SELF       Depression Screening:  :     3 most recent PHQ Screens 2/5/2019   Little interest or pleasure in doing things Not at all   Feeling down, depressed, irritable, or hopeless Not at all   Total Score PHQ 2 0   Trouble falling or staying asleep, or sleeping too much -   Feeling tired or having little energy -   Poor appetite, weight loss, or overeating -   Feeling bad about yourself - or that you are a failure or have let yourself or your family down -   Trouble concentrating on things such as school, work, reading, or watching TV -   Moving or speaking so slowly that other people could have noticed; or the opposite being so fidgety that others notice -   Thoughts of being better off dead, or hurting yourself in some way -   PHQ 9 Score -   How difficult have these problems made it for you to do your work, take care of your home and get along with others -       No flowsheet data found. Fall Risk Assessment:  :     No flowsheet data found. Abuse Screening:  :     Abuse Screening Questionnaire 8/24/2018   Do you ever feel afraid of your partner? N   Are you in a relationship with someone who physically or mentally threatens you? N   Is it safe for you to go home?  Y       ADL Screening:  :     ADL Assessment 2/5/2019   Feeding yourself No Help Needed   Getting from bed to chair No Help Needed   Getting dressed No Help Needed   Bathing or showering No Help Needed   Walk across the room (includes cane/walker) No Help Needed   Using the telphone No Help Needed   Taking your medications No Help Needed   Preparing meals No Help Needed   Managing money (expenses/bills) No Help Needed   Moderately strenuous housework (laundry) No Help Needed   Shopping for personal items (toiletries/medicines) No Help Needed   Shopping for groceries No Help Needed   Driving No Help Needed   Climbing a flight of stairs No Help Needed   Getting to places beyond walking distances No Help Needed           BMI:  Weight Metrics 7/26/2019 6/25/2019 4/30/2019 2/5/2019 10/22/2018 8/24/2018 8/10/2018   Weight 245 lb 9.6 oz 258 lb 14.4 oz 257 lb 11.2 oz 258 lb 4.8 oz 251 lb 6.4 oz 250 lb 12.8 oz 248 lb 9.6 oz   BMI 30.7 kg/m2 32.36 kg/m2 32.21 kg/m2 32.29 kg/m2 30.2 kg/m2 30.13 kg/m2 29.87 kg/m2     Patient presents for Controlled Substance Prescription follow up. dextroamphetamine-amphetamine (ADDERALL) 30 mg tablet [[id number]]     Order Details   Dose: 30 mg Route: Oral Frequency: 2 TIMES DAILY   Dispense Quantity: 60 Tab Refills: 0 Fills remaining: --           Sig: Take 1 Tab by mouth two (2) times a day for 30 days. Max Daily Amount: 2 Tabs. HYDROcodone-acetaminophen (NORCO)  mg tablet [[id number]]     Order Details   Dose: 1 Tab Route: Oral Frequency: EVERY 6 HOURS AS NEEDED for Pain   Dispense Quantity: 90 Tab Refills: 0 Fills remaining: --           Sig: Take 1 Tab by mouth every six (6) hours as needed for Pain for up to 30 days. Max Daily Amount: 4 Tabs            Bottle matches last prescription. pills remaining in bottle. 15 adderall last pill taken on  07/26/2019 @ 0800 - 10 Norco last pill taken on 7/25/2019 @ 2100  Patient reports last dose taken at    printed. Y  Urine collected. N  Controlled Substance Agreement letter printed. Medication reconciliation up to date and corrected with patient at this time.

## 2019-08-02 ENCOUNTER — OFFICE VISIT (OUTPATIENT)
Dept: FAMILY MEDICINE CLINIC | Age: 44
End: 2019-08-02

## 2019-08-02 VITALS
DIASTOLIC BLOOD PRESSURE: 90 MMHG | HEART RATE: 69 BPM | BODY MASS INDEX: 30.65 KG/M2 | SYSTOLIC BLOOD PRESSURE: 140 MMHG | RESPIRATION RATE: 20 BRPM | HEIGHT: 75 IN | WEIGHT: 246.5 LBS | TEMPERATURE: 97.7 F | OXYGEN SATURATION: 100 %

## 2019-08-02 DIAGNOSIS — M25.362 INSTABILITY OF LEFT KNEE JOINT: ICD-10-CM

## 2019-08-02 DIAGNOSIS — S89.92XD INJURY OF LEFT KNEE, SUBSEQUENT ENCOUNTER: ICD-10-CM

## 2019-08-02 DIAGNOSIS — M25.562 ACUTE PAIN OF LEFT KNEE: Primary | ICD-10-CM

## 2019-08-02 NOTE — PROGRESS NOTES
Kassidy Javier  Identified pt with two pt identifiers(name and ). Chief Complaint   Patient presents with    Knee Pain     Room 4       1. Have you been to the ER, urgent care clinic since your last visit? n  Hospitalized since your last visit? n     2. Have you seen or consulted any other health care providers outside of the [address] [place] [address] since your last visit? Include any pap smears or colon screening. n       Advance Care Planning    In the event something were to happen to you and you were unable to speak on your behalf, do you have an Advance Directive/ Living Will in place stating your wishes? NO    If yes, do we have a copy on file NO    If no, would you like information NO    Medication reconciliation up to date and corrected with patient at this time. Today's provider has been notified of reason for visit, vitals and flowsheets obtained on patients. Reviewed record in preparation for visit, huddled with provider and have obtained necessary documentation.       Health Maintenance Due   Topic    Influenza Age 5 to Adult        Wt Readings from Last 3 Encounters:   19 246 lb 8 oz (111.8 kg)   19 245 lb 9.6 oz (111.4 kg)   19 258 lb 14.4 oz (117.4 kg)     Temp Readings from Last 3 Encounters:   19 97.7 °F (36.5 °C) (Oral)   19 97 °F (36.1 °C) (Oral)   19 96.4 °F (35.8 °C) (Oral)     BP Readings from Last 3 Encounters:   19 140/90   19 123/86   19 134/84     Pulse Readings from Last 3 Encounters:   19 69   19 86   19 85     Vitals:    19 0913   BP: 140/90   Pulse: 69   Resp: 20   Temp: 97.7 °F (36.5 °C)   TempSrc: Oral   SpO2: 100%   Weight: 246 lb 8 oz (111.8 kg)   Height: 6' 3\" (1.905 m)   PainSc:   6   PainLoc: Knee         Learning Assessment:  :     Learning Assessment 2015   PRIMARY LEARNER Patient Patient   HIGHEST LEVEL OF EDUCATION - PRIMARY LEARNER  GRADUATED HIGH SCHOOL OR GED - BARRIERS PRIMARY LEARNER NONE -   CO-LEARNER CAREGIVER No -   PRIMARY LANGUAGE ENGLISH ENGLISH   LEARNER PREFERENCE PRIMARY READING OTHER (COMMENT)   ANSWERED BY patient pt   RELATIONSHIP SELF SELF       Depression Screening:  :     3 most recent PHQ Screens 2/5/2019   Little interest or pleasure in doing things Not at all   Feeling down, depressed, irritable, or hopeless Not at all   Total Score PHQ 2 0   Trouble falling or staying asleep, or sleeping too much -   Feeling tired or having little energy -   Poor appetite, weight loss, or overeating -   Feeling bad about yourself - or that you are a failure or have let yourself or your family down -   Trouble concentrating on things such as school, work, reading, or watching TV -   Moving or speaking so slowly that other people could have noticed; or the opposite being so fidgety that others notice -   Thoughts of being better off dead, or hurting yourself in some way -   PHQ 9 Score -   How difficult have these problems made it for you to do your work, take care of your home and get along with others -       No flowsheet data found. Fall Risk Assessment:  :     No flowsheet data found. Abuse Screening:  :     Abuse Screening Questionnaire 8/24/2018   Do you ever feel afraid of your partner? N   Are you in a relationship with someone who physically or mentally threatens you? N   Is it safe for you to go home?  Y       ADL Screening:  :     ADL Assessment 2/5/2019   Feeding yourself No Help Needed   Getting from bed to chair No Help Needed   Getting dressed No Help Needed   Bathing or showering No Help Needed   Walk across the room (includes cane/walker) No Help Needed   Using the telphone No Help Needed   Taking your medications No Help Needed   Preparing meals No Help Needed   Managing money (expenses/bills) No Help Needed   Moderately strenuous housework (laundry) No Help Needed   Shopping for personal items (toiletries/medicines) No Help Needed   Shopping for groceries No Help Needed   Driving No Help Needed   Climbing a flight of stairs No Help Needed   Getting to places beyond walking distances No Help Needed           BMI:  Weight Metrics 8/2/2019 7/26/2019 6/25/2019 4/30/2019 2/5/2019 10/22/2018 8/24/2018   Weight 246 lb 8 oz 245 lb 9.6 oz 258 lb 14.4 oz 257 lb 11.2 oz 258 lb 4.8 oz 251 lb 6.4 oz 250 lb 12.8 oz   BMI 30.81 kg/m2 30.7 kg/m2 32.36 kg/m2 32.21 kg/m2 32.29 kg/m2 30.2 kg/m2 30.13 kg/m2           Medication reconciliation up to date and corrected with patient at this time.

## 2019-08-02 NOTE — PROGRESS NOTES
Chief Complaint   Patient presents with    Knee Pain     Room 4         HPI:  The patient is a 44 y.o. male who presents today for a follow up appointment. No hospital, ER or specialist visits since last primary care visit except as noted below. Left Knee Pain:  He continues to have joint and back pain secondary to working as [employer] as a  and . He is taking Hydrocodone in the evenings and PRN. He also has chronic pain s/p cancer treatment. He recently had a left knee injury that exacerbated his pain. He was seen at [facility] approximately 2 weeks ago. He was [place] the beach and stepped in a hole. He reports that he was unable to [facility] walk for a couple of days\" at the time of the injury. He reports he \"twisted it pretty bad\". He had an x-ray at that time that showed no dislocation or fracture per the patient. He reports that he is still in pain. He states it is bruised. Seen by me in the office for this on 7/26/2019. His pain was improving but he was not working at that time d/t knee pain. He remains out of work and returns today with worsening knee pain. He was started on Naproxen BID and continued on PRN Norco.  He was referred to Ortho but cannot see them for 3 weeks. He states that know his knee is swollen. He is wearing a knee brace. He c/o 6/10 sharp medial side of patella of the left knee. He reports that walking, weight bearing, bending \"a certain way\", stress makes it worse. He reports that the medication is helping, ice helps also. Denies numbness, tingling. He reports intermittently his knee makes him feel unstable but that has improved somewhat. He is unable to work at this time. He has [employer] paperwork pending. HTN:  He stopped taking Lisinopril and was normotensive at his last visit on 6/29/2018. However he is still not taking his medication and is normotensive. He denies CP/SOB/palpitations, HECTOR, and HA.   His BP is elevated this visit but I suspect this is related to his acute pain at this time. BP Readings from Last 3 Encounters:   19 140/90   19 123/86   19 134/84     Review of Systems  A comprehensive review of systems was negative except for that written in the HPI. Patient Active Problem List   Diagnosis Code    Adjustment reaction with anxiety and depression F43.23    Vitamin D deficiency E55.9    Hypercholesterolemia E78.00    GERD (gastroesophageal reflux disease) K21.9    ADD (attention deficit disorder) without hyperactivity F98.8    Moderate major depression ([address] F32.1    Vitamin B12 deficiency E53.8    Hypothyroidism E03.9    Thyroid disease E07.9    Other chronic pain G89.29    Erectile dysfunction following radiation therapy N52.35       Past Medical History:   Diagnosis Date    ADD (attention deficit disorder) without hyperactivity 2018    Adjustment reaction with anxiety and depression     Attention deficit hyperactivity disorder (ADHD), predominantly inattentive type 2018    Cancer ([facility] [address] [address]     testicular     Cancer ([facility] [address] 2010    Testicular    Community acquired pneumonia     Depression     GERD (gastroesophageal reflux disease)     Headache     Hypercholesterolemia     Hypertension     Thyroid disease 2017    Vitamin D deficiency        Past Surgical History:   Procedure Laterality Date    HX ORCHIECTOMY Right 2010    HX UROLOGICAL      removed on e testicle    HX WISDOM TEETH EXTRACTION         Social History     Tobacco Use    Smoking status: Former Smoker     Packs/day: 1.00     Years: 8.00     Pack years: 8.00     Types: Cigarettes     Last attempt to quit:      Years since quittin.5    Smokeless tobacco: Never Used   Substance Use Topics    Alcohol use:  Yes     Alcohol/week: 10.0 standard drinks     Types: 24 Cans of beer per week     Frequency: Monthly or less     Drinks per session: 1 or 2     Binge frequency: Less than monthly    Drug use: No       Family History   Problem Relation Age of Onset   [facility] Cancer Mother         Ovarian    Hypertension Mother     Cancer Father         Throat    Hypertension Father     Arrhythmia Brother         Afib    No Known Problems Maternal Grandmother     No Known Problems Maternal Grandfather     No Known Problems Paternal Grandmother     No Known Problems Paternal Grandfather            Current Outpatient Medications on File Prior to Visit   Medication Sig Dispense Refill    HYDROcodone-acetaminophen (NORCO)  mg tablet Take 1 Tab by mouth every six (6) hours as needed for Pain for up to 30 days. Max Daily Amount: 4 Tabs. 90 Tab 0    naproxen (NAPROSYN) 500 mg tablet Take 1 Tab by mouth two (2) times daily (with meals). 60 Tab 1    dextroamphetamine-amphetamine (ADDERALL) 20 mg tablet Take 1 Tab by mouth two (2) times a day for 30 days. Max Daily Amount: 40 mg. 60 Tab 0    tadalafil (CIALIS) 5 mg tablet Take 1 Tab by mouth daily as needed for Other (ED). 30 Tab 2    levothyroxine (SYNTHROID) 50 mcg tablet Take 1 Tab by mouth Daily (before breakfast). 90 Tab 1    atorvastatin (LIPITOR) 20 mg tablet Take 1 Tab by mouth daily. 90 Tab 1    buPROPion XL (WELLBUTRIN XL) 300 mg XL tablet Take 1 Tab by mouth every morning. 90 Tab 1    multivitamin (ONE A DAY) tablet Take 1 Tab by mouth daily.  albuterol (PROVENTIL HFA, VENTOLIN HFA, PROAIR HFA) 90 mcg/actuation inhaler Take 2 Puffs by inhalation every four (4) hours as needed for Wheezing. 1 Inhaler 2     No current facility-administered medications on file prior to visit.         No Known Allergies    PE:  Visit Vitals  /90 (BP 1 Location: Left arm, BP Patient Position: Sitting)   Pulse 69   Temp 97.7 °F (36.5 °C) (Oral)   Resp 20   Ht 6' 3\" (1.905 m)   Wt 246 lb 8 oz (111.8 kg)   SpO2 100%   BMI 30.81 kg/m²       Gen: alert, oriented, no acute distress  Head: normocephalic, atraumatic  Ears: external auditory canals clear, TMs without erythema or effusion  Eyes: pupils equal round reactive to light, sclera clear, conjunctiva clear  Oral: moist mucus membranes, no oral lesions, no pharyngeal inflammation or exudate  Neck: symmetric normal sized thyroid, no carotid bruits, no jugular vein distention  Resp: no increase work of breathing, lungs clear to ausculation bilaterally, no wheezing, rales or rhonchi  CV: S1, S2 normal.  No murmurs, rubs, or gallops. Abd: soft, not tender, not distended. No hepatosplenomegaly. Normal bowel sounds. No hernias. No abdominal or renal bruits. Neuro: cranial nerves intact, normal strength and movement in all extremities, reflexes and sensation intact and symmetric. Skin: no lesion or rash  Extremities: no cyanosis or edema    No results found for this visit on 08/02/19. Assessment/Plan:    ICD-10-CM ICD-9-CM    1. Acute pain of left knee M25.562 719.46 REFERRAL TO ORTHOPEDICS   2. Injury of left knee, subsequent encounter S89. 92XD V58.89 REFERRAL TO ORTHOPEDICS     959.7    3. Instability of left knee joint M25.362 718.86 REFERRAL TO ORTHOPEDICS     Diagnoses and all orders for this visit:    1. Acute pain of left knee  -     REFERRAL TO ORTHOPEDICS    2. Injury of left knee, subsequent encounter  -     REFERRAL TO ORTHOPEDICS    3. Instability of left knee joint  -     REFERRAL TO ORTHOPEDICS      Follow-up and Dispositions    · Return in about 3 days (around 8/5/2019) for Knee Pain F/U.       reviewed diet, exercise and weight control  reviewed medications and side effects in detail  Continue with ice, elevation, prescribed medications, will change his Orthopedic referral to urgent. Out of work at this time d/t left knee injury. Patient will either fax or bring by his [facility] paperwork first of next week. lose weight, increase physical activity, call if any problems    Health Maintenance reviewed - deferred, patient here for acute issue today.     Recommended healthy diet low in carbohydrates, fats, sodium and cholesterol. Recommended regular cardiovascular exercise 3-6 times per week for 30-60 minutes daily. Chart is reviewed and updated today in the office. Records requested for other providers patient has seen and is currently seeing. Patient was offered a choice/choices in the treatment plan today. Patient expresses understanding of the plan and agrees with recommendations. Verbal and written instructions (see AVS) provided. See patient instructions for more. Patient expresses understanding of diagnosis and treatment plan.

## 2019-08-02 NOTE — PATIENT INSTRUCTIONS
Knee Pain or Injury: Care Instructions  Your Care Instructions    Injuries are a common cause of knee problems. Sudden (acute) injuries may be caused by a direct blow to the knee. They can also be caused by abnormal twisting, bending, or falling on the knee. Pain, bruising, or swelling may be severe, and may start within minutes of the injury. Overuse is another cause of knee pain. Other causes are climbing stairs, kneeling, and other activities that use the knee. Everyday wear and tear, especially as you get older, also can cause knee pain. Rest, along with home treatment, often relieves pain and allows your knee to heal. If you have a serious knee injury, you may need tests and treatment. Follow-up care is a key part of your treatment and safety. Be sure to make and go to all appointments, and call your doctor if you are having problems. It's also a good idea to know your test results and keep a list of the medicines you take. How can you care for yourself at home? · Be safe with medicines. Read and follow all instructions on the label. ? If the doctor gave you a prescription medicine for pain, take it as prescribed. ? If you are not taking a prescription pain medicine, ask your doctor if you can take an over-the-counter medicine. · Rest and protect your knee. Take a break from any activity that may cause pain. · Put ice or a cold pack on your knee for 10 to 20 minutes at a time. Put a thin cloth between the ice and your skin. · Prop up a sore knee on a pillow when you ice it or anytime you sit or lie down for the next 3 days. Try to keep it above the level of your heart. This will help reduce swelling. · If your knee is not swollen, you can put moist heat, a heating pad, or a warm cloth on your knee. · If your doctor recommends an elastic bandage, sleeve, or other type of support for your knee, wear it as directed.   · Follow your doctor's instructions about how much weight you can put on your leg. Use a cane, crutches, or a walker as instructed. · Follow your doctor's instructions about activity during your healing process. If you can do mild exercise, slowly increase your activity. · Reach and stay at a healthy weight. Extra weight can strain the joints, especially the knees and hips, and make the pain worse. Losing even a few pounds may help. When should you call for help? Call 911 anytime you think you may need emergency care. For example, call if:    · You have symptoms of a blood clot in your lung (called a pulmonary embolism). These may include:  ? Sudden chest pain. ? Trouble breathing. ? Coughing up blood.    Call your doctor now or seek immediate medical care if:    · You have severe or increasing pain.     · Your leg or foot turns cold or changes color.     · You cannot stand or put weight on your knee.     · Your knee looks twisted or bent out of shape.     · You cannot move your knee.     · You have signs of infection, such as:  ? Increased pain, swelling, warmth, or redness. ? Red streaks leading from the knee. ? Pus draining from a place on your knee. ? A fever.     · You have signs of a blood clot in your leg (called a deep vein thrombosis), such as:  ? Pain in your calf, back of the knee, thigh, or groin. ? Redness and swelling in your leg or groin.    Watch closely for changes in your health, and be sure to contact your doctor if:    · You have tingling, weakness, or numbness in your knee.     · You have any new symptoms, such as swelling.     · You have bruises from a knee injury that last longer than 2 weeks.     · You do not get better as expected. Where can you learn more? Go to http://charissa-maria elena.info/. Enter K195 in the search box to learn more about \"Knee Pain or Injury: Care Instructions. \"  Current as of: September 23, 2018  Content Version: 12.1  © 6775-0468 Healthwise, [employer].  Care instructions adapted under license by [facility] (which disclaims liability or warranty for this information). If you have questions about a medical condition or this instruction, always ask your healthcare professional. [address] any warranty or liability for your use of this information. Joint Pain: Care Instructions  Your Care Instructions    Many people have small aches and pains from overuse or injury to muscles and joints. Joint injuries often happen during sports or recreation, work tasks, or projects around the home. An overuse injury can happen when you put too much stress on a joint or when you do an activity that stresses the joint over and over, such as using the computer or rowing a boat. You can take action at home to help your muscles and joints get better. You should feel better in 1 to 2 weeks, but it can take 3 months or more to heal completely. Follow-up care is a key part of your treatment and safety. Be sure to make and go to all appointments, and call your doctor if you are having problems. It's also a good idea to know your test results and keep a list of the medicines you take. How can you care for yourself at home? · Do not put weight on the injured joint for at least a day or two. · For the first day or two after an injury, do not take hot showers or baths, and do not use hot packs. The heat could make swelling worse. · Put ice or a cold pack on the sore joint for 10 to 20 minutes at a time. Try to do this every 1 to 2 hours for the next 3 days (when you are awake) or until the swelling goes down. Put a thin cloth between the ice and your skin. · Wrap the injury in an elastic bandage. Do not wrap it too tightly because this can cause more swelling. · Prop up the sore joint on a pillow when you ice it or anytime you sit or lie down during the next 3 days. Try to keep it above the level of your heart. This will help reduce swelling.   · Take an over-the-counter pain medicine, such as acetaminophen (Tylenol), ibuprofen (Advil, Motrin), or naproxen (Aleve). Read and follow all instructions on the label. · After 1 or 2 days of rest, begin moving the joint gently. While the joint is still healing, you can begin to exercise using activities that do not strain or hurt the painful joint. When should you call for help? Call your doctor now or seek immediate medical care if:    · You have signs of infection, such as:  ? Increased pain, swelling, warmth, and redness. ? Red streaks leading from the joint. ? A fever.    Watch closely for changes in your health, and be sure to contact your doctor if:    · Your movement or symptoms are not getting better after 1 to 2 weeks of home treatment. Where can you learn more? Go to http://charissa-maria elena.info/. Enter P205 in the search box to learn more about \"Joint Pain: Care Instructions. \"  Current as of: September 20, 2018  Content Version: 12.1  © 5131-2121 Healthwise, [employer]. Care instructions adapted under license by [employer] (which disclaims liability or warranty for this information). If you have questions about a medical condition or this instruction, always ask your healthcare professional. [place] [address] any warranty or liability for your use of this information.

## 2019-08-13 ENCOUNTER — OFFICE VISIT (OUTPATIENT)
Dept: FAMILY MEDICINE CLINIC | Age: 44
End: 2019-08-13

## 2019-08-13 VITALS
BODY MASS INDEX: 29.72 KG/M2 | RESPIRATION RATE: 16 BRPM | OXYGEN SATURATION: 100 % | DIASTOLIC BLOOD PRESSURE: 89 MMHG | HEIGHT: 75 IN | WEIGHT: 239 LBS | SYSTOLIC BLOOD PRESSURE: 139 MMHG | HEART RATE: 83 BPM | TEMPERATURE: 97.5 F

## 2019-08-13 DIAGNOSIS — M25.362 INSTABILITY OF LEFT KNEE JOINT: ICD-10-CM

## 2019-08-13 DIAGNOSIS — F98.8 ADD (ATTENTION DEFICIT DISORDER) WITHOUT HYPERACTIVITY: ICD-10-CM

## 2019-08-13 DIAGNOSIS — E53.8 VITAMIN B12 DEFICIENCY: ICD-10-CM

## 2019-08-13 DIAGNOSIS — N52.35 ERECTILE DYSFUNCTION FOLLOWING RADIATION THERAPY: ICD-10-CM

## 2019-08-13 DIAGNOSIS — Z13.1 DIABETES MELLITUS SCREENING: ICD-10-CM

## 2019-08-13 DIAGNOSIS — E03.9 HYPOTHYROIDISM, UNSPECIFIED TYPE: ICD-10-CM

## 2019-08-13 DIAGNOSIS — Z20.2 EXPOSURE TO SEXUALLY TRANSMITTED DISEASE (STD): ICD-10-CM

## 2019-08-13 DIAGNOSIS — R53.83 FATIGUE, UNSPECIFIED TYPE: ICD-10-CM

## 2019-08-13 DIAGNOSIS — Z51.81 ENCOUNTER FOR MEDICATION MONITORING: ICD-10-CM

## 2019-08-13 DIAGNOSIS — R63.4 WEIGHT LOSS: ICD-10-CM

## 2019-08-13 DIAGNOSIS — E78.00 HYPERCHOLESTEROLEMIA: ICD-10-CM

## 2019-08-13 DIAGNOSIS — E55.9 VITAMIN D DEFICIENCY: ICD-10-CM

## 2019-08-13 DIAGNOSIS — M25.562 ACUTE PAIN OF LEFT KNEE: ICD-10-CM

## 2019-08-13 DIAGNOSIS — F43.23 ADJUSTMENT REACTION WITH ANXIETY AND DEPRESSION: ICD-10-CM

## 2019-08-13 DIAGNOSIS — G89.29 OTHER CHRONIC PAIN: ICD-10-CM

## 2019-08-13 DIAGNOSIS — S89.92XD INJURY OF LEFT KNEE, SUBSEQUENT ENCOUNTER: Primary | ICD-10-CM

## 2019-08-13 PROBLEM — S89.92XA INJURY OF LEFT KNEE: Status: ACTIVE | Noted: 2019-08-13

## 2019-08-13 RX ORDER — TADALAFIL 5 MG/1
5 TABLET ORAL
Qty: 30 TAB | Refills: 2 | Status: SHIPPED | OUTPATIENT
Start: 2019-08-13 | End: 2019-10-18 | Stop reason: ALTCHOICE

## 2019-08-13 RX ORDER — DEXTROAMPHETAMINE SACCHARATE, AMPHETAMINE ASPARTATE, DEXTROAMPHETAMINE SULFATE AND AMPHETAMINE SULFATE 7.5; 7.5; 7.5; 7.5 MG/1; MG/1; MG/1; MG/1
TABLET ORAL
Refills: 0 | COMMUNITY
Start: 2019-08-07 | End: 2019-09-04 | Stop reason: ALTCHOICE

## 2019-08-13 RX ORDER — CYANOCOBALAMIN 1000 UG/ML
1000 INJECTION, SOLUTION INTRAMUSCULAR; SUBCUTANEOUS ONCE
Qty: 1 ML | Refills: 0
Start: 2019-08-13 | End: 2019-08-13

## 2019-08-13 NOTE — PATIENT INSTRUCTIONS
Knee Pain or Injury: Care Instructions Your Care Instructions Injuries are a common cause of knee problems. Sudden (acute) injuries may be caused by a direct blow to the knee. They can also be caused by abnormal twisting, bending, or falling on the knee. Pain, bruising, or swelling may be severe, and may start within minutes of the injury. Overuse is another cause of knee pain. Other causes are climbing stairs, kneeling, and other activities that use the knee. Everyday wear and tear, especially as you get older, also can cause knee pain. Rest, along with home treatment, often relieves pain and allows your knee to heal. If you have a serious knee injury, you may need tests and treatment. Follow-up care is a key part of your treatment and safety. Be sure to make and go to all appointments, and call your doctor if you are having problems. It's also a good idea to know your test results and keep a list of the medicines you take. How can you care for yourself at home? · Be safe with medicines. Read and follow all instructions on the label. ? If the doctor gave you a prescription medicine for pain, take it as prescribed. ? If you are not taking a prescription pain medicine, ask your doctor if you can take an over-the-counter medicine. · Rest and protect your knee. Take a break from any activity that may cause pain. · Put ice or a cold pack on your knee for 10 to 20 minutes at a time. Put a thin cloth between the ice and your skin. · Prop up a sore knee on a pillow when you ice it or anytime you sit or lie down for the next 3 days. Try to keep it above the level of your heart. This will help reduce swelling. · If your knee is not swollen, you can put moist heat, a heating pad, or a warm cloth on your knee. · If your doctor recommends an elastic bandage, sleeve, or other type of support for your knee, wear it as directed.  
· Follow your doctor's instructions about how much weight you can put on your leg. Use a cane, crutches, or a walker as instructed. · Follow your doctor's instructions about activity during your healing process. If you can do mild exercise, slowly increase your activity. · Reach and stay at a healthy weight. Extra weight can strain the joints, especially the knees and hips, and make the pain worse. Losing even a few pounds may help. When should you call for help? Call 911 anytime you think you may need emergency care. For example, call if: 
  · You have symptoms of a blood clot in your lung (called a pulmonary embolism). These may include: 
? Sudden chest pain. ? Trouble breathing. ? Coughing up blood.  
 Call your doctor now or seek immediate medical care if: 
  · You have severe or increasing pain.  
  · Your leg or foot turns cold or changes color.  
  · You cannot stand or put weight on your knee.  
  · Your knee looks twisted or bent out of shape.  
  · You cannot move your knee.  
  · You have signs of infection, such as: 
? Increased pain, swelling, warmth, or redness. ? Red streaks leading from the knee. ? Pus draining from a place on your knee. ? A fever.  
  · You have signs of a blood clot in your leg (called a deep vein thrombosis), such as: 
? Pain in your calf, back of the knee, thigh, or groin. ? Redness and swelling in your leg or groin.  
 Watch closely for changes in your health, and be sure to contact your doctor if: 
  · You have tingling, weakness, or numbness in your knee.  
  · You have any new symptoms, such as swelling.  
  · You have bruises from a knee injury that last longer than 2 weeks.  
  · You do not get better as expected. Where can you learn more? Go to http://charissa-maria elena.info/. Enter K195 in the search box to learn more about \"Knee Pain or Injury: Care Instructions. \" Current as of: September 23, 2018 Content Version: 12.1 © 1065-5265 Healthwise, [employer].  Care instructions adapted under license by [address] (which disclaims liability or warranty for this information). If you have questions about a medical condition or this instruction, always ask your healthcare professional. [address] any warranty or liability for your use of this information.

## 2019-08-13 NOTE — PROGRESS NOTES
Identified pt with two pt identifiers(name and ). Reviewed record in preparation for visit and have obtained necessary documentation. Chief Complaint   Patient presents with   [facility] Follow Up     left knee, torn MCL         Health Maintenance Due   Topic    Influenza Age 5 to Adult        Coordination of Care Questionnaire:   1) Have you been to an emergency room, urgent care, or hospitalized since your last visit? If yes, where when, and reason for visit? yes       2. Have seen or consulted any other health care provider since your last visit? If yes, where when, and reason for visit? NO      3) Do you have an Advanced Directive/ Living Will in place? NO  If yes, do we have a copy on file NO  If no, would you like information NO    Patient is accompanied by self I have received verbal consent from Eden Coffman to discuss any/all medical information while they are present in the room.     Visit Vitals  BP (!) 141/104 (BP 1 Location: Left arm, BP Patient Position: Sitting)   Pulse 83   Temp 97.5 °F (36.4 °C) (Oral)   Resp 16   Ht 6' 3\" (1.905 m)   Wt 239 lb (108.4 kg)   SpO2 100%   BMI 29.87 kg/m²

## 2019-08-13 NOTE — PROGRESS NOTES
Chief Complaint   Patient presents with   [facility] Follow Up     left knee, torn MCL          HPI:  The patient is a 44 y.o. male who presents today for a follow up appointment. No hospital, ER or specialist visits since last primary care visit except as noted below. Left Knee Pain:  He continues to have joint and back pain secondary to working as [employer] as a  and . He is taking Hydrocodone in the evenings and PRN. He also has chronic pain s/p cancer treatment. He recently had a left knee injury that exacerbated his pain. He was seen at [facility] approximately 2 weeks ago. He was [place] the beach and stepped in a hole. He reports that he was unable to [facility] walk for a couple of days\" at the time of the injury. He reports he \"twisted it pretty bad\". He had an x-ray at that time that showed no dislocation or fracture per the patient. He reports that he is still in pain. He states it is bruised. Seen by me in the office for this on 7/26/2019. His pain was improving but he was not working at that time d/t knee pain. He remains out of work and returns today with worsening knee pain. He was started on Naproxen BID and continued on PRN Norco.  He was referred to Ortho but cannot see them for 3 weeks. He states that know his knee is swollen. He is wearing a knee brace. He c/o 6/10 sharp medial side of patella of the left knee. He reports that walking, weight bearing, bending \"a certain way\", stress makes it worse. He reports that the medication is helping, ice helps also. Denies numbness, tingling. He reports intermittently his knee makes him feel unstable but that has improved somewhat. He is unable to work at this time. He was referred to Orthopedics at his last visit. He is continuing with PT and will f/u with them again as needed. He has FMLA paperwork in the office today to be filled out, he will RTW on 8/15/2019.     Review of Systems  A comprehensive review of systems was negative except for that written in the HPI. Patient Active Problem List   Diagnosis Code    Adjustment reaction with anxiety and depression F43.23    Vitamin D deficiency E55.9    Hypercholesterolemia E78.00    GERD (gastroesophageal reflux disease) K21.9    ADD (attention deficit disorder) without hyperactivity F98.8    Moderate major depression ([facility] F32.1    Vitamin B12 deficiency E53.8    Hypothyroidism E03.9    Thyroid disease E07.9    Other chronic pain G89.29    Erectile dysfunction following radiation therapy N52.35    Acute pain of left knee M25.562    Injury of left knee S89. 92XA    Instability of left knee joint M25.362       Past Medical History:   Diagnosis Date    ADD (attention deficit disorder) without hyperactivity 2018    Adjustment reaction with anxiety and depression     Attention deficit hyperactivity disorder (ADHD), predominantly inattentive type 2018    [facility]     test[facility]     Cancer ([facility] 2010    Testicular    Community acquired pneumonia     Depression     GERD (gastroesophageal reflux disease)     Headache     Hypercholesterolemia     Hypertension     Thyroid disease 2017    Vitamin D deficiency        Past Surgical History:   Procedure Laterality Date    HX ORCHIECTOMY Right 2010    HX UROLOGICAL      removed on e testicle    HX WISDOM TEETH EXTRACTION         Social History     Tobacco Use    Smoking status: Former Smoker     Packs/day: 1.00     Years: 8.00     Pack years: 8.00     Types: Cigarettes     Last attempt to quit:      Years since quittin.6    Smokeless tobacco: Never Used   Substance Use Topics    Alcohol use:  Yes     Alcohol/week: 10.0 standard drinks     Types: 24 Cans of beer per week     Frequency: Monthly or less     Drinks per session: 1 or 2     Binge frequency: Less than monthly    Drug use: No       Family History   Problem Relation Age of Onset    Cancer Mother Ovarian    Hypertension Mother     Cancer Father         Throat    Hypertension Father     Arrhythmia Brother         Afib    No Known Problems Maternal Grandmother     No Known Problems Maternal Grandfather     No Known Problems Paternal Grandmother     No Known Problems Paternal Grandfather        Outpatient Medications Marked as Taking for the 19 encounter (Office Visit) with Elmer Coon NP   Medication Sig Dispense Refill    dextroamphetamine-amphetamine (ADDERALL) 30 mg tablet TAKE 1 TABLET BY MOUTH TWICE A DAY -MAX DAILY AMOUNT 2 TABLET  0    [] cyanocobalamin (VITAMIN B-12) 1,000 mcg/mL injection 1 mL by IntraMUSCular route once for 1 dose. 1 mL 0    tadalafil (CIALIS) 5 mg tablet Take 1 Tab by mouth daily as needed for Other (ED). 30 Tab 2    HYDROcodone-acetaminophen (NORCO)  mg tablet Take 1 Tab by mouth every six (6) hours as needed for Pain for up to 30 days. Max Daily Amount: 4 Tabs. 90 Tab 0    naproxen (NAPROSYN) 500 mg tablet Take 1 Tab by mouth two (2) times daily (with meals). 60 Tab 1    levothyroxine (SYNTHROID) 50 mcg tablet Take 1 Tab by mouth Daily (before breakfast). 90 Tab 1    atorvastatin (LIPITOR) 20 mg tablet Take 1 Tab by mouth daily. 90 Tab 1    buPROPion XL (WELLBUTRIN XL) 300 mg XL tablet Take 1 Tab by mouth every morning. 90 Tab 1    multivitamin (ONE A DAY) tablet Take 1 Tab by mouth daily. Current Outpatient Medications on File Prior to Visit   Medication Sig Dispense Refill    dextroamphetamine-amphetamine (ADDERALL) 30 mg tablet TAKE 1 TABLET BY MOUTH TWICE A DAY -MAX DAILY AMOUNT 2 TABLET  0    HYDROcodone-acetaminophen (NORCO)  mg tablet Take 1 Tab by mouth every six (6) hours as needed for Pain for up to 30 days. Max Daily Amount: 4 Tabs. 90 Tab 0    naproxen (NAPROSYN) 500 mg tablet Take 1 Tab by mouth two (2) times daily (with meals).  60 Tab 1    levothyroxine (SYNTHROID) 50 mcg tablet Take 1 Tab by mouth Daily (before breakfast). 90 Tab 1    atorvastatin (LIPITOR) 20 mg tablet Take 1 Tab by mouth daily. 90 Tab 1    buPROPion XL (WELLBUTRIN XL) 300 mg XL tablet Take 1 Tab by mouth every morning. 90 Tab 1    multivitamin (ONE A DAY) tablet Take 1 Tab by mouth daily.  dextroamphetamine-amphetamine (ADDERALL) 20 mg tablet Take 1 Tab by mouth two (2) times a day for 30 days. Max Daily Amount: 40 mg. 60 Tab 0    albuterol (PROVENTIL HFA, VENTOLIN HFA, PROAIR HFA) 90 mcg/actuation inhaler Take 2 Puffs by inhalation every four (4) hours as needed for Wheezing. 1 Inhaler 2     No current facility-administered medications on file prior to visit. No Known Allergies    PE:  Visit Vitals  /89   Pulse 83   Temp 97.5 °F (36.4 °C) (Oral)   Resp 16   Ht 6' 3\" (1.905 m)   Wt 239 lb (108.4 kg)   SpO2 100%   BMI 29.87 kg/m²       Gen: alert, oriented, no acute distress  Head: normocephalic, atraumatic  Ears: external auditory canals clear, TMs without erythema or effusion  Eyes: pupils equal round reactive to light, sclera clear, conjunctiva clear  Oral: moist mucus membranes, no oral lesions, no pharyngeal inflammation or exudate  Neck: symmetric normal sized thyroid, no carotid bruits, no jugular vein distention  Resp: no increase work of breathing, lungs clear to ausculation bilaterally, no wheezing, rales or rhonchi  CV: S1, S2 normal.  No murmurs, rubs, or gallops. Abd: soft, not tender, not distended. No hepatosplenomegaly. Normal bowel sounds. No hernias. No abdominal or renal bruits. Neuro: cranial nerves intact, normal strength and movement in all extremities, reflexes and sensation intact and symmetric. Skin: no lesion or rash  Extremities: no cyanosis or edema    No results found for this visit on 08/13/19. Assessment/Plan:    ICD-10-CM ICD-9-CM    1. Injury of left knee, subsequent encounter S89. 92XD V58.89      959.7    2. Acute pain of left knee M25.562 719.46    3.  Vitamin B12 deficiency E53.8 266.2 VITAMIN B12 INJECTION      THER/PROPH/DIAG INJECTION, SUBCUT/IM      cyanocobalamin (VITAMIN B-12) 1,000 mcg/mL injection      VITAMIN B12      VITAMIN B12   4. Instability of left knee joint M25.362 718.86    5. Hypothyroidism, unspecified type E03.9 244.9 TSH 3RD GENERATION      CANCELED: TSH 3RD GENERATION   6. Adjustment reaction with anxiety and depression F43.23 309.28    7. Vitamin D deficiency E55.9 268.9 VITAMIN D, 25 HYDROXY      CANCELED: VITAMIN D, 25 HYDROXY   8. Hypercholesterolemia E78.00 272.0 LIPID PANEL      CANCELED: LIPID PANEL   9. Erectile dysfunction following radiation therapy N52.35 607.84 TESTOSTERONE, FREE & TOTAL      tadalafil (CIALIS) 5 mg tablet      CANCELED: TESTOSTERONE, FREE & TOTAL   10. ADD (attention deficit disorder) without hyperactivity F98.8 314.00 COMPLIANCE DRUG SCREEN/PRESCRIPTION MONITORING   11. Other chronic pain G89.29 338.29    12. Exposure to sexually transmitted disease (STD) Z20.2 V01.6 CBC WITH AUTOMATED DIFF      RPR      HEPATITIS PANEL, ACUTE      CHLAMYDIA/GC PCR      HIV 1/2 AG/AB, 4TH GENERATION,W RFLX CONFIRM      HSV1/HSV2(IGG/M)      CANCELED: URINALYSIS W/ RFLX MICROSCOPIC      CANCELED: CBC WITH AUTOMATED DIFF      CANCELED: RPR      CANCELED: HEPATITIS PANEL, ACUTE      CANCELED: TESTOSTERONE, FREE & TOTAL      CANCELED: CHLAMYDIA/GC PCR      CANCELED: HSV1/HSV2(IGG/M)      CANCELED: HIV 1/2 AG/AB, 4TH GENERATION,W RFLX CONFIRM   13.  Fatigue, unspecified type R53.83 780.79 VITAMIN B12      URINALYSIS W/ RFLX MICROSCOPIC      LIPID PANEL      METABOLIC PANEL, COMPREHENSIVE      TSH 3RD GENERATION      VITAMIN D, 25 HYDROXY      HEMOGLOBIN A1C WITH EAG      CBC WITH AUTOMATED DIFF      TESTOSTERONE, FREE & TOTAL      COMPLIANCE DRUG SCREEN/PRESCRIPTION MONITORING      RPR      HEPATITIS PANEL, ACUTE      CHLAMYDIA/GC PCR      HIV 1/2 AG/AB, 4TH GENERATION,W RFLX CONFIRM      HSV1/HSV2(IGG/M)      VITAMIN B12      CANCELED: URINALYSIS W/ RFLX MICROSCOPIC      CANCELED: LIPID PANEL      CANCELED: METABOLIC PANEL, COMPREHENSIVE      CANCELED: TSH 3RD GENERATION      CANCELED: VITAMIN D, 25 HYDROXY      CANCELED: COMPLIANCE DRUG SCREEN/PRESCRIPTION MONITORING      CANCELED: HEMOGLOBIN A1C WITH EAG      CANCELED: CBC WITH AUTOMATED DIFF      CANCELED: RPR      CANCELED: HEPATITIS PANEL, ACUTE      CANCELED: TESTOSTERONE, FREE & TOTAL      CANCELED: CHLAMYDIA/GC PCR      CANCELED: HSV1/HSV2(IGG/M)      CANCELED: HIV 1/2 AG/AB, 4TH GENERATION,W RFLX CONFIRM   14. Diabetes mellitus screening Z13.1 V77.1 CANCELED: URINALYSIS W/ RFLX MICROSCOPIC      CANCELED: METABOLIC PANEL, COMPREHENSIVE      CANCELED: HEMOGLOBIN A1C WITH EAG   15. Encounter for medication monitoring Z51.81 V58.83 COMPLIANCE DRUG SCREEN/PRESCRIPTION MONITORING      CANCELED: COMPLIANCE DRUG SCREEN/PRESCRIPTION MONITORING   16. Weight loss R63.4 783.21 TSH 3RD GENERATION      CANCELED: TSH 3RD GENERATION     Diagnoses and all orders for this visit:    1. Injury of left knee, subsequent encounter    2. Acute pain of left knee    3. Vitamin B12 deficiency  -     VITAMIN B12 INJECTION  -     THER/PROPH/DIAG INJECTION, SUBCUT/IM  -     cyanocobalamin (VITAMIN B-12) 1,000 mcg/mL injection; 1 mL by IntraMUSCular route once for 1 dose. -     VITAMIN B12  -     VITAMIN B12; Future    4. Instability of left knee joint    5. Hypothyroidism, unspecified type  -     TSH 3RD GENERATION; Future    6. Adjustment reaction with anxiety and depression    7. Vitamin D deficiency  -     VITAMIN D, 25 HYDROXY; Future    8. Hypercholesterolemia  -     LIPID PANEL; Future    9. Erectile dysfunction following radiation therapy  -     TESTOSTERONE, FREE & TOTAL; Future  -     tadalafil (CIALIS) 5 mg tablet; Take 1 Tab by mouth daily as needed for Other (ED). 10. ADD (attention deficit disorder) without hyperactivity  -     COMPLIANCE DRUG SCREEN/PRESCRIPTION MONITORING; Future    11. Other chronic pain    12. Exposure to sexually transmitted disease (STD)  -     CBC WITH AUTOMATED DIFF; Future  -     RPR; Future  -     HEPATITIS PANEL, ACUTE; Future  -     CHLAMYDIA/GC PCR; Future  -     HIV 1/2 AG/AB, 4TH GENERATION,W RFLX CONFIRM; Future  -     HSV1/HSV2(IGG/M); Future    13. Fatigue, unspecified type  -     VITAMIN B12  -     URINALYSIS W/ RFLX MICROSCOPIC; Future  -     LIPID PANEL; Future  -     METABOLIC PANEL, COMPREHENSIVE; Future  -     TSH 3RD GENERATION; Future  -     VITAMIN D, 25 HYDROXY; Future  -     HEMOGLOBIN A1C WITH EAG; Future  -     CBC WITH AUTOMATED DIFF; Future  -     TESTOSTERONE, FREE & TOTAL; Future  -     COMPLIANCE DRUG SCREEN/PRESCRIPTION MONITORING; Future  -     RPR; Future  -     HEPATITIS PANEL, ACUTE; Future  -     CHLAMYDIA/GC PCR; Future  -     HIV 1/2 AG/AB, 4TH GENERATION,W RFLX CONFIRM; Future  -     HSV1/HSV2(IGG/M); Future  -     VITAMIN B12; Future    14. Diabetes mellitus screening    15. Encounter for medication monitoring  -     COMPLIANCE DRUG SCREEN/PRESCRIPTION MONITORING; Future    16. Weight loss  -     TSH 3RD GENERATION; Future      Follow-up and Dispositions    · Return in about 2 months (around 10/13/2019) for Medication Check, ADHD/ADD Med Refill.       lab results and schedule of future lab studies reviewed with patient  reviewed diet, exercise and weight control  reviewed medications and side effects in detail - controlled substance agreement renewed today, form reviewed and completed with patient, see letters  FMLA paperwork completed, faxed, scanned to the patient's chart and original given back to the patient, patient to RTW on 8/15/2019. call if any problems    Health Maintenance reviewed - deferred. Recommended healthy diet low in carbohydrates, fats, sodium and cholesterol. Recommended regular cardiovascular exercise 3-6 times per week for 30-60 minutes daily. Chart is reviewed and updated today in the office.   Records requested for other providers patient has seen and is currently seeing. Patient was offered a choice/choices in the treatment plan today. Patient expresses understanding of the plan and agrees with recommendations. Verbal and written instructions (see AVS) provided. See patient instructions for more. Patient expresses understanding of diagnosis and treatment plan.

## 2019-08-13 NOTE — LETTER
NOTIFICATION RETURN TO WORK / SCHOOL 
 
8/13/2019 12:49 PM 
 
Mr. Anaid John 
[facility] [address] [phone] To Whom It May Concern: 
 
Anaid John is currently under the care of [facility]. Please excuse Mr. Charlie Bryant from work from 7/22/2019 to 8/14/2019. He will return to work on: 8/15/2019 without restriction. If there are questions or concerns please have the patient contact our office. Sincerely, Viktor Salazar NP

## 2019-08-13 NOTE — LETTER
Name:.Kenn Bianchi :1975 MR #:[id number] Provider Name:Lizzy Salinas NP  
*[id number]* [id number] () Page 1 of 5 Initial [employer] CONTROLLED SUBSTANCE AGREEMENT I may be prescribed medications that are controlled substances as part  of my treatment plan for management of my medical condition(s). The goal of my treatment plan is to maintain and/or improve my health and wellbeing. Because controlled substances have an increased risk of abuse or harm, continual re-evaluation is needed determine if the goals of my treatment plan are being met for my safety and the safety of others. Piter Jaffe  am entering into this Controlled Substance Agreement with my provider, Baldemar Guo NP at [facility] . I understand that successful treatment requires mutual trust and honesty between me and my provider. I understand that there are state and federal laws and regulations which apply to the medications that my provider may prescribe that must be followed. I understand there are risks and benefits ts of taking the medicines that my provider may prescribe. I understand and agree that following this Agreement is necessary in continuing my provider-patient relationship and success of my treatment plan. As a part of my treatment plan, I agree to the following: COMMUNICATION: 
 
1. I will communicate fully with my provider about my medical condition(s), including the effect on my daily life and how well my medications are helping. I will tell my provider all of the medications that I take for any reason, including medications I receive from another health care provider, and will notify my provider about all issues, problems or concerns, including any side effects, which may be related to my medications. I understand that this information allows my provider to adjust my treatment plan to help manage my medical condition.  I understand that this information will become part of my permanent medical record. 2. I will notify my provider if I have a history of alcohol/drug misuse/addiction or if I have had treatment for alcohol/drug addiction in the past, or if I have a new problem with or concern about alcohol/drug use/addiction, because this increases the likelihood of high risk behaviors and may lead to serious medical conditions. 3. Females Only: I will notify my provider if I am or become pregnant, or if I intend to become pregnant, or if I intend to breastfeed. I understand that communication of these issues with my provider is important, due to possible effects my medication could have on an unborn fetus or breastfeeding child. Name:Toni Willson :1975 MR #:[id number] Provider Name:Rojelio Salinas, JANIYA  
*[id number]* BSMG-491 () Page 2 of 5 Initial [employer] MISUSE OF MEDICATIONS / DRUGS: 
 
1. I agree to take all controlled substances as prescribed, and will not misuse or abuse any controlled substances prescribed by my provider. For my safety, I will not increase the amount of medicine I take without first talking with and getting permission from my provider. 2. If I have a medical emergency, another health care provider may prescribe me medication. If I seek emergency treatment, I will notify my provider within seventy-two (72) hours. 3. I understand that my provider may discuss my use and/or possible misuse/abuse of controlled substances and alcohol, as appropriate, with any health care provider involved in my care, pharmacist or legal authority. ILLEGAL DRUGS: 
 
1. I will not use illegal drugs of any kind, including but not limited to marijuana, heroin, cocaine, or any prescription drug which is not prescribed to me. DRUG DIVERSION / PRESCRIPTION FRAUD: 
 
1. I will not share, sell, trade, give away, or otherwise misuse my prescriptions or medications. 2. I will not alter any prescriptions provided to me by my provider. SINGLE PROVIDER: 
 
1. I agree that all controlled substances that I take will be prescribed only by my provider (or his/her covering provider) under this Agreement. This agreement does not prevent me from seeking emergency medical treatment or receiving pain management related to a surgery. PROTECTING MEDICATIONS: 
 
1. I am responsible for keeping my prescriptions and medications in a safe and secure place including safeguarding them from loss or theft. I understand that lost, stolen or damaged/destroyed prescriptions or medications will not be replaced. Name:Toni Kearns :1975 MR #:[id number] Provider Name:Karyn Salinas, JANIYA  
*[id number]* BSMG-491 () Page 3 of 5 Initial [email or web address] PRESCRIPTION RENEWALS/REFILLS: 
 
1. I will follow my controlled substance medication schedule as prescribed by my provider. 2. I understand and agree that I will make any requests for renewals or refills of my prescriptions only at the time of an office visit or during my providers regular office hours subject to the prescription refill requirements of the individual practice. 3. I understand that my provider may not call in prescriptions for controlled substances to my pharmacy. 4. I understand that my provider may adjust or discontinue these medications as deemed appropriate for my medical treatment plan. This Agreement does not guarantee the prescription of controlled medications. 5. I agree that if my medications are adjusted or discontinued, I will properly dispose of any remaining medications. I understand that I will be required to dispose of any remaining controlled medications prior to being provided with any prescriptions for other controlled medications.  
 
 
1. I authorize my provider and my pharmacy to cooperate fully with any local, state, or federal law enforcement agency in the investigation of any possible misuse, sale, or other diversion of my controlled substance prescriptions or medications. RISKS: 
 
 
1. I understand that if I do not adhere to this Agreement in any way, my provider may change my prescriptions, stop prescribing controlled substances or end our provider-patient relationship. 2. If my provider decides to stop prescribing medication, or decides to end our provider-patient relationship,my provider may require that I taper my medications slowly. If necessary, my provider may also provide a prescription for other medications to treat my withdrawal symptoms. UNDERSTANDING THIS AGREEMENT: 
 
I understand that my provider may adjust or stop my prescriptions for controlled substances based on my medical condition and my treatment plan. I understand that this Agreement does not guarantee that I will be prescribed medications or controlled substances. I understand that controlled substances may be just one part 
of my treatment plan. My initial on each page and my signature below shows that I have read each page of this Agreement, I have had an opportunity to ask questions, and all of my questions have been answered to my satisfaction by my provider. By signing below, I agree to comply with this Agreement, and I understand that if I do not follow the Agreements listed above, my provider may stop 
 
 
 
_________________________________________  Date/Time 8/13/2019 1:13 PM   
             (Patient Signature)

## 2019-08-14 DIAGNOSIS — E03.9 HYPOTHYROIDISM, UNSPECIFIED TYPE: ICD-10-CM

## 2019-08-14 DIAGNOSIS — E78.00 HYPERCHOLESTEROLEMIA: ICD-10-CM

## 2019-08-14 DIAGNOSIS — F98.8 ADD (ATTENTION DEFICIT DISORDER) WITHOUT HYPERACTIVITY: ICD-10-CM

## 2019-08-14 DIAGNOSIS — E55.9 VITAMIN D DEFICIENCY: ICD-10-CM

## 2019-08-14 DIAGNOSIS — Z51.81 ENCOUNTER FOR MEDICATION MONITORING: ICD-10-CM

## 2019-08-14 DIAGNOSIS — Z20.2 EXPOSURE TO SEXUALLY TRANSMITTED DISEASE (STD): ICD-10-CM

## 2019-08-14 DIAGNOSIS — R53.83 FATIGUE, UNSPECIFIED TYPE: ICD-10-CM

## 2019-08-14 DIAGNOSIS — R63.4 WEIGHT LOSS: ICD-10-CM

## 2019-08-14 DIAGNOSIS — N52.35 ERECTILE DYSFUNCTION FOLLOWING RADIATION THERAPY: ICD-10-CM

## 2019-08-14 DIAGNOSIS — E53.8 VITAMIN B12 DEFICIENCY: ICD-10-CM

## 2019-08-16 ENCOUNTER — TELEPHONE (OUTPATIENT)
Dept: FAMILY MEDICINE CLINIC | Age: 44
End: 2019-08-16

## 2019-08-20 ENCOUNTER — OFFICE VISIT (OUTPATIENT)
Dept: FAMILY MEDICINE CLINIC | Age: 44
End: 2019-08-20

## 2019-08-20 VITALS
DIASTOLIC BLOOD PRESSURE: 88 MMHG | HEART RATE: 77 BPM | BODY MASS INDEX: 30.59 KG/M2 | SYSTOLIC BLOOD PRESSURE: 131 MMHG | OXYGEN SATURATION: 98 % | RESPIRATION RATE: 19 BRPM | HEIGHT: 75 IN | TEMPERATURE: 98.2 F | WEIGHT: 246 LBS

## 2019-08-20 DIAGNOSIS — F32.1 MODERATE MAJOR DEPRESSION (HCC): Primary | ICD-10-CM

## 2019-08-20 DIAGNOSIS — F98.8 ADD (ATTENTION DEFICIT DISORDER) WITHOUT HYPERACTIVITY: ICD-10-CM

## 2019-08-20 DIAGNOSIS — S89.92XD INJURY OF LEFT KNEE, SUBSEQUENT ENCOUNTER: ICD-10-CM

## 2019-08-20 LAB
25(OH)D3+25(OH)D2 SERPL-MCNC: 34.1 NG/ML (ref 30–100)
ALBUMIN SERPL-MCNC: 4.7 G/DL (ref 3.5–5.5)
ALBUMIN/GLOB SERPL: 2 {RATIO} (ref 1.2–2.2)
ALP SERPL-CCNC: 84 IU/L (ref 39–117)
ALT SERPL-CCNC: 48 IU/L (ref 0–44)
APPEARANCE UR: CLEAR
AST SERPL-CCNC: 20 IU/L (ref 0–40)
BASOPHILS # BLD AUTO: 0.1 X10E3/UL (ref 0–0.2)
BASOPHILS NFR BLD AUTO: 1 %
BILIRUB SERPL-MCNC: 0.3 MG/DL (ref 0–1.2)
BILIRUB UR QL STRIP: NEGATIVE
BUN SERPL-MCNC: 10 MG/DL (ref 6–24)
BUN/CREAT SERPL: 10 (ref 9–20)
C TRACH RRNA SPEC QL NAA+PROBE: NEGATIVE
CALCIUM SERPL-MCNC: 9.7 MG/DL (ref 8.7–10.2)
CHLORIDE SERPL-SCNC: 103 MMOL/L (ref 96–106)
CHOLEST SERPL-MCNC: 186 MG/DL (ref 100–199)
CO2 SERPL-SCNC: 20 MMOL/L (ref 20–29)
COLOR UR: YELLOW
CREAT SERPL-MCNC: 0.98 MG/DL (ref 0.76–1.27)
EOSINOPHIL # BLD AUTO: 0.1 X10E3/UL (ref 0–0.4)
EOSINOPHIL NFR BLD AUTO: 1 %
ERYTHROCYTE [DISTWIDTH] IN BLOOD BY AUTOMATED COUNT: 12.6 % (ref 12.3–15.4)
EST. AVERAGE GLUCOSE BLD GHB EST-MCNC: 103 MG/DL
GLOBULIN SER CALC-MCNC: 2.3 G/DL (ref 1.5–4.5)
GLUCOSE SERPL-MCNC: 95 MG/DL (ref 65–99)
GLUCOSE UR QL: NEGATIVE
HAV IGM SERPL QL IA: NEGATIVE
HBA1C MFR BLD: 5.2 % (ref 4.8–5.6)
HBV CORE IGM SERPL QL IA: NEGATIVE
HBV SURFACE AG SERPL QL IA: NEGATIVE
HCT VFR BLD AUTO: 46.5 % (ref 37.5–51)
HCV AB S/CO SERPL IA: <0.1 S/CO RATIO (ref 0–0.9)
HDLC SERPL-MCNC: 48 MG/DL
HGB BLD-MCNC: 16 G/DL (ref 13–17.7)
HGB UR QL STRIP: NEGATIVE
HIV 1+2 AB+HIV1 P24 AG SERPL QL IA: NON REACTIVE
HSV1 IGG SER IA-ACNC: 13.8 INDEX (ref 0–0.9)
HSV1 IGM TITR SER IF: ABNORMAL TITER
HSV2 IGG SER IA-ACNC: <0.91 INDEX (ref 0–0.9)
HSV2 IGM TITR SER IF: ABNORMAL TITER
IMM GRANULOCYTES # BLD AUTO: 0 X10E3/UL (ref 0–0.1)
IMM GRANULOCYTES NFR BLD AUTO: 0 %
INTERPRETATION, 910389: NORMAL
KETONES UR QL STRIP: NEGATIVE
LDLC SERPL CALC-MCNC: 116 MG/DL (ref 0–99)
LEUKOCYTE ESTERASE UR QL STRIP: NEGATIVE
LYMPHOCYTES # BLD AUTO: 2.7 X10E3/UL (ref 0.7–3.1)
LYMPHOCYTES NFR BLD AUTO: 35 %
MCH RBC QN AUTO: 29.2 PG (ref 26.6–33)
MCHC RBC AUTO-ENTMCNC: 34.4 G/DL (ref 31.5–35.7)
MCV RBC AUTO: 85 FL (ref 79–97)
MICRO URNS: NORMAL
MONOCYTES # BLD AUTO: 0.6 X10E3/UL (ref 0.1–0.9)
MONOCYTES NFR BLD AUTO: 7 %
N GONORRHOEA RRNA SPEC QL NAA+PROBE: NEGATIVE
NEUTROPHILS # BLD AUTO: 4.4 X10E3/UL (ref 1.4–7)
NEUTROPHILS NFR BLD AUTO: 56 %
NITRITE UR QL STRIP: NEGATIVE
PH UR STRIP: 6 [PH] (ref 5–7.5)
PLATELET # BLD AUTO: 329 X10E3/UL (ref 150–450)
POTASSIUM SERPL-SCNC: 4 MMOL/L (ref 3.5–5.2)
PROT SERPL-MCNC: 7 G/DL (ref 6–8.5)
PROT UR QL STRIP: NEGATIVE
RBC # BLD AUTO: 5.48 X10E6/UL (ref 4.14–5.8)
RPR SER QL: NON REACTIVE
SODIUM SERPL-SCNC: 141 MMOL/L (ref 134–144)
SP GR UR: 1.01 (ref 1–1.03)
TESTOST FREE SERPL-MCNC: 14.2 PG/ML (ref 6.8–21.5)
TESTOST SERPL-MCNC: 641 NG/DL (ref 264–916)
TRIGL SERPL-MCNC: 109 MG/DL (ref 0–149)
TSH SERPL DL<=0.005 MIU/L-ACNC: 3.31 UIU/ML (ref 0.45–4.5)
UROBILINOGEN UR STRIP-MCNC: 0.2 MG/DL (ref 0.2–1)
VIT B12 SERPL-MCNC: 1260 PG/ML (ref 232–1245)
VLDLC SERPL CALC-MCNC: 22 MG/DL (ref 5–40)
WBC # BLD AUTO: 7.9 X10E3/UL (ref 3.4–10.8)

## 2019-08-20 NOTE — PROGRESS NOTES
Results reviewed with the patient in the office today:    Sexually Transmitted Disease Test Results: Your Syphillis test is negative. Your HIV test is negative. Your Gonorrhea test is negative. Your Chlamydia test is negative. Your Hepatitis A, B, and C tests are all negative. Your Herpes 1 test is positive for past exposure, negative for current infection. Your Herpes 2 test is negative. Urine Test:  Your urine analysis is normal.    Cholesterol Panel: Total Cholesterol is 186 (goal <200). Triglycerides are 109. (goal <150)  Your HDL or \"good\" cholesterol is 48. (goal >40). Your LDL or \"bad\" cholesterol is 116.  (goal <100). CMP:  Fasting blood sugar is normal at 95, your hemoglobin A1C is 5.2. You do not have diabetes. Kidney function is normal.   Your electrolytes are normal.   Your liver function is normal.     Thyroid:  Your thyroid function is normal.    Vitamin D is normal. Try to get 10-15 minutes of sunlight daily without burning and try to eat foods such as low fat dairy which is Vitamin D fortified. CBC:  Your red blood cell count is normal.   Your white blood cell count is normal.   Your platelet count is normal.   You are not anemic and your hemoglobin is 16.0. Your Vitamin B12 level is normal.    Your testosterone level is normal.    Divya Gutierrez, MSN, MHA, FNP-BC

## 2019-08-20 NOTE — PROGRESS NOTES
Chief Complaint   Patient presents with    Follow-up     Room 5     HPI:  The patient is a 44 y.o. male who presents today for a follow up appointment. No hospital, ER or specialist visits since last primary care visit except as noted below. ADD:  Wants to stop taking amphetamines at this time. He has started working with his EAP, wants to start outpatient therapy at [facility]. He plans to start the program tomorrow. Needs a work note and will need [facility] paperwork completed once available. Labs:    Results reviewed with the patient in the office today:    Sexually Transmitted Disease Test Results: Your Syphillis test is negative. Your HIV test is negative. Your Gonorrhea test is negative. Your Chlamydia test is negative. Your Hepatitis A, B, and C tests are all negative. Your Herpes 1 test is positive for past exposure, negative for current infection. Your Herpes 2 test is negative. Urine Test:  Your urine analysis is normal.    Cholesterol Panel: Total Cholesterol is 186 (goal <200). Triglycerides are 109. (goal <150)  Your HDL or \"good\" cholesterol is 48. (goal >40). Your LDL or \"bad\" cholesterol is 116.  (goal <100). CMP:  Fasting blood sugar is normal at 95, your hemoglobin A1C is 5.2. You do not have diabetes. Kidney function is normal.   Your electrolytes are normal.   Your liver function is normal.     Thyroid:  Your thyroid function is normal.    Vitamin D is normal. Try to get 10-15 minutes of sunlight daily without burning and try to eat foods such as low fat dairy which is Vitamin D fortified. CBC:  Your red blood cell count is normal.   Your white blood cell count is normal.   Your platelet count is normal.   You are not anemic and your hemoglobin is 16.0.   Your Vitamin B12 level is normal.    Your testosterone level is normal.    Orders Only on 08/14/2019   Component Date Value Ref Range Status    Specific Gravity 08/16/2019 1.014  1.005 - 1.030 Final    pH (UA) 08/16/2019 6.0  5.0 - 7.5 Final    Color 08/16/2019 Yellow  Yellow Final    Appearance 08/16/2019 Clear  Clear Final    Leukocyte Esterase 08/16/2019 Negative  Negative Final    Protein 08/16/2019 Negative  Negative/Trace Final    Glucose 08/16/2019 Negative  Negative Final    Ketone 08/16/2019 Negative  Negative Final    Blood 08/16/2019 Negative  Negative Final    Bilirubin 08/16/2019 Negative  Negative Final    Urobilinogen 08/16/2019 0.2  0.2 - 1.0 mg/dL Final    Nitrites 08/16/2019 Negative  Negative Final    Microscopic Examination 08/16/2019 Comment   Final    Microscopic not indicated and not performed.  Cholesterol, total 08/16/2019 186  100 - 199 mg/dL Final    Triglyceride 08/16/2019 109  0 - 149 mg/dL Final    HDL Cholesterol 08/16/2019 48  >39 mg/dL Final    VLDL, calculated 08/16/2019 22  5 - 40 mg/dL Final    LDL, calculated 08/16/2019 116* 0 - 99 mg/dL Final    Glucose 08/16/2019 95  65 - 99 mg/dL Final    BUN 08/16/2019 10  6 - 24 mg/dL Final    Creatinine 08/16/2019 0.98  0.76 - 1.27 mg/dL Final    GFR est non-AA 08/16/2019 95  >59 mL/min/1.73 Final    GFR est AA 08/16/2019 109  >59 mL/min/1.73 Final    BUN/Creatinine ratio 08/16/2019 10  9 - 20 Final    Sodium 08/16/2019 141  134 - 144 mmol/L Final    Potassium 08/16/2019 4.0  3.5 - 5.2 mmol/L Final    Chloride 08/16/2019 103  96 - 106 mmol/L Final    CO2 08/16/2019 20  20 - 29 mmol/L Final    Calcium 08/16/2019 9.7  8.7 - 10.2 mg/dL Final    Protein, total 08/16/2019 7.0  6.0 - 8.5 g/dL Final    Albumin 08/16/2019 4.7  3.5 - 5.5 g/dL Final    GLOBULIN, TOTAL 08/16/2019 2.3  1.5 - 4.5 g/dL Final    A-G Ratio 08/16/2019 2.0  1.2 - 2.2 Final    Bilirubin, total 08/16/2019 0.3  0.0 - 1.2 mg/dL Final    Alk.  phosphatase 08/16/2019 84  39 - 117 IU/L Final    AST (SGOT) 08/16/2019 20  0 - 40 IU/L Final    ALT (SGPT) 08/16/2019 48* 0 - 44 IU/L Final    TSH 08/16/2019 3.310  0.450 - 4.500 uIU/mL Final    VITAMIN D, 25-HYDROXY 08/16/2019 34.1  30.0 - 100.0 ng/mL Final    Comment: Vitamin D deficiency has been defined by the [facility] practice guideline as a  level of serum 25-OH vitamin D less than 20 ng/mL (1,2). The Endocrine Society went on to further define vitamin D  insufficiency as a level between 21 and 29 ng/mL (2). 1. IOM ([place] [employer]). 2010. Dietary reference     intakes for calcium and D. [facility]: The     [facility]. 2. Spenser MF, Bella MONTERO, Charley CONNORS, et al.     Evaluation, treatment, and prevention of vitamin D     deficiency: an Endocrine Society clinical practice     guideline. JCEM. 2011 Jul; 96(6):1911-30.  Hemoglobin A1c 08/16/2019 5.2  4.8 - 5.6 % Final    Comment:          Prediabetes: 5.7 - 6.4           Diabetes: >6.4           Glycemic control for adults with diabetes: <7.0      Estimated average glucose 08/16/2019 103  mg/dL Final    WBC 08/16/2019 7.9  3.4 - 10.8 x10E3/uL Final    RBC 08/16/2019 5.48  4.14 - 5.80 x10E6/uL Final    HGB 08/16/2019 16.0  13.0 - 17.7 g/dL Final    HCT 08/16/2019 46.5  37.5 - 51.0 % Final    MCV 08/16/2019 85  79 - 97 fL Final    MCH 08/16/2019 29.2  26.6 - 33.0 pg Final    MCHC 08/16/2019 34.4  31.5 - 35.7 g/dL Final    RDW 08/16/2019 12.6  12.3 - 15.4 % Final    PLATELET 49/51/1504 187  150 - 450 x10E3/uL Final    NEUTROPHILS 08/16/2019 56  Not Estab. % Final    Lymphocytes 08/16/2019 35  Not Estab. % Final    MONOCYTES 08/16/2019 7  Not Estab. % Final    EOSINOPHILS 08/16/2019 1  Not Estab. % Final    BASOPHILS 08/16/2019 1  Not Estab. % Final    ABS. NEUTROPHILS 08/16/2019 4.4  1.4 - 7.0 x10E3/uL Final    Abs Lymphocytes 08/16/2019 2.7  0.7 - 3.1 x10E3/uL Final    ABS. MONOCYTES 08/16/2019 0.6  0.1 - 0.9 x10E3/uL Final    ABS. EOSINOPHILS 08/16/2019 0.1  0.0 - 0.4 x10E3/uL Final    ABS.  BASOPHILS 08/16/2019 0.1  0.0 - 0.2 x10E3/uL Final    IMMATURE GRANULOCYTES 08/16/2019 0  Not Estab. % Final    ABS. IMM. GRANS. 08/16/2019 0.0  0.0 - 0.1 x10E3/uL Final    Testosterone 08/16/2019 641  264 - 916 ng/dL Final    Comment: Adult male reference interval is based on a population of  healthy nonobese males (BMI <30) between 23and 44years old. [address] [place] [address], [address] [place] [address] Road [phone]. PMID: 92651583.  Free testosterone (Direct) 08/16/2019 14.2  6.8 - 21.5 pg/mL Final    RPR 08/16/2019 Non Reactive  Non Reactive Final    Hepatitis A Ab, IgM 08/16/2019 Negative  Negative Final    Hep B surface Ag screen 08/16/2019 Negative  Negative Final    Hep B Core Ab, IgM 08/16/2019 Negative  Negative Final    Hep C Virus Ab 08/16/2019 <0.1  0.0 - 0.9 s/co ratio Final    Comment:                                   Negative:     < 0.8                               Indeterminate: 0.8 - 0.9                                    Positive:     > 0.9   The CDC recommends that a positive HCV antibody result   be followed up with a HCV Nucleic Acid Amplification   test (752612).  Chlamydia trachomatis, THOMAS 08/16/2019 Negative  Negative Final    Neisseria gonorrhoeae, THOMAS 08/16/2019 Negative  Negative Final    HIV SCREEN 4TH GENERATION WRFX 08/16/2019 Non Reactive  Non Reactive Final    HSV 1 Ab, IgG, type spec. 08/16/2019 13.80* 0.00 - 0.90 index Final    Comment:                                  Negative        <0.91                                   Equivocal 0.91 - 1.09                                   Positive        >1.09   Note: Negative indicates no antibodies detected to   HSV-1. Equivocal may suggest early infection. If   clinically appropriate, retest at later date.  Positive   indicates antibodies detected to HSV-1.      HSV 2 Ab IgG, type spec. 08/16/2019 <0.91  0.00 - 0.90 index Final    Comment:                                  Negative        <0.91                                   Equivocal 0.91 - 1.09                                   Positive        >1.09   Note: Negative indicates no antibodies detected to   HSV-2. Equivocal may suggest early infection. If   clinically appropriate, retest at later date. Positive   indicates antibodies detected to HSV-2.      HSV 1 IgM Antibodies 08/16/2019 <1:10  <1:10 titer Final    HSV 2 IgM Antibodies 08/16/2019 <1:10  <1:10 titer Final    Comment: HSV 1 and HSV 2 share many cross-reacting antigens. Elevated titers  to both HSV 1 and HSV 2 may represent crossreactive HSV antibodies  rather than exposure to both HSV 1 and HSV 2. Results for this test are for research purposes only by the assay's  . The performance characteristics of this product have  not been established. Results should not be used as a diagnostic  procedure without confirmation of the diagnosis by another medically  established diagnostic product or procedure.  Vitamin B12 08/16/2019 1,260* 232 - 1,245 pg/mL Final    INTERPRETATION 08/16/2019 Note   Final    Supplemental report is available. Review of Systems  A comprehensive review of systems was negative except for that written in the HPI. Patient Active Problem List   Diagnosis Code    Adjustment reaction with anxiety and depression F43.23    Vitamin D deficiency E55.9    Hypercholesterolemia E78.00    GERD (gastroesophageal reflux disease) K21.9    ADD (attention deficit disorder) without hyperactivity F98.8    Moderate major depression ([address] F32.1    Vitamin B12 deficiency E53.8    Hypothyroidism E03.9    Thyroid disease E07.9    Other chronic pain G89.29    Erectile dysfunction following radiation therapy N52.35    Acute pain of left knee M25.562    Injury of left knee S89. 92XA    Instability of left knee joint M25.362       Past Medical History:   Diagnosis Date    ADD (attention deficit disorder) without hyperactivity 6/7/2018    Adjustment reaction with anxiety and depression     Attention deficit hyperactivity disorder (ADHD), predominantly inattentive type 2018    Cancer ([facility]     testicular     Cancer ([facility] 2010    Testicular    Community acquired pneumonia     Depression     GERD (gastroesophageal reflux disease)     Headache     Hypercholesterolemia     Hypertension     Thyroid disease 2017    Vitamin D deficiency        Past Surgical History:   Procedure Laterality Date    HX ORCHIECTOMY Right 2010    HX UROLOGICAL      removed on e testicle    HX WISDOM TEETH EXTRACTION         Social History     Tobacco Use    Smoking status: Former Smoker     Packs/day: 1.00     Years: 8.00     Pack years: 8.00     Types: Cigarettes     Last attempt to quit:      Years since quittin.6    Smokeless tobacco: Never Used   Substance Use Topics    Alcohol use: Yes     Alcohol/week: 10.0 standard drinks     Types: 24 Cans of beer per week     Frequency: Monthly or less     Drinks per session: 1 or 2     Binge frequency: Less than monthly    Drug use: No       Family History   Problem Relation Age of Onset    Cancer Mother         Ovarian    Hypertension Mother     Cancer Father         Throat    Hypertension Father     Arrhythmia Brother         Afib    No Known Problems Maternal Grandmother     No Known Problems Maternal Grandfather     No Known Problems Paternal Grandmother     No Known Problems Paternal Grandfather        Outpatient Medications Marked as Taking for the 19 encounter (Office Visit) with Jamar Brown NP   Medication Sig Dispense Refill    dextroamphetamine-amphetamine (ADDERALL) 30 mg tablet TAKE 1 TABLET BY MOUTH TWICE A DAY -MAX DAILY AMOUNT 2 TABLET  0    tadalafil (CIALIS) 5 mg tablet Take 1 Tab by mouth daily as needed for Other (ED). 30 Tab 2    naproxen (NAPROSYN) 500 mg tablet Take 1 Tab by mouth two (2) times daily (with meals). 60 Tab 1    dextroamphetamine-amphetamine (ADDERALL) 20 mg tablet Take 1 Tab by mouth two (2) times a day for 30 days.  Max Daily Amount: 40 mg. 60 Tab 0    levothyroxine (SYNTHROID) 50 mcg tablet Take 1 Tab by mouth Daily (before breakfast). 90 Tab 1    atorvastatin (LIPITOR) 20 mg tablet Take 1 Tab by mouth daily. 90 Tab 1    buPROPion XL (WELLBUTRIN XL) 300 mg XL tablet Take 1 Tab by mouth every morning. 90 Tab 1    multivitamin (ONE A DAY) tablet Take 1 Tab by mouth daily. Current Outpatient Medications on File Prior to Visit   Medication Sig Dispense Refill    dextroamphetamine-amphetamine (ADDERALL) 30 mg tablet TAKE 1 TABLET BY MOUTH TWICE A DAY -MAX DAILY AMOUNT 2 TABLET  0    tadalafil (CIALIS) 5 mg tablet Take 1 Tab by mouth daily as needed for Other (ED). 30 Tab 2    naproxen (NAPROSYN) 500 mg tablet Take 1 Tab by mouth two (2) times daily (with meals). 60 Tab 1    dextroamphetamine-amphetamine (ADDERALL) 20 mg tablet Take 1 Tab by mouth two (2) times a day for 30 days. Max Daily Amount: 40 mg. 60 Tab 0    levothyroxine (SYNTHROID) 50 mcg tablet Take 1 Tab by mouth Daily (before breakfast). 90 Tab 1    atorvastatin (LIPITOR) 20 mg tablet Take 1 Tab by mouth daily. 90 Tab 1    buPROPion XL (WELLBUTRIN XL) 300 mg XL tablet Take 1 Tab by mouth every morning. 90 Tab 1    multivitamin (ONE A DAY) tablet Take 1 Tab by mouth daily.  albuterol (PROVENTIL HFA, VENTOLIN HFA, PROAIR HFA) 90 mcg/actuation inhaler Take 2 Puffs by inhalation every four (4) hours as needed for Wheezing. 1 Inhaler 2     No current facility-administered medications on file prior to visit.         No Known Allergies    PE:  Visit Vitals  /88 (BP 1 Location: Right arm, BP Patient Position: Sitting)   Pulse 77   Temp 98.2 °F (36.8 °C) (Oral)   Resp 19   Ht 6' 3\" (1.905 m)   Wt 246 lb (111.6 kg)   SpO2 98%   BMI 30.75 kg/m²       Gen: alert, oriented, no acute distress  Head: normocephalic, atraumatic  Ears: external auditory canals clear, TMs without erythema or effusion  Eyes: pupils equal round reactive to light, sclera clear, conjunctiva clear  Oral: moist mucus membranes, no oral lesions, no pharyngeal inflammation or exudate  Neck: symmetric normal sized thyroid, no carotid bruits, no jugular vein distention  Resp: no increase work of breathing, lungs clear to ausculation bilaterally, no wheezing, rales or rhonchi  CV: S1, S2 normal.  No murmurs, rubs, or gallops. Abd: soft, not tender, not distended. No hepatosplenomegaly. Normal bowel sounds. No hernias. No abdominal or renal bruits. Neuro: cranial nerves intact, normal strength and movement in all extremities, reflexes and sensation intact and symmetric. Skin: no lesion or rash  Extremities: no cyanosis or edema    No results found for this visit on 08/20/19. Assessment/Plan:    ICD-10-CM ICD-9-CM    1. Moderate major depression (HCC) F32.1 296.22    2. ADD (attention deficit disorder) without hyperactivity F98.8 314.00    3. Injury of left knee, subsequent encounter S89. 92XD V58.89      959.7      Diagnoses and all orders for this visit:    1. Moderate major depression ([address]    2. ADD (attention deficit disorder) without hyperactivity    3. Injury of left knee, subsequent encounter      Follow-up and Dispositions    · Return in about 2 weeks (around 9/4/2019) for Medication Check, Paperwork F/U.       reviewed diet, exercise and weight control  reviewed medications and side effects in detail    call if any problems    Health Maintenance reviewed - deferred. Recommended healthy diet low in carbohydrates, fats, sodium and cholesterol. Recommended regular cardiovascular exercise 3-6 times per week for 30-60 minutes daily. Chart is reviewed and updated today in the office. Records requested for other providers patient has seen and is currently seeing. Patient was offered a choice/choices in the treatment plan today. Patient expresses understanding of the plan and agrees with recommendations. Verbal and written instructions (see AVS) provided. See patient instructions for more.  Patient expresses understanding of diagnosis and treatment plan.

## 2019-08-20 NOTE — PROGRESS NOTES
Kizzy Dawkins  Identified pt with two pt identifiers(name and ). Chief Complaint   Patient presents with    Follow-up     Room 5       1. Have you been to the ER, urgent care clinic since your last visit?n   Hospitalized since your last visit? n     2. Have you seen or consulted any other health care providers outside of the [address] [place] [address] since your last visit? Include any pap smears or colon screening. n       Advance Care Planning    In the event something were to happen to you and you were unable to speak on your behalf, do you have an Advance Directive/ Living Will in place stating your wishes? NO    If yes, do we have a copy on file NO    If no, would you like information NO    Medication reconciliation up to date and corrected with patient at this time. Today's provider has been notified of reason for visit, vitals and flowsheets obtained on patients. Reviewed record in preparation for visit, huddled with provider and have obtained necessary documentation.       Health Maintenance Due   Topic    Influenza Age 5 to Adult        Wt Readings from Last 3 Encounters:   19 246 lb (111.6 kg)   19 239 lb (108.4 kg)   19 246 lb 8 oz (111.8 kg)     Temp Readings from Last 3 Encounters:   19 98.2 °F (36.8 °C) (Oral)   19 97.5 °F (36.4 °C) (Oral)   19 97.7 °F (36.5 °C) (Oral)     BP Readings from Last 3 Encounters:   19 131/88   19 139/89   19 140/90     Pulse Readings from Last 3 Encounters:   19 77   19 83   19 69     Vitals:    19 1601   BP: 131/88   Pulse: 77   Resp: 19   Temp: 98.2 °F (36.8 °C)   TempSrc: Oral   SpO2: 98%   Weight: 246 lb (111.6 kg)   Height: 6' 3\" (1.905 m)   PainSc:   0 - No pain         Learning Assessment:  :     Learning Assessment 2015   PRIMARY LEARNER Patient Patient   HIGHEST LEVEL OF EDUCATION - PRIMARY LEARNER  GRADUATED HIGH SCHOOL OR GED -   BARRIERS PRIMARY LEARNER NONE -   CO-LEARNER CAREGIVER No -   PRIMARY LANGUAGE ENGLISH ENGLISH   LEARNER PREFERENCE PRIMARY READING OTHER (COMMENT)   ANSWERED BY patient pt   RELATIONSHIP SELF SELF       Depression Screening:  :     3 most recent PHQ Screens 2/5/2019   Little interest or pleasure in doing things Not at all   Feeling down, depressed, irritable, or hopeless Not at all   Total Score PHQ 2 0   Trouble falling or staying asleep, or sleeping too much -   Feeling tired or having little energy -   Poor appetite, weight loss, or overeating -   Feeling bad about yourself - or that you are a failure or have let yourself or your family down -   Trouble concentrating on things such as school, work, reading, or watching TV -   Moving or speaking so slowly that other people could have noticed; or the opposite being so fidgety that others notice -   Thoughts of being better off dead, or hurting yourself in some way -   PHQ 9 Score -   How difficult have these problems made it for you to do your work, take care of your home and get along with others -       No flowsheet data found. Fall Risk Assessment:  :     No flowsheet data found. Abuse Screening:  :     Abuse Screening Questionnaire 8/24/2018   Do you ever feel afraid of your partner? N   Are you in a relationship with someone who physically or mentally threatens you? N   Is it safe for you to go home?  Y       ADL Screening:  :     ADL Assessment 2/5/2019   Feeding yourself No Help Needed   Getting from bed to chair No Help Needed   Getting dressed No Help Needed   Bathing or showering No Help Needed   Walk across the room (includes cane/walker) No Help Needed   Using the telphone No Help Needed   Taking your medications No Help Needed   Preparing meals No Help Needed   Managing money (expenses/bills) No Help Needed   Moderately strenuous housework (laundry) No Help Needed   Shopping for personal items (toiletries/medicines) No Help Needed   Shopping for groceries No Help Needed Driving No Help Needed   Climbing a flight of stairs No Help Needed   Getting to places beyond walking distances No Help Needed           BMI:  Weight Metrics 8/20/2019 8/13/2019 8/2/2019 7/26/2019 6/25/2019 4/30/2019 2/5/2019   Weight 246 lb 239 lb 246 lb 8 oz 245 lb 9.6 oz 258 lb 14.4 oz 257 lb 11.2 oz 258 lb 4.8 oz   BMI 30.75 kg/m2 29.87 kg/m2 30.81 kg/m2 30.7 kg/m2 32.36 kg/m2 32.21 kg/m2 32.29 kg/m2           Medication reconciliation up to date and corrected with patient at this time.

## 2019-08-20 NOTE — PATIENT INSTRUCTIONS

## 2019-08-20 NOTE — LETTER
NOTIFICATION RETURN TO WORK / SCHOOL 
 
8/20/2019 4:20 PM 
 
Mr. Eden Coffman 
[facility] [address] [id number] To Whom It May Concern: 
 
Eden Coffman is currently under the care of [facility]. He is being treated by me for a chronic medical condition, please excuse him from work starting 8/15/2019 through 8/20/2019. If there are questions or concerns please have the patient contact our office. Sincerely, Nell Leyva NP

## 2019-08-22 LAB — DRUGS UR: NORMAL

## 2019-09-04 ENCOUNTER — OFFICE VISIT (OUTPATIENT)
Dept: FAMILY MEDICINE CLINIC | Age: 44
End: 2019-09-04

## 2019-09-04 VITALS
SYSTOLIC BLOOD PRESSURE: 143 MMHG | TEMPERATURE: 96.6 F | RESPIRATION RATE: 18 BRPM | HEART RATE: 113 BPM | WEIGHT: 246 LBS | OXYGEN SATURATION: 97 % | BODY MASS INDEX: 30.59 KG/M2 | HEIGHT: 75 IN | DIASTOLIC BLOOD PRESSURE: 89 MMHG

## 2019-09-04 DIAGNOSIS — M25.562 ACUTE PAIN OF LEFT KNEE: Primary | ICD-10-CM

## 2019-09-04 DIAGNOSIS — S89.92XD INJURY OF LEFT KNEE, SUBSEQUENT ENCOUNTER: ICD-10-CM

## 2019-09-04 DIAGNOSIS — G89.29 OTHER CHRONIC PAIN: ICD-10-CM

## 2019-09-04 DIAGNOSIS — K21.9 GASTROESOPHAGEAL REFLUX DISEASE, ESOPHAGITIS PRESENCE NOT SPECIFIED: ICD-10-CM

## 2019-09-04 DIAGNOSIS — M25.362 INSTABILITY OF LEFT KNEE JOINT: ICD-10-CM

## 2019-09-04 RX ORDER — NAPROXEN 500 MG/1
500 TABLET ORAL 2 TIMES DAILY WITH MEALS
Qty: 60 TAB | Refills: 1 | Status: SHIPPED | OUTPATIENT
Start: 2019-09-04 | End: 2020-02-26 | Stop reason: SDUPTHER

## 2019-09-04 RX ORDER — PANTOPRAZOLE SODIUM 40 MG/1
40 TABLET, DELAYED RELEASE ORAL DAILY
Qty: 90 TAB | Refills: 1 | Status: SHIPPED | OUTPATIENT
Start: 2019-09-04 | End: 2020-02-26 | Stop reason: SDUPTHER

## 2019-09-04 RX ORDER — ATOMOXETINE 25 MG/1
CAPSULE ORAL
Refills: 0 | COMMUNITY
Start: 2019-08-28 | End: 2019-09-18

## 2019-09-04 NOTE — PROGRESS NOTES
Chief Complaint   Patient presents with   [facility]     says he needs papeerwork, wont say what         HPI:  The patient is a 44 y.o. male who presents today for a follow up appointment. No hospital, ER or specialist visits since last primary care visit except as noted below. Knee Injury:   He has been on FMLA for knee injury from 7/20/2019. He was referred to Ortho/PT for further evaluation and treatment. He had FMLA approved through 8/14/2019. He needs a work note and FMLA extension from 8/15/2019 to RTW on Monday, 9/9/2019. He reports GERD with NSAIDs. Taking Zantac without relief. ADD:  Wants to stop taking amphetamines at this time. He has started working with his EAP, wants to start outpatient therapy at [facility]. He started the program on 8/21/2019. He was started on Strattera. He reports he has struggled without taking Adderall. He had another prescription and had it filled. He would like to return to work on Monday, 9/9/2019. Review of Systems  A comprehensive review of systems was negative except for that written in the HPI. Patient Active Problem List   Diagnosis Code    Adjustment reaction with anxiety and depression F43.23    Vitamin D deficiency E55.9    Hypercholesterolemia E78.00    Gastroesophageal reflux disease K21.9    ADD (attention deficit disorder) without hyperactivity F98.8    Moderate major depression ([facility] [address] F32.1    Vitamin B12 deficiency E53.8    Hypothyroidism E03.9    Thyroid disease E07.9    Other chronic pain G89.29    Erectile dysfunction following radiation therapy N52.35    Acute pain of left knee M25.562    Injury of left knee S89. 92XA    Instability of left knee joint M25.362       Past Medical History:   Diagnosis Date    ADD (attention deficit disorder) without hyperactivity 6/7/2018    Adjustment reaction with anxiety and depression     Attention deficit hyperactivity disorder (ADHD), predominantly inattentive type 6/7/2018    Cancer [facility])     testicular     Cancer [facility]) 2010    Testicular    Community acquired pneumonia     Depression     GERD (gastroesophageal reflux disease)     Headache     Hypercholesterolemia     Hypertension     Thyroid disease 2017    Vitamin D deficiency        Past Surgical History:   Procedure Laterality Date    HX ORCHIECTOMY Right 2010    HX UROLOGICAL      removed on e testicle    HX WISDOM TEETH EXTRACTION         Social History     Tobacco Use    Smoking status: Former Smoker     Packs/day: 1.00     Years: 8.00     Pack years: 8.00     Types: Cigarettes     Last attempt to quit:      Years since quittin.6    Smokeless tobacco: Never Used   Substance Use Topics    Alcohol use: Yes     Alcohol/week: 10.0 standard drinks     Types: 24 Cans of beer per week     Frequency: Monthly or less     Drinks per session: 1 or 2     Binge frequency: Less than monthly     Comment: weekly    Drug use: No       Family History   Problem Relation Age of Onset    Cancer Mother         Ovarian    Hypertension Mother     Cancer Father         Throat    Hypertension Father     Arrhythmia Brother         Afib    No Known Problems Maternal Grandmother     No Known Problems Maternal Grandfather     No Known Problems Paternal Grandmother     No Known Problems Paternal Grandfather            Current Outpatient Medications on File Prior to Visit   Medication Sig Dispense Refill    atomoxetine (STRATTERA) 25 mg capsule   0    tadalafil (CIALIS) 5 mg tablet Take 1 Tab by mouth daily as needed for Other (ED). 30 Tab 2    levothyroxine (SYNTHROID) 50 mcg tablet Take 1 Tab by mouth Daily (before breakfast). 90 Tab 1    atorvastatin (LIPITOR) 20 mg tablet Take 1 Tab by mouth daily. 90 Tab 1    buPROPion XL (WELLBUTRIN XL) 300 mg XL tablet Take 1 Tab by mouth every morning.  90 Tab 1    albuterol (PROVENTIL HFA, VENTOLIN HFA, PROAIR HFA) 90 mcg/actuation inhaler Take 2 Puffs by inhalation every four (4) hours as needed for Wheezing. 1 Inhaler 2    multivitamin (ONE A DAY) tablet Take 1 Tab by mouth daily. No current facility-administered medications on file prior to visit. No Known Allergies    PE:  Visit Vitals  /89   Pulse (!) 113   Temp 96.6 °F (35.9 °C) (Oral)   Resp 18   Ht 6' 3\" (1.905 m)   Wt 246 lb (111.6 kg)   SpO2 97%   BMI 30.75 kg/m²       Gen: alert, oriented, no acute distress  Head: normocephalic, atraumatic  Ears: external auditory canals clear, TMs without erythema or effusion  Eyes: pupils equal round reactive to light, sclera clear, conjunctiva clear  Oral: moist mucus membranes, no oral lesions, no pharyngeal inflammation or exudate  Neck: symmetric normal sized thyroid, no carotid bruits, no jugular vein distention  Resp: no increase work of breathing, lungs clear to ausculation bilaterally, no wheezing, rales or rhonchi  CV: S1, S2 normal.  No murmurs, rubs, or gallops. Abd: soft, not tender, not distended. No hepatosplenomegaly. Normal bowel sounds. No hernias. No abdominal or renal bruits. Neuro: cranial nerves intact, normal strength and movement in all extremities, reflexes and sensation intact and symmetric. Skin: no lesion or rash  Extremities: no cyanosis or edema    No results found for this visit on 09/04/19. Assessment/Plan:    ICD-10-CM ICD-9-CM    1. Acute pain of left knee M25.562 719.46 naproxen (NAPROSYN) 500 mg tablet   2. Injury of left knee, subsequent encounter S89. 92XD V58.89      959.7    3. Instability of left knee joint M25.362 718.86    4. Gastroesophageal reflux disease, esophagitis presence not specified K21.9 530.81 pantoprazole (PROTONIX) 40 mg tablet   5. Other chronic pain G89.29 338.29 naproxen (NAPROSYN) 500 mg tablet     Diagnoses and all orders for this visit:    1. Acute pain of left knee  -     naproxen (NAPROSYN) 500 mg tablet; Take 1 Tab by mouth two (2) times daily (with meals).     2. Injury of left knee, subsequent encounter    3. Instability of left knee joint    4. Gastroesophageal reflux disease, esophagitis presence not specified  -     pantoprazole (PROTONIX) 40 mg tablet; Take 1 Tab by mouth daily. 5. Other chronic pain  -     naproxen (NAPROSYN) 500 mg tablet; Take 1 Tab by mouth two (2) times daily (with meals). Follow-up and Dispositions    · Return in about 2 weeks (around 9/18/2019) for Medication Check, FMLA paperwork F/U.       lab results and schedule of future lab studies reviewed with patient  reviewed diet, exercise and weight control  reviewed medications and side effects in detail    FMLA paperwork completed in the office today. RTW 9/9/2019, extended FMLA from 8/15/2019 to 9/9/2019. Paperwork scanned to the patient's chart, original given to the patient. call if any problems    Health Maintenance reviewed - reviewed, patient will need flu vaccine at his next appointment in late 9/2019. Recommended healthy diet low in carbohydrates, fats, sodium and cholesterol. Recommended regular cardiovascular exercise 3-6 times per week for 30-60 minutes daily. Chart is reviewed and updated today in the office. Records requested for other providers patient has seen and is currently seeing. Patient was offered a choice/choices in the treatment plan today. Patient expresses understanding of the plan and agrees with recommendations. Verbal and written instructions (see AVS) provided. See patient instructions for more. Patient expresses understanding of diagnosis and treatment plan.

## 2019-09-04 NOTE — PATIENT INSTRUCTIONS
Gastroesophageal Reflux Disease (GERD): Care Instructions  Your Care Instructions    Gastroesophageal reflux disease (GERD) is the backward flow of stomach acid into the esophagus. The esophagus is the tube that leads from your throat to your stomach. A one-way valve prevents the stomach acid from moving up into this tube. When you have GERD, this valve does not close tightly enough. If you have mild GERD symptoms including heartburn, you may be able to control the problem with antacids or over-the-counter medicine. Changing your diet, losing weight, and making other lifestyle changes can also help reduce symptoms. Follow-up care is a key part of your treatment and safety. Be sure to make and go to all appointments, and call your doctor if you are having problems. It's also a good idea to know your test results and keep a list of the medicines you take. How can you care for yourself at home? · Take your medicines exactly as prescribed. Call your doctor if you think you are having a problem with your medicine. · Your doctor may recommend over-the-counter medicine. For mild or occasional indigestion, antacids, such as Tums, Gaviscon, Mylanta, or Maalox, may help. Your doctor also may recommend over-the-counter acid reducers, such as Pepcid AC, Tagamet HB, Zantac 75, or Prilosec. Read and follow all instructions on the label. If you use these medicines often, talk with your doctor. · Change your eating habits. ? It's best to eat several small meals instead of two or three large meals. ? After you eat, wait 2 to 3 hours before you lie down. ? Chocolate, mint, and alcohol can make GERD worse. ? Spicy foods, foods that have a lot of acid (like tomatoes and oranges), and coffee can make GERD symptoms worse in some people. If your symptoms are worse after you eat a certain food, you may want to stop eating that food to see if your symptoms get better.   · Do not smoke or chew tobacco. Smoking can make GERD worse. If you need help quitting, talk to your doctor about stop-smoking programs and medicines. These can increase your chances of quitting for good. · If you have GERD symptoms at night, raise the head of your bed 6 to 8 inches by putting the frame on blocks or placing a foam wedge under the head of your mattress. (Adding extra pillows does not work.)  · Do not wear tight clothing around your middle. · Lose weight if you need to. Losing just 5 to 10 pounds can help. When should you call for help? Call your doctor now or seek immediate medical care if:    · You have new or different belly pain.     · Your stools are black and tarlike or have streaks of blood.    Watch closely for changes in your health, and be sure to contact your doctor if:    · Your symptoms have not improved after 2 days.     · Food seems to catch in your throat or chest.   Where can you learn more? Go to http://charissa-maria elena.info/. Enter B586 in the search box to learn more about \"Gastroesophageal Reflux Disease (GERD): Care Instructions. \"  Current as of: November 7, 2018  Content Version: 12.1  © 4463-0076 [employer]. Care instructions adapted under license by [employer] (which disclaims liability or warranty for this information). If you have questions about a medical condition or this instruction, always ask your healthcare professional. [address] any warranty or liability for your use of this information. Knee Pain or Injury: Care Instructions  Your Care Instructions    Injuries are a common cause of knee problems. Sudden (acute) injuries may be caused by a direct blow to the knee. They can also be caused by abnormal twisting, bending, or falling on the knee. Pain, bruising, or swelling may be severe, and may start within minutes of the injury. Overuse is another cause of knee pain.  Other causes are climbing stairs, kneeling, and other activities that use the knee. Everyday wear and tear, especially as you get older, also can cause knee pain. Rest, along with home treatment, often relieves pain and allows your knee to heal. If you have a serious knee injury, you may need tests and treatment. Follow-up care is a key part of your treatment and safety. Be sure to make and go to all appointments, and call your doctor if you are having problems. It's also a good idea to know your test results and keep a list of the medicines you take. How can you care for yourself at home? · Be safe with medicines. Read and follow all instructions on the label. ? If the doctor gave you a prescription medicine for pain, take it as prescribed. ? If you are not taking a prescription pain medicine, ask your doctor if you can take an over-the-counter medicine. · Rest and protect your knee. Take a break from any activity that may cause pain. · Put ice or a cold pack on your knee for 10 to 20 minutes at a time. Put a thin cloth between the ice and your skin. · Prop up a sore knee on a pillow when you ice it or anytime you sit or lie down for the next 3 days. Try to keep it above the level of your heart. This will help reduce swelling. · If your knee is not swollen, you can put moist heat, a heating pad, or a warm cloth on your knee. · If your doctor recommends an elastic bandage, sleeve, or other type of support for your knee, wear it as directed. · Follow your doctor's instructions about how much weight you can put on your leg. Use a cane, crutches, or a walker as instructed. · Follow your doctor's instructions about activity during your healing process. If you can do mild exercise, slowly increase your activity. · Reach and stay at a healthy weight. Extra weight can strain the joints, especially the knees and hips, and make the pain worse. Losing even a few pounds may help. When should you call for help? Call 911 anytime you think you may need emergency care.  For example, call if:    · You have symptoms of a blood clot in your lung (called a pulmonary embolism). These may include:  ? Sudden chest pain. ? Trouble breathing. ? Coughing up blood.    Call your doctor now or seek immediate medical care if:    · You have severe or increasing pain.     · Your leg or foot turns cold or changes color.     · You cannot stand or put weight on your knee.     · Your knee looks twisted or bent out of shape.     · You cannot move your knee.     · You have signs of infection, such as:  ? Increased pain, swelling, warmth, or redness. ? Red streaks leading from the knee. ? Pus draining from a place on your knee. ? A fever.     · You have signs of a blood clot in your leg (called a deep vein thrombosis), such as:  ? Pain in your calf, back of the knee, thigh, or groin. ? Redness and swelling in your leg or groin.    Watch closely for changes in your health, and be sure to contact your doctor if:    · You have tingling, weakness, or numbness in your knee.     · You have any new symptoms, such as swelling.     · You have bruises from a knee injury that last longer than 2 weeks.     · You do not get better as expected. Where can you learn more? Go to http://charissa-maria elena.info/. Enter K195 in the search box to learn more about \"Knee Pain or Injury: Care Instructions. \"  Current as of: September 23, 2018  Content Version: 12.1  © 0692-9208 [employer]. Care instructions adapted under license by [employer] (which disclaims liability or warranty for this information). If you have questions about a medical condition or this instruction, always ask your healthcare professional. [place] [address] any warranty or liability for your use of this information.

## 2019-09-04 NOTE — PROGRESS NOTES
Chief Complaint   Patient presents with   [facility]     says he needs papeethomas nash say what       1. Have you been to the ER, urgent care clinic since your last visit? Hospitalized since your last visit? no    2. Have you seen or consulted any other health care providers outside of the [address] [place] [address] since your last visit? Include any pap smears or colon screening.   no

## 2019-09-18 ENCOUNTER — OFFICE VISIT (OUTPATIENT)
Dept: FAMILY MEDICINE CLINIC | Age: 44
End: 2019-09-18

## 2019-09-18 VITALS
RESPIRATION RATE: 19 BRPM | SYSTOLIC BLOOD PRESSURE: 127 MMHG | HEART RATE: 69 BPM | DIASTOLIC BLOOD PRESSURE: 89 MMHG | TEMPERATURE: 97.9 F | WEIGHT: 252.3 LBS | OXYGEN SATURATION: 99 % | HEIGHT: 75 IN | BODY MASS INDEX: 31.37 KG/M2

## 2019-09-18 DIAGNOSIS — G89.29 OTHER CHRONIC PAIN: ICD-10-CM

## 2019-09-18 DIAGNOSIS — Z23 ENCOUNTER FOR IMMUNIZATION: ICD-10-CM

## 2019-09-18 DIAGNOSIS — F43.23 ADJUSTMENT REACTION WITH ANXIETY AND DEPRESSION: ICD-10-CM

## 2019-09-18 DIAGNOSIS — F98.8 ADD (ATTENTION DEFICIT DISORDER) WITHOUT HYPERACTIVITY: Primary | ICD-10-CM

## 2019-09-18 RX ORDER — DEXTROAMPHETAMINE SACCHARATE, AMPHETAMINE ASPARTATE, DEXTROAMPHETAMINE SULFATE AND AMPHETAMINE SULFATE 7.5; 7.5; 7.5; 7.5 MG/1; MG/1; MG/1; MG/1
TABLET ORAL
Refills: 0 | COMMUNITY
Start: 2019-09-03 | End: 2019-09-18 | Stop reason: SDUPTHER

## 2019-09-18 RX ORDER — DEXTROAMPHETAMINE SACCHARATE, AMPHETAMINE ASPARTATE, DEXTROAMPHETAMINE SULFATE AND AMPHETAMINE SULFATE 5; 5; 5; 5 MG/1; MG/1; MG/1; MG/1
TABLET ORAL
Qty: 60 TAB | Refills: 0 | Status: SHIPPED | OUTPATIENT
Start: 2019-09-18 | End: 2019-10-18 | Stop reason: SDUPTHER

## 2019-09-18 RX ORDER — HYDROCODONE BITARTRATE AND ACETAMINOPHEN 10; 325 MG/1; MG/1
1 TABLET ORAL
Qty: 90 TAB | Refills: 0 | Status: SHIPPED | OUTPATIENT
Start: 2019-09-18 | End: 2019-10-18 | Stop reason: ALTCHOICE

## 2019-09-18 NOTE — PATIENT INSTRUCTIONS
Learning About Managing Chronic Pain  What is a plan for pain management? A pain management plan helps you find ways to control pain with side effects you can live with. Some diseases and injuries can cause pain that lasts a long time. Constant pain can make you depressed. It can cause stress and make it hard for you to eat and sleep. But you don't need to live with uncontrolled pain. How can you plan for managing your pain? You and your doctor will work to make your plan. Your plan can include more than one type of pain control. You may take prescription or over-the-counter drugs. You can also try physical treatments, like massage and acupuncture. Other things can help too, such as meditation or a type of therapy to change how you think about your pain. It's important to let your doctor know how you prefer to control your pain. Sometimes the goal of a pain management plan isn't to totally get rid of pain. Instead, it might be to reduce the pain enough that daily activities are easier. If your pain isn't controlled well enough, talk with your doctor. You may need to make a new plan. Or your doctor may refer you to a specialist.  What medicines are used? Your doctor may prescribe medicine to help with your pain. If you aren't taking a prescription medicine, you may be able to take an over-the-counter one. Here are the main types of medicine for chronic pain. · Non-opioids. These are things like acetaminophen, such as Tylenol, and non-steroidal anti-inflammatory drugs (NSAIDs), such as Advil. · Opioids. Morphine, codeine, and oxycodone are some examples. · Other medicines. Antidepressants and anti-seizure medicines may be used. These medicines seem to change the way your brain senses pain. Another option may be a nerve block injection. Medicines are the most common treatment for pain. But to feel better, you'll need to do more than take medicine.  You can also do things like reducing your stress level and changing how you think. How can you take medicine safely? Medicines can help you get better. But they can also be dangerous, especially if you don't take them the right way. Be safe with medicines. Read and follow all instructions on the label. If the medicine you take causes side effects such as constipation or nausea, you may need to take other medicines for those problems. Talk to your doctor about any side effects you have. If you were prescribed an opioid pain reliever, your care team will give you information on how to use it safely. You will also get directions for how to safely store the medicine and how to get rid of any that's left over. Follow these instructions carefully. What physical treatments can help? Physical treatments can be an important part of managing chronic pain. You may find that combining more than one treatment helps the most.  These treatments can include:  · Heat or cold. This can help arthritis, sore muscles, and other aches. · Hydrotherapy. It uses flowing water to relax muscles. · Massage. Massage involves rubbing the soft tissues of the body. It eases tension and pain. · Transcutaneous electrical nerve stimulation (TENS). This treatment uses a gentle electric current applied to the skin for pain relief. · Acupuncture. This is a form of traditional [facility] medicine. It uses very thin needles inserted into certain points of the body. · Physical therapy. This treatment uses stretches and exercises to reduce pain and help you move better. If you get physical therapy, make sure to do any home exercises or stretching your therapist has prescribed. Stay as active as you can. Try to get some physical activity every day. What other things can help? You can manage chronic pain by using things other than medicines or physical treatments. For example, you can keep track of your pain in a pain diary. It can help you understand how the things you do affect your pain.   Reducing stress and tension can reduce pain. And being more aware of your thought patterns can be helpful. In some cases, shifting how you think about pain can affect how you feel. Here are some options to think about:  · Breathing exercises and meditation. These techniques can help you focus your attention, relax, and get rid of tension. · Guided imagery. This is a series of thoughts and images that can focus your attention away from your pain. · Hypnosis. It's a state of focused concentration that makes you less aware of your surroundings. · Cognitive behavioral therapy. This type of counseling helps you change your thought patterns. · Yoga. Stretching and exercises can reduce stress and improve flexibility. If what you're doing to control your pain isn't working, or if you're feeling depressed, talk to your doctor. He or she can help you change your pain management plan and find resources for emotional support. Where can you learn more? Go to http://charissaVivoxidmaria elena.info/. Enter P119 in the search box to learn more about \"Learning About Managing Chronic Pain. \"  Current as of: March 28, 2019  Content Version: 12.1  © 2025-5498 [employer]. Care instructions adapted under license by [employer] (which disclaims liability or warranty for this information). If you have questions about a medical condition or this instruction, always ask your healthcare professional. [place] [address] any warranty or liability for your use of this information. Joint Pain: Care Instructions  Your Care Instructions    Many people have small aches and pains from overuse or injury to muscles and joints. Joint injuries often happen during sports or recreation, work tasks, or projects around the home.  An overuse injury can happen when you put too much stress on a joint or when you do an activity that stresses the joint over and over, such as using the computer or rowing a boat.  You can take action at home to help your muscles and joints get better. You should feel better in 1 to 2 weeks, but it can take 3 months or more to heal completely. Follow-up care is a key part of your treatment and safety. Be sure to make and go to all appointments, and call your doctor if you are having problems. It's also a good idea to know your test results and keep a list of the medicines you take. How can you care for yourself at home? · Do not put weight on the injured joint for at least a day or two. · For the first day or two after an injury, do not take hot showers or baths, and do not use hot packs. The heat could make swelling worse. · Put ice or a cold pack on the sore joint for 10 to 20 minutes at a time. Try to do this every 1 to 2 hours for the next 3 days (when you are awake) or until the swelling goes down. Put a thin cloth between the ice and your skin. · Wrap the injury in an elastic bandage. Do not wrap it too tightly because this can cause more swelling. · Prop up the sore joint on a pillow when you ice it or anytime you sit or lie down during the next 3 days. Try to keep it above the level of your heart. This will help reduce swelling. · Take an over-the-counter pain medicine, such as acetaminophen (Tylenol), ibuprofen (Advil, Motrin), or naproxen (Aleve). Read and follow all instructions on the label. · After 1 or 2 days of rest, begin moving the joint gently. While the joint is still healing, you can begin to exercise using activities that do not strain or hurt the painful joint. When should you call for help? Call your doctor now or seek immediate medical care if:    · You have signs of infection, such as:  ? Increased pain, swelling, warmth, and redness. ? Red streaks leading from the joint.   ? A fever.    Watch closely for changes in your health, and be sure to contact your doctor if:    · Your movement or symptoms are not getting better after 1 to 2 weeks of home treatment. Where can you learn more? Go to http://charissa-maria elena.info/. Enter P205 in the search box to learn more about \"Joint Pain: Care Instructions. \"  Current as of: September 20, 2018  Content Version: 12.1  © 3242-8333 [employer], [employer]. Care instructions adapted under license by [employer] (which disclaims liability or warranty for this information). If you have questions about a medical condition or this instruction, always ask your healthcare professional. [address] any warranty or liability for your use of this information.

## 2019-09-18 NOTE — PROGRESS NOTES
Crystal Willson  Identified pt with two pt identifiers(name and ). Chief Complaint   Patient presents with    Follow-up     Room 5       1. Have you been to the ER, urgent care clinic since your last visit?n   Hospitalized since your last visit? n     2. Have you seen or consulted any other health care providers outside of the [address] [place] [address] since your last visit? Include any pap smears or colon screening. n       Advance Care Planning    In the event something were to happen to you and you were unable to speak on your behalf, do you have an Advance Directive/ Living Will in place stating your wishes? NO    If yes, do we have a copy on file NO    If no, would you like information NO    Medication reconciliation up to date and corrected with patient at this time. Today's provider has been notified of reason for visit, vitals and flowsheets obtained on patients. Reviewed record in preparation for visit, huddled with provider and have obtained necessary documentation.       Health Maintenance Due   Topic    Influenza Age 5 to Adult        Wt Readings from Last 3 Encounters:   19 252 lb 4.8 oz (114.4 kg)   19 246 lb (111.6 kg)   19 246 lb (111.6 kg)     Temp Readings from Last 3 Encounters:   19 97.9 °F (36.6 °C) (Oral)   19 96.6 °F (35.9 °C) (Oral)   19 98.2 °F (36.8 °C) (Oral)     BP Readings from Last 3 Encounters:   19 127/89   19 143/89   19 131/88     Pulse Readings from Last 3 Encounters:   19 69   19 (!) 113   19 77     Vitals:    19 1608   BP: 127/89   Pulse: 69   Resp: 19   Temp: 97.9 °F (36.6 °C)   TempSrc: Oral   SpO2: 99%   Weight: 252 lb 4.8 oz (114.4 kg)   Height: 6' 3\" (1.905 m)   PainSc:   0 - No pain         Learning Assessment:  :     Learning Assessment 2015   PRIMARY LEARNER Patient Patient   HIGHEST LEVEL OF EDUCATION - PRIMARY LEARNER  GRADUATED HIGH SCHOOL OR GED -   Jason Arizmendi PRIMARY LEARNER NONE -   CO-LEARNER CAREGIVER No -   PRIMARY LANGUAGE ENGLISH ENGLISH   LEARNER PREFERENCE PRIMARY READING OTHER (COMMENT)   ANSWERED BY patient pt   RELATIONSHIP SELF SELF       Depression Screening:  :     3 most recent PHQ Screens 2/5/2019   Little interest or pleasure in doing things Not at all   Feeling down, depressed, irritable, or hopeless Not at all   Total Score PHQ 2 0   Trouble falling or staying asleep, or sleeping too much -   Feeling tired or having little energy -   Poor appetite, weight loss, or overeating -   Feeling bad about yourself - or that you are a failure or have let yourself or your family down -   Trouble concentrating on things such as school, work, reading, or watching TV -   Moving or speaking so slowly that other people could have noticed; or the opposite being so fidgety that others notice -   Thoughts of being better off dead, or hurting yourself in some way -   PHQ 9 Score -   How difficult have these problems made it for you to do your work, take care of your home and get along with others -       No flowsheet data found. Fall Risk Assessment:  :     No flowsheet data found. Abuse Screening:  :     Abuse Screening Questionnaire 8/24/2018   Do you ever feel afraid of your partner? N   Are you in a relationship with someone who physically or mentally threatens you? N   Is it safe for you to go home?  Y       ADL Screening:  :     ADL Assessment 2/5/2019   Feeding yourself No Help Needed   Getting from bed to chair No Help Needed   Getting dressed No Help Needed   Bathing or showering No Help Needed   Walk across the room (includes cane/walker) No Help Needed   Using the telphone No Help Needed   Taking your medications No Help Needed   Preparing meals No Help Needed   Managing money (expenses/bills) No Help Needed   Moderately strenuous housework (laundry) No Help Needed   Shopping for personal items (toiletries/medicines) No Help Needed   Shopping for groceries No Help Needed   Driving No Help Needed   Climbing a flight of stairs No Help Needed   Getting to places beyond walking distances No Help Needed           BMI:  Weight Metrics 9/18/2019 9/4/2019 8/20/2019 8/13/2019 8/2/2019 7/26/2019 6/25/2019   Weight 252 lb 4.8 oz 246 lb 246 lb 239 lb 246 lb 8 oz 245 lb 9.6 oz 258 lb 14.4 oz   BMI 31.54 kg/m2 30.75 kg/m2 30.75 kg/m2 29.87 kg/m2 30.81 kg/m2 30.7 kg/m2 32.36 kg/m2           Medication reconciliation up to date and corrected with patient at this time.

## 2019-09-18 NOTE — PROGRESS NOTES
Chief Complaint   Patient presents with    Follow-up     Room 5         HPI:  The patient is a 44 y.o. male who presents today for a follow up appointment. No hospital, ER or specialist visits since last primary care visit except as noted below. Knee Injury:   He has been on FMLA for knee injury from 7/20/2019. He was referred to Ortho/PT for further evaluation and treatment. He had FMLA approved through 8/14/2019. He needs a work note and FMLA extension from 8/15/2019 to RTW on Monday, 9/9/2019. He reports GERD with NSAIDs. Taking Zantac without relief. He returned to work today, [facility] approved. Still needing PRN Hydrocodone and Naproxen for pain control. ADD:  Wants to stop taking amphetamines at this time. He has started working with his EAP, wants to start outpatient therapy at [facility]. He started the program on 8/21/2019. He was started on Strattera. He reports he has struggled without taking Adderall. He had another prescription and had it filled. He reports the Nely Frederick is making him feel agitated and he feels his Durwin Prior is messed up\" with taking it. He would like to consider restarting the Adderall at this time. Review of Systems  A comprehensive review of systems was negative except for that written in the HPI. Patient Active Problem List   Diagnosis Code    Adjustment reaction with anxiety and depression F43.23    Vitamin D deficiency E55.9    Hypercholesterolemia E78.00    Gastroesophageal reflux disease K21.9    ADD (attention deficit disorder) without hyperactivity F98.8    Moderate major depression ([address] F32.1    Vitamin B12 deficiency E53.8    Hypothyroidism E03.9    Thyroid disease E07.9    Other chronic pain G89.29    Erectile dysfunction following radiation therapy N52.35    Acute pain of left knee M25.562    Injury of left knee S89. 92XA    Instability of left knee joint M25.362       Past Medical History:   Diagnosis Date    ADD (attention deficit disorder) without hyperactivity 2018    Adjustment reaction with anxiety and depression     Attention deficit hyperactivity disorder (ADHD), predominantly inattentive type 2018    Cancer ([facility]     testicular     Cancer ([facility] 2010    Testicular    Community acquired pneumonia     Depression     GERD (gastroesophageal reflux disease)     Headache     Hypercholesterolemia     Hypertension     Thyroid disease 2017    Vitamin D deficiency        Past Surgical History:   Procedure Laterality Date    HX ORCHIECTOMY Right 2010    HX UROLOGICAL      removed on e testicle    HX WISDOM TEETH EXTRACTION         Social History     Tobacco Use    Smoking status: Former Smoker     Packs/day: 1.00     Years: 8.00     Pack years: 8.00     Types: Cigarettes     Last attempt to quit:      Years since quittin.7    Smokeless tobacco: Never Used   Substance Use Topics    Alcohol use: Yes     Alcohol/week: 10.0 standard drinks     Types: 24 Cans of beer per week     Frequency: Monthly or less     Drinks per session: 1 or 2     Binge frequency: Less than monthly     Comment: weekly    Drug use: No       Family History   Problem Relation Age of Onset    Cancer Mother         Ovarian    Hypertension Mother     Cancer Father         Throat    Hypertension Father     Arrhythmia Brother         Afib    No Known Problems Maternal Grandmother     No Known Problems Maternal Grandfather     No Known Problems Paternal Grandmother     No Known Problems Paternal Grandfather        Outpatient Medications Marked as Taking for the 19 encounter (Office Visit) with Juan Francisco Xiong NP   Medication Sig Dispense Refill    dextroamphetamine-amphetamine (ADDERALL) 20 mg tablet TAKE 1 TABLET BY MOUTH TWO TIMES A DAY FOR 29 DAYS - MAX DAILY AMOUNT:2 TABS 60 Tab 0    HYDROcodone-acetaminophen (NORCO)  mg tablet Take 1 Tab by mouth every six (6) hours as needed for Pain for up to 30 days.  Max Daily Amount: 4 Tabs. 90 Tab 0    naproxen (NAPROSYN) 500 mg tablet Take 1 Tab by mouth two (2) times daily (with meals). 60 Tab 1    pantoprazole (PROTONIX) 40 mg tablet Take 1 Tab by mouth daily. 90 Tab 1    tadalafil (CIALIS) 5 mg tablet Take 1 Tab by mouth daily as needed for Other (ED). 30 Tab 2    levothyroxine (SYNTHROID) 50 mcg tablet Take 1 Tab by mouth Daily (before breakfast). 90 Tab 1    atorvastatin (LIPITOR) 20 mg tablet Take 1 Tab by mouth daily. 90 Tab 1    buPROPion XL (WELLBUTRIN XL) 300 mg XL tablet Take 1 Tab by mouth every morning. 90 Tab 1    multivitamin (ONE A DAY) tablet Take 1 Tab by mouth daily. Current Outpatient Medications on File Prior to Visit   Medication Sig Dispense Refill    naproxen (NAPROSYN) 500 mg tablet Take 1 Tab by mouth two (2) times daily (with meals). 60 Tab 1    pantoprazole (PROTONIX) 40 mg tablet Take 1 Tab by mouth daily. 90 Tab 1    tadalafil (CIALIS) 5 mg tablet Take 1 Tab by mouth daily as needed for Other (ED). 30 Tab 2    levothyroxine (SYNTHROID) 50 mcg tablet Take 1 Tab by mouth Daily (before breakfast). 90 Tab 1    atorvastatin (LIPITOR) 20 mg tablet Take 1 Tab by mouth daily. 90 Tab 1    buPROPion XL (WELLBUTRIN XL) 300 mg XL tablet Take 1 Tab by mouth every morning. 90 Tab 1    multivitamin (ONE A DAY) tablet Take 1 Tab by mouth daily.  albuterol (PROVENTIL HFA, VENTOLIN HFA, PROAIR HFA) 90 mcg/actuation inhaler Take 2 Puffs by inhalation every four (4) hours as needed for Wheezing. 1 Inhaler 2     No current facility-administered medications on file prior to visit.         No Known Allergies    PE:  Visit Vitals  /89 (BP 1 Location: Right arm, BP Patient Position: Sitting)   Pulse 69   Temp 97.9 °F (36.6 °C) (Oral)   Resp 19   Ht 6' 3\" (1.905 m)   Wt 252 lb 4.8 oz (114.4 kg)   SpO2 99%   BMI 31.54 kg/m²       Gen: alert, oriented, no acute distress  Head: normocephalic, atraumatic  Ears: external auditory canals clear, TMs without erythema or effusion  Eyes: pupils equal round reactive to light, sclera clear, conjunctiva clear  Oral: moist mucus membranes, no oral lesions, no pharyngeal inflammation or exudate  Neck: symmetric normal sized thyroid, no carotid bruits, no jugular vein distention  Resp: no increase work of breathing, lungs clear to ausculation bilaterally, no wheezing, rales or rhonchi  CV: S1, S2 normal.  No murmurs, rubs, or gallops. Abd: soft, not tender, not distended. No hepatosplenomegaly. Normal bowel sounds. No hernias. No abdominal or renal bruits. Neuro: cranial nerves intact, normal strength and movement in all extremities, reflexes and sensation intact and symmetric. Skin: no lesion or rash  Extremities: no cyanosis or edema    No results found for this visit on 09/18/19. Assessment/Plan:    ICD-10-CM ICD-9-CM    1. ADD (attention deficit disorder) without hyperactivity F98.8 314.00 dextroamphetamine-amphetamine (ADDERALL) 20 mg tablet   2. Other chronic pain G89.29 338.29 HYDROcodone-acetaminophen (NORCO)  mg tablet   3. Encounter for immunization Z23 V03.89 INFLUENZA VIRUS VAC QUAD,SPLIT,PRESV FREE SYRINGE IM      OK IMMUNIZ ADMIN,1 SINGLE/COMB VAC/TOXOID   4. Adjustment reaction with anxiety and depression F43.23 309.28      Diagnoses and all orders for this visit:    1. ADD (attention deficit disorder) without hyperactivity  -     dextroamphetamine-amphetamine (ADDERALL) 20 mg tablet; TAKE 1 TABLET BY MOUTH TWO TIMES A DAY FOR 29 DAYS - MAX DAILY AMOUNT:2 TABS    2. Other chronic pain  -     HYDROcodone-acetaminophen (NORCO)  mg tablet; Take 1 Tab by mouth every six (6) hours as needed for Pain for up to 30 days. Max Daily Amount: 4 Tabs. 3. Encounter for immunization  -     INFLUENZA VIRUS VAC QUAD,SPLIT,PRESV FREE SYRINGE IM  -     OK IMMUNIZ ADMIN,1 SINGLE/COMB VAC/TOXOID    4.  Adjustment reaction with anxiety and depression      Follow-up and Dispositions    · Return in about 4 weeks (around 10/16/2019) for Medication Check, ADHD/ADD, Chronic Pain.       lab results and schedule of future lab studies reviewed with patient  reviewed diet, exercise and weight control  reviewed medications and side effects in detail    call if any problems    Health Maintenance reviewed - reviewed, flu vaccine given today in the office, otherwise UTD. Recommended healthy diet low in carbohydrates, fats, sodium and cholesterol. Recommended regular cardiovascular exercise 3-6 times per week for 30-60 minutes daily. Chart is reviewed and updated today in the office. Records requested for other providers patient has seen and is currently seeing. Patient was offered a choice/choices in the treatment plan today. Patient expresses understanding of the plan and agrees with recommendations. Verbal and written instructions (see AVS) provided. See patient instructions for more. Patient expresses understanding of diagnosis and treatment plan.

## 2019-09-18 NOTE — PROGRESS NOTES
Immunization/s administered on 9/18/2019 by Juanita Wagner LPN per Yifan Griffith NP with patients consent signed. Patient tolerated procedure well. No reactions noted.

## 2019-10-18 ENCOUNTER — OFFICE VISIT (OUTPATIENT)
Dept: FAMILY MEDICINE CLINIC | Age: 44
End: 2019-10-18

## 2019-10-18 VITALS
SYSTOLIC BLOOD PRESSURE: 138 MMHG | WEIGHT: 255 LBS | HEART RATE: 63 BPM | TEMPERATURE: 98.1 F | RESPIRATION RATE: 20 BRPM | BODY MASS INDEX: 31.71 KG/M2 | HEIGHT: 75 IN | OXYGEN SATURATION: 99 % | DIASTOLIC BLOOD PRESSURE: 76 MMHG

## 2019-10-18 DIAGNOSIS — N52.35 ERECTILE DYSFUNCTION FOLLOWING RADIATION THERAPY: ICD-10-CM

## 2019-10-18 DIAGNOSIS — F98.8 ADD (ATTENTION DEFICIT DISORDER) WITHOUT HYPERACTIVITY: Primary | ICD-10-CM

## 2019-10-18 RX ORDER — DEXTROAMPHETAMINE SACCHARATE, AMPHETAMINE ASPARTATE, DEXTROAMPHETAMINE SULFATE AND AMPHETAMINE SULFATE 5; 5; 5; 5 MG/1; MG/1; MG/1; MG/1
20 TABLET ORAL 2 TIMES DAILY
Qty: 60 TAB | Refills: 0 | Status: SHIPPED | OUTPATIENT
Start: 2019-10-18 | End: 2019-11-17

## 2019-10-18 RX ORDER — SILDENAFIL 25 MG/1
25 TABLET, FILM COATED ORAL AS NEEDED
Qty: 30 TAB | Refills: 1 | Status: SHIPPED | OUTPATIENT
Start: 2019-10-18 | End: 2019-12-24 | Stop reason: SDUPTHER

## 2019-10-18 RX ORDER — DEXTROAMPHETAMINE SACCHARATE, AMPHETAMINE ASPARTATE, DEXTROAMPHETAMINE SULFATE AND AMPHETAMINE SULFATE 5; 5; 5; 5 MG/1; MG/1; MG/1; MG/1
20 TABLET ORAL 2 TIMES DAILY
Qty: 60 TAB | Refills: 0 | Status: SHIPPED | OUTPATIENT
Start: 2019-11-17 | End: 2019-12-17

## 2019-10-18 RX ORDER — DEXTROAMPHETAMINE SACCHARATE, AMPHETAMINE ASPARTATE, DEXTROAMPHETAMINE SULFATE AND AMPHETAMINE SULFATE 5; 5; 5; 5 MG/1; MG/1; MG/1; MG/1
20 TABLET ORAL 2 TIMES DAILY
Qty: 60 TAB | Refills: 0 | Status: SHIPPED | OUTPATIENT
Start: 2019-12-13 | End: 2019-12-24 | Stop reason: DRUGHIGH

## 2019-10-18 NOTE — PROGRESS NOTES
Briseyda Griffin  Identified pt with two pt identifiers(name and ). Chief Complaint   Patient presents with    Follow-up     Room 4       1. Have you been to the ER, urgent care clinic since your last visit?n   Hospitalized since your last visit? n     2. Have you seen or consulted any other health care providers outside of the [address] [place] [address] since your last visit? Include any pap smears or colon screening. n       Advance Care Planning    In the event something were to happen to you and you were unable to speak on your behalf, do you have an Advance Directive/ Living Will in place stating your wishes? NO    If yes, do we have a copy on file NO    If no, would you like information NO    Medication reconciliation up to date and corrected with patient at this time. Today's provider has been notified of reason for visit, vitals and flowsheets obtained on patients. Reviewed record in preparation for visit, huddled with provider and have obtained necessary documentation. There are no preventive care reminders to display for this patient.     Wt Readings from Last 3 Encounters:   10/18/19 255 lb (115.7 kg)   19 252 lb 4.8 oz (114.4 kg)   19 246 lb (111.6 kg)     Temp Readings from Last 3 Encounters:   10/18/19 98.1 °F (36.7 °C) (Oral)   19 97.9 °F (36.6 °C) (Oral)   19 96.6 °F (35.9 °C) (Oral)     BP Readings from Last 3 Encounters:   10/18/19 138/76   19 127/89   19 143/89     Pulse Readings from Last 3 Encounters:   10/18/19 63   19 69   19 (!) 113     Vitals:    10/18/19 1543   BP: 138/76   Pulse: 63   Resp: 20   Temp: 98.1 °F (36.7 °C)   TempSrc: Oral   SpO2: 99%   Weight: 255 lb (115.7 kg)   Height: 6' 3\" (1.905 m)   PainSc:   0 - No pain         Learning Assessment:  :     Learning Assessment 2015   PRIMARY LEARNER Patient Patient   HIGHEST LEVEL OF EDUCATION - PRIMARY LEARNER  GRADUATED HIGH SCHOOL OR GED -   Gina Mariano PRIMARY LEARNER NONE -   CO-LEARNER CAREGIVER No -   PRIMARY LANGUAGE ENGLISH ENGLISH   LEARNER PREFERENCE PRIMARY READING OTHER (COMMENT)   ANSWERED BY patient pt   RELATIONSHIP SELF SELF       Depression Screening:  :     3 most recent PHQ Screens 9/18/2019   Little interest or pleasure in doing things Not at all   Feeling down, depressed, irritable, or hopeless Not at all   Total Score PHQ 2 0   Trouble falling or staying asleep, or sleeping too much Not at all   Feeling tired or having little energy Not at all   Poor appetite, weight loss, or overeating Not at all   Feeling bad about yourself - or that you are a failure or have let yourself or your family down Not at all   Trouble concentrating on things such as school, work, reading, or watching TV Not at all   Moving or speaking so slowly that other people could have noticed; or the opposite being so fidgety that others notice Not at all   Thoughts of being better off dead, or hurting yourself in some way Not at all   PHQ 9 Score 0   How difficult have these problems made it for you to do your work, take care of your home and get along with others -       No flowsheet data found. Fall Risk Assessment:  :     No flowsheet data found. Abuse Screening:  :     Abuse Screening Questionnaire 8/24/2018   Do you ever feel afraid of your partner? N   Are you in a relationship with someone who physically or mentally threatens you? N   Is it safe for you to go home?  Y       ADL Screening:  :     ADL Assessment 2/5/2019   Feeding yourself No Help Needed   Getting from bed to chair No Help Needed   Getting dressed No Help Needed   Bathing or showering No Help Needed   Walk across the room (includes cane/walker) No Help Needed   Using the telphone No Help Needed   Taking your medications No Help Needed   Preparing meals No Help Needed   Managing money (expenses/bills) No Help Needed   Moderately strenuous housework (laundry) No Help Needed   Shopping for personal items (toiletries/medicines) No Help Needed   Shopping for groceries No Help Needed   Driving No Help Needed   Climbing a flight of stairs No Help Needed   Getting to places beyond walking distances No Help Needed           BMI:  Weight Metrics 10/18/2019 9/18/2019 9/4/2019 8/20/2019 8/13/2019 8/2/2019 7/26/2019   Weight 255 lb 252 lb 4.8 oz 246 lb 246 lb 239 lb 246 lb 8 oz 245 lb 9.6 oz   BMI 31.87 kg/m2 31.54 kg/m2 30.75 kg/m2 30.75 kg/m2 29.87 kg/m2 30.81 kg/m2 30.7 kg/m2           Medication reconciliation up to date and corrected with patient at this time.

## 2019-10-18 NOTE — PROGRESS NOTES
Chief Complaint   Patient presents with    Follow-up     Room 4         HPI:  The patient is a 44 y.o. male who presents today for a follow up appointment. No hospital, ER or specialist visits since last primary care visit except as noted below. ADD:  Wants to stop taking amphetamines at this time. He has started working with his EAP, wants to start outpatient therapy at [facility]. He started the program on 8/21/2019. He was started on Strattera. He reports he has struggled without taking Adderall. He had another prescription and had it filled. He reports the Dolphus Brannon is making him feel agitated and he feels his Sherre Drilling is messed up\" with taking it. He would like to consider restarting the Adderall at this time. Review of Systems  A comprehensive review of systems was negative except for that written in the HPI. Patient Active Problem List   Diagnosis Code    Adjustment reaction with anxiety and depression F43.23    Vitamin D deficiency E55.9    Hypercholesterolemia E78.00    Gastroesophageal reflux disease K21.9    ADD (attention deficit disorder) without hyperactivity F98.8    Moderate major depression ([address] F32.1    Vitamin B12 deficiency E53.8    Hypothyroidism E03.9    Thyroid disease E07.9    Other chronic pain G89.29    Erectile dysfunction following radiation therapy N52.35    Acute pain of left knee M25.562    Injury of left knee S89. 92XA    Instability of left knee joint M25.362       Past Medical History:   Diagnosis Date    ADD (attention deficit disorder) without hyperactivity 6/7/2018    Adjustment reaction with anxiety and depression     Attention deficit hyperactivity disorder (ADHD), predominantly inattentive type 6/7/2018    Cancer ([address]     testicular     Cancer ([address] 2010    Testicular    Community acquired pneumonia     Depression     GERD (gastroesophageal reflux disease)     Headache     Hypercholesterolemia     Hypertension     Thyroid disease 2017    Vitamin D deficiency        Past Surgical History:   Procedure Laterality Date    HX ORCHIECTOMY Right 2010    HX UROLOGICAL      removed on e testicle    HX WISDOM TEETH EXTRACTION         Social History     Tobacco Use    Smoking status: Former Smoker     Packs/day: 1.00     Years: 8.00     Pack years: 8.00     Types: Cigarettes     Last attempt to quit:      Years since quittin.8    Smokeless tobacco: Never Used   Substance Use Topics    Alcohol use: Yes     Alcohol/week: 10.0 standard drinks     Types: 24 Cans of beer per week     Frequency: Monthly or less     Drinks per session: 1 or 2     Binge frequency: Less than monthly     Comment: weekly    Drug use: No       Family History   Problem Relation Age of Onset    Cancer Mother         Ovarian    Hypertension Mother     Cancer Father         Throat    Hypertension Father     Arrhythmia Brother         Afib    No Known Problems Maternal Grandmother     No Known Problems Maternal Grandfather     No Known Problems Paternal Grandmother     No Known Problems Paternal Grandfather        Outpatient Medications Marked as Taking for the 10/18/19 encounter (Office Visit) with Flower Rich NP   Medication Sig Dispense Refill    [START ON 2019] dextroamphetamine-amphetamine (ADDERALL) 20 mg tablet Take 1 Tab by mouth two (2) times a day for 30 days. Max Daily Amount: 40 mg. 60 Tab 0    [START ON 2019] dextroamphetamine-amphetamine (ADDERALL) 20 mg tablet Take 1 Tab by mouth two (2) times a day for 30 days. Max Daily Amount: 40 mg. 60 Tab 0    dextroamphetamine-amphetamine (ADDERALL) 20 mg tablet Take 1 Tab by mouth two (2) times a day for 30 days. Max Daily Amount: 40 mg. 60 Tab 0    sildenafil citrate (VIAGRA) 25 mg tablet Take 1 Tab by mouth as needed for Other (ED). 30 Tab 1    naproxen (NAPROSYN) 500 mg tablet Take 1 Tab by mouth two (2) times daily (with meals).  60 Tab 1    pantoprazole (PROTONIX) 40 mg tablet Take 1 Tab by mouth daily. 90 Tab 1    levothyroxine (SYNTHROID) 50 mcg tablet Take 1 Tab by mouth Daily (before breakfast). 90 Tab 1    atorvastatin (LIPITOR) 20 mg tablet Take 1 Tab by mouth daily. 90 Tab 1    buPROPion XL (WELLBUTRIN XL) 300 mg XL tablet Take 1 Tab by mouth every morning. 90 Tab 1    multivitamin (ONE A DAY) tablet Take 1 Tab by mouth daily. Current Outpatient Medications on File Prior to Visit   Medication Sig Dispense Refill    naproxen (NAPROSYN) 500 mg tablet Take 1 Tab by mouth two (2) times daily (with meals). 60 Tab 1    pantoprazole (PROTONIX) 40 mg tablet Take 1 Tab by mouth daily. 90 Tab 1    levothyroxine (SYNTHROID) 50 mcg tablet Take 1 Tab by mouth Daily (before breakfast). 90 Tab 1    atorvastatin (LIPITOR) 20 mg tablet Take 1 Tab by mouth daily. 90 Tab 1    buPROPion XL (WELLBUTRIN XL) 300 mg XL tablet Take 1 Tab by mouth every morning. 90 Tab 1    multivitamin (ONE A DAY) tablet Take 1 Tab by mouth daily.  [DISCONTINUED] dextroamphetamine-amphetamine (ADDERALL) 20 mg tablet TAKE 1 TABLET BY MOUTH TWO TIMES A DAY FOR 29 DAYS - MAX DAILY AMOUNT:2 TABS 60 Tab 0    [DISCONTINUED] HYDROcodone-acetaminophen (NORCO)  mg tablet Take 1 Tab by mouth every six (6) hours as needed for Pain for up to 30 days. Max Daily Amount: 4 Tabs. 90 Tab 0    [DISCONTINUED] tadalafil (CIALIS) 5 mg tablet Take 1 Tab by mouth daily as needed for Other (ED). 30 Tab 2    [DISCONTINUED] albuterol (PROVENTIL HFA, VENTOLIN HFA, PROAIR HFA) 90 mcg/actuation inhaler Take 2 Puffs by inhalation every four (4) hours as needed for Wheezing. 1 Inhaler 2     No current facility-administered medications on file prior to visit.         No Known Allergies    PE:  Visit Vitals  /76 (BP 1 Location: Right arm, BP Patient Position: Sitting)   Pulse 63   Temp 98.1 °F (36.7 °C) (Oral)   Resp 20   Ht 6' 3\" (1.905 m)   Wt 255 lb (115.7 kg)   SpO2 99%   BMI 31.87 kg/m²       Gen: alert, oriented, no acute distress  Head: normocephalic, atraumatic  Ears: external auditory canals clear, TMs without erythema or effusion  Eyes: pupils equal round reactive to light, sclera clear, conjunctiva clear  Oral: moist mucus membranes, no oral lesions, no pharyngeal inflammation or exudate  Neck: symmetric normal sized thyroid, no carotid bruits, no jugular vein distention  Resp: no increase work of breathing, lungs clear to ausculation bilaterally, no wheezing, rales or rhonchi  CV: S1, S2 normal.  No murmurs, rubs, or gallops. Abd: soft, not tender, not distended. No hepatosplenomegaly. Normal bowel sounds. No hernias. No abdominal or renal bruits. Neuro: cranial nerves intact, normal strength and movement in all extremities, reflexes and sensation intact and symmetric. Skin: no lesion or rash  Extremities: no cyanosis or edema    No results found for this visit on 10/18/19. Assessment/Plan:    ICD-10-CM ICD-9-CM    1. ADD (attention deficit disorder) without hyperactivity F98.8 314.00 dextroamphetamine-amphetamine (ADDERALL) 20 mg tablet      dextroamphetamine-amphetamine (ADDERALL) 20 mg tablet      dextroamphetamine-amphetamine (ADDERALL) 20 mg tablet   2. Erectile dysfunction following radiation therapy N52.35 607.84 sildenafil citrate (VIAGRA) 25 mg tablet     Diagnoses and all orders for this visit:    1. ADD (attention deficit disorder) without hyperactivity  -     dextroamphetamine-amphetamine (ADDERALL) 20 mg tablet; Take 1 Tab by mouth two (2) times a day for 30 days. Max Daily Amount: 40 mg.  -     dextroamphetamine-amphetamine (ADDERALL) 20 mg tablet; Take 1 Tab by mouth two (2) times a day for 30 days. Max Daily Amount: 40 mg.  -     dextroamphetamine-amphetamine (ADDERALL) 20 mg tablet; Take 1 Tab by mouth two (2) times a day for 30 days. Max Daily Amount: 40 mg.    2. Erectile dysfunction following radiation therapy  -     sildenafil citrate (VIAGRA) 25 mg tablet;  Take 1 Tab by mouth as needed for Other (ED). Follow-up and Dispositions    · Return in about 3 months (around 1/18/2020) for ADHD/ADD.       lab results and schedule of future lab studies reviewed with patient  reviewed diet, exercise and weight control  reviewed medications and side effects in detail    lose weight, increase physical activity, continue present plan, call if any problems    Health Maintenance reviewed - reviewed, UTD. Recommended healthy diet low in carbohydrates, fats, sodium and cholesterol. Recommended regular cardiovascular exercise 3-6 times per week for 30-60 minutes daily. Chart is reviewed and updated today in the office. Records requested for other providers patient has seen and is currently seeing. Patient was offered a choice/choices in the treatment plan today. Patient expresses understanding of the plan and agrees with recommendations. Verbal and written instructions (see AVS) provided. See patient instructions for more. Patient expresses understanding of diagnosis and treatment plan.

## 2019-11-06 ENCOUNTER — TELEPHONE (OUTPATIENT)
Dept: FAMILY MEDICINE CLINIC | Age: 44
End: 2019-11-06

## 2019-11-06 NOTE — TELEPHONE ENCOUNTER
----- Message from Radha Shen sent at 11/6/2019  2:23 PM EST -----  Regarding: Brad/telephone  Pt is requesting for you to call him and he did not want to leave a message. Pts number is [phone].

## 2019-11-07 NOTE — TELEPHONE ENCOUNTER
Verified two patient identifiers, name and date of birth. Patient requesting an appointment.  appointment set for 11/20/19 1030am

## 2019-12-19 DIAGNOSIS — F32.1 MODERATE MAJOR DEPRESSION (HCC): ICD-10-CM

## 2019-12-20 RX ORDER — BUPROPION HYDROCHLORIDE 300 MG/1
TABLET ORAL
Qty: 90 TAB | Refills: 1 | Status: SHIPPED | OUTPATIENT
Start: 2019-12-20 | End: 2020-02-26 | Stop reason: SDUPTHER

## 2019-12-24 ENCOUNTER — OFFICE VISIT (OUTPATIENT)
Dept: FAMILY MEDICINE CLINIC | Age: 44
End: 2019-12-24

## 2019-12-24 VITALS
BODY MASS INDEX: 31.21 KG/M2 | TEMPERATURE: 96 F | OXYGEN SATURATION: 100 % | DIASTOLIC BLOOD PRESSURE: 93 MMHG | WEIGHT: 251 LBS | RESPIRATION RATE: 16 BRPM | HEIGHT: 75 IN | HEART RATE: 76 BPM | SYSTOLIC BLOOD PRESSURE: 152 MMHG

## 2019-12-24 DIAGNOSIS — N52.35 ERECTILE DYSFUNCTION FOLLOWING RADIATION THERAPY: ICD-10-CM

## 2019-12-24 DIAGNOSIS — F32.1 MODERATE MAJOR DEPRESSION (HCC): ICD-10-CM

## 2019-12-24 DIAGNOSIS — F98.8 ADD (ATTENTION DEFICIT DISORDER) WITHOUT HYPERACTIVITY: Primary | ICD-10-CM

## 2019-12-24 DIAGNOSIS — G89.29 OTHER CHRONIC PAIN: ICD-10-CM

## 2019-12-24 DIAGNOSIS — R03.0 ELEVATED BLOOD PRESSURE READING: ICD-10-CM

## 2019-12-24 DIAGNOSIS — F43.23 ADJUSTMENT REACTION WITH ANXIETY AND DEPRESSION: ICD-10-CM

## 2019-12-24 PROBLEM — S89.92XA INJURY OF LEFT KNEE: Status: RESOLVED | Noted: 2019-08-13 | Resolved: 2019-12-24

## 2019-12-24 PROBLEM — M25.362 INSTABILITY OF LEFT KNEE JOINT: Status: RESOLVED | Noted: 2019-08-13 | Resolved: 2019-12-24

## 2019-12-24 PROBLEM — M25.562 ACUTE PAIN OF LEFT KNEE: Status: RESOLVED | Noted: 2019-08-13 | Resolved: 2019-12-24

## 2019-12-24 RX ORDER — DEXTROAMPHETAMINE SACCHARATE, AMPHETAMINE ASPARTATE, DEXTROAMPHETAMINE SULFATE AND AMPHETAMINE SULFATE 7.5; 7.5; 7.5; 7.5 MG/1; MG/1; MG/1; MG/1
30 TABLET ORAL 2 TIMES DAILY
Qty: 60 TAB | Refills: 0 | Status: SHIPPED | OUTPATIENT
Start: 2019-12-24 | End: 2020-01-23

## 2019-12-24 RX ORDER — DEXTROAMPHETAMINE SACCHARATE, AMPHETAMINE ASPARTATE, DEXTROAMPHETAMINE SULFATE AND AMPHETAMINE SULFATE 7.5; 7.5; 7.5; 7.5 MG/1; MG/1; MG/1; MG/1
30 TABLET ORAL 2 TIMES DAILY
Qty: 60 TAB | Refills: 0 | Status: SHIPPED | OUTPATIENT
Start: 2020-01-23 | End: 2020-02-22

## 2019-12-24 RX ORDER — DEXTROAMPHETAMINE SACCHARATE, AMPHETAMINE ASPARTATE, DEXTROAMPHETAMINE SULFATE AND AMPHETAMINE SULFATE 7.5; 7.5; 7.5; 7.5 MG/1; MG/1; MG/1; MG/1
30 TABLET ORAL 2 TIMES DAILY
Qty: 60 TAB | Refills: 0 | Status: SHIPPED | OUTPATIENT
Start: 2020-02-22 | End: 2020-02-26 | Stop reason: SDUPTHER

## 2019-12-24 RX ORDER — HYDROCODONE BITARTRATE AND ACETAMINOPHEN 10; 325 MG/1; MG/1
1 TABLET ORAL
Qty: 90 TAB | Refills: 0 | Status: SHIPPED | OUTPATIENT
Start: 2019-12-24 | End: 2020-02-26 | Stop reason: SDUPTHER

## 2019-12-24 RX ORDER — SILDENAFIL 25 MG/1
25 TABLET, FILM COATED ORAL AS NEEDED
Qty: 30 TAB | Refills: 1 | Status: SHIPPED | OUTPATIENT
Start: 2019-12-24 | End: 2020-02-26 | Stop reason: SDUPTHER

## 2019-12-24 NOTE — PROGRESS NOTES
Chief Complaint   Patient presents with    Medication Evaluation         HPI:  The patient is a 38 y.o. male who presents today for a follow up appointment. No hospital, ER or specialist visits since last primary care visit except as noted below. ADD:  Wants to stop taking amphetamines at this time. He has started working with his EAP, wants to start outpatient therapy at [facility]. He started the program on 8/21/2019. He was started on Strattera. He reports he has struggled without taking Adderall. He had another prescription and had it filled. He reports the Karrie Abo is making him feel agitated and he feels his Hardie Jeans is messed up\" with taking it. He would like to consider restarting the Adderall at this time. He reports his symptoms are not well controlled on 20mg dose of Adderall, he would like to consider increasing the dose back to 30mg. Difficulty focusing  Inability to complete daily tasks  Struggling with work    HTN:  Blood pressure had been improved and patient had stopped taking anti-hypertensive. His blood pressure is elevated in the office today however. BP Readings from Last 3 Encounters:   12/24/19 (!) 152/93   10/18/19 138/76   09/18/19 127/89     Knee Injury:   He has been on FMLA for knee injury from 7/20/2019. He was referred to Ortho/PT for further evaluation and treatment. He had FMLA approved through 8/14/2019. He needs a work note and FMLA extension from 8/15/2019 to RTW on Monday, 9/9/2019. He reports GERD with NSAIDs. Taking Zantac without relief. He returned to work today, [employer] approved. Still needing PRN Hydrocodone and Naproxen for pain control. Review of Systems  A comprehensive review of systems was negative except for that written in the HPI.     Patient Active Problem List   Diagnosis Code    Adjustment reaction with anxiety and depression F43.23    Vitamin D deficiency E55.9    Hypercholesterolemia E78.00    Gastroesophageal reflux disease K21.9    ADD (attention deficit disorder) without hyperactivity F98.8    Moderate major depression (HCC) F32.1    Vitamin B12 deficiency E53.8    Hypothyroidism E03.9    Thyroid disease E07.9    Other chronic pain G89.29    Erectile dysfunction following radiation therapy N52.35    Elevated blood pressure reading R03.0       Past Medical History:   Diagnosis Date    ADD (attention deficit disorder) without hyperactivity 2018    Adjustment reaction with anxiety and depression     Attention deficit hyperactivity disorder (ADHD), predominantly inattentive type 2018    Cancer ([facility]     testicular     Cancer ([facility] 2010    Testicular    Community acquired pneumonia     Depression     GERD (gastroesophageal reflux disease)     Headache     Hypercholesterolemia     Hypertension     Thyroid disease 2017    Vitamin D deficiency        Past Surgical History:   Procedure Laterality Date    HX ORCHIECTOMY Right 2010    HX UROLOGICAL      removed on e testicle    HX WISDOM TEETH EXTRACTION         Social History     Tobacco Use    Smoking status: Former Smoker     Packs/day: 1.00     Years: 8.00     Pack years: 8.00     Types: Cigarettes     Last attempt to quit:      Years since quittin.9    Smokeless tobacco: Never Used   Substance Use Topics    Alcohol use:  Yes     Alcohol/week: 10.0 standard drinks     Types: 24 Cans of beer per week     Frequency: Monthly or less     Drinks per session: 1 or 2     Binge frequency: Less than monthly     Comment: weekly    Drug use: No       Family History   Problem Relation Age of Onset    Cancer Mother         Ovarian    Hypertension Mother     Cancer Father         Throat    Hypertension Father     Arrhythmia Brother         Afib    No Known Problems Maternal Grandmother     No Known Problems Maternal Grandfather     No Known Problems Paternal Grandmother     No Known Problems Paternal Grandfather        Outpatient Medications Marked as Taking for the 12/24/19 encounter (Office Visit) with Smooth Palmer NP   Medication Sig Dispense Refill    dextroamphetamine-amphetamine (ADDERALL) 30 mg tablet Take 1 Tab by mouth two (2) times a day for 30 days. Max Daily Amount: 2 Tabs. 60 Tab 0    [START ON 1/23/2020] dextroamphetamine-amphetamine (ADDERALL) 30 mg tablet Take 1 Tab by mouth two (2) times a day for 30 days. Max Daily Amount: 2 Tabs. 60 Tab 0    [START ON 2/22/2020] dextroamphetamine-amphetamine (ADDERALL) 30 mg tablet Take 1 Tab by mouth two (2) times a day for 30 days. Max Daily Amount: 2 Tabs. 60 Tab 0    sildenafil citrate (VIAGRA) 25 mg tablet Take 1 Tab by mouth as needed for Other (ED). 30 Tab 1    HYDROcodone-acetaminophen (NORCO)  mg tablet Take 1 Tab by mouth every six (6) hours as needed for Pain for up to 30 days. Max Daily Amount: 4 Tabs. 90 Tab 0    buPROPion XL (WELLBUTRIN XL) 300 mg XL tablet TAKE 1 TABLET BY MOUTH EVERY MORNING 90 Tab 1    naproxen (NAPROSYN) 500 mg tablet Take 1 Tab by mouth two (2) times daily (with meals). 60 Tab 1    pantoprazole (PROTONIX) 40 mg tablet Take 1 Tab by mouth daily. 90 Tab 1    levothyroxine (SYNTHROID) 50 mcg tablet Take 1 Tab by mouth Daily (before breakfast). 90 Tab 1    atorvastatin (LIPITOR) 20 mg tablet Take 1 Tab by mouth daily. 90 Tab 1    multivitamin (ONE A DAY) tablet Take 1 Tab by mouth daily. Current Outpatient Medications on File Prior to Visit   Medication Sig Dispense Refill    buPROPion XL (WELLBUTRIN XL) 300 mg XL tablet TAKE 1 TABLET BY MOUTH EVERY MORNING 90 Tab 1    naproxen (NAPROSYN) 500 mg tablet Take 1 Tab by mouth two (2) times daily (with meals). 60 Tab 1    pantoprazole (PROTONIX) 40 mg tablet Take 1 Tab by mouth daily. 90 Tab 1    levothyroxine (SYNTHROID) 50 mcg tablet Take 1 Tab by mouth Daily (before breakfast). 90 Tab 1    atorvastatin (LIPITOR) 20 mg tablet Take 1 Tab by mouth daily.  90 Tab 1    multivitamin (ONE A DAY) tablet Take 1 Tab by mouth daily.  [DISCONTINUED] dextroamphetamine-amphetamine (ADDERALL) 20 mg tablet Take 1 Tab by mouth two (2) times a day for 30 days. Max Daily Amount: 40 mg. 60 Tab 0    [DISCONTINUED] sildenafil citrate (VIAGRA) 25 mg tablet Take 1 Tab by mouth as needed for Other (ED). 30 Tab 1     No current facility-administered medications on file prior to visit. No Known Allergies    PE:  Visit Vitals  BP (!) 152/93 (BP 1 Location: Right arm, BP Patient Position: Sitting)   Pulse 76   Temp 96 °F (35.6 °C) (Oral)   Resp 16   Ht 6' 3\" (1.905 m)   Wt 251 lb (113.9 kg)   SpO2 100%   BMI 31.37 kg/m²       Gen: alert, oriented, no acute distress  Head: normocephalic, atraumatic  Ears: external auditory canals clear, TMs without erythema or effusion  Eyes: pupils equal round reactive to light, sclera clear, conjunctiva clear  Oral: moist mucus membranes, no oral lesions, no pharyngeal inflammation or exudate  Neck: symmetric normal sized thyroid, no carotid bruits, no jugular vein distention  Resp: no increase work of breathing, lungs clear to ausculation bilaterally, no wheezing, rales or rhonchi  CV: S1, S2 normal.  No murmurs, rubs, or gallops. Abd: soft, not tender, not distended. No hepatosplenomegaly. Normal bowel sounds. No hernias. No abdominal or renal bruits. Neuro: cranial nerves intact, normal strength and movement in all extremities, reflexes and sensation intact and symmetric. Skin: no lesion or rash  Extremities: no cyanosis or edema    No results found for this visit on 12/24/19. Assessment/Plan:    ICD-10-CM ICD-9-CM    1. ADD (attention deficit disorder) without hyperactivity F98.8 314.00 dextroamphetamine-amphetamine (ADDERALL) 30 mg tablet      dextroamphetamine-amphetamine (ADDERALL) 30 mg tablet      dextroamphetamine-amphetamine (ADDERALL) 30 mg tablet   2. Elevated blood pressure reading R03.0 796.2    3. Moderate major depression (HCC) F32.1 296.22    4. Erectile dysfunction following radiation therapy N52.35 607.84 sildenafil citrate (VIAGRA) 25 mg tablet   5. Adjustment reaction with anxiety and depression F43.23 309.28    6. Other chronic pain G89.29 338.29 HYDROcodone-acetaminophen (NORCO)  mg tablet     Diagnoses and all orders for this visit:    1. ADD (attention deficit disorder) without hyperactivity  -     dextroamphetamine-amphetamine (ADDERALL) 30 mg tablet; Take 1 Tab by mouth two (2) times a day for 30 days. Max Daily Amount: 2 Tabs. -     dextroamphetamine-amphetamine (ADDERALL) 30 mg tablet; Take 1 Tab by mouth two (2) times a day for 30 days. Max Daily Amount: 2 Tabs. -     dextroamphetamine-amphetamine (ADDERALL) 30 mg tablet; Take 1 Tab by mouth two (2) times a day for 30 days. Max Daily Amount: 2 Tabs. 2. Elevated blood pressure reading    3. Moderate major depression ([address]    4. Erectile dysfunction following radiation therapy  -     sildenafil citrate (VIAGRA) 25 mg tablet; Take 1 Tab by mouth as needed for Other (ED). 5. Adjustment reaction with anxiety and depression    6. Other chronic pain  -     HYDROcodone-acetaminophen (NORCO)  mg tablet; Take 1 Tab by mouth every six (6) hours as needed for Pain for up to 30 days. Max Daily Amount: 4 Tabs. Follow-up and Dispositions    · Return in about 3 months (around 3/24/2020) for Medication Check, ADHD/ADD.       lab results and schedule of future lab studies reviewed with patient  reviewed diet, exercise and weight control  reviewed medications and side effects in detail    Labs - next annual fasting labs in 8/2020.     Controlled substance plan and safety assessment:  Medication: Norco 10/325  MME/day: 40   >= 50? no   >= 120? no  Naloxone Rx? no   appropriate and last checked on: 12/24/2019  Last Urine Toxicology: done, appropriate  Treatment agreement was signed by pt and myself on: 8/13/2019    24 hour opioid dose >120mg morphine equivalent/day:  [] Yes   [x] No  Benzodiazepines:  [] Yes   [x] No  Sleep apnea:  [] Yes   [x] No  Urine Toxicology Testing within last 6 months:  [x] Yes   [] No  History of or new aberrant medication taking behaviors:  [] Yes   [x] No    Controlled Substance Refills given  Date prescription signed by me  medication  Amount Refills   12/24/2019  Hydrocodone as above     12/24/2019  adderall as above       lose weight, increase physical activity, call if any problems    Health Maintenance reviewed - reviewed, UTD. Recommended healthy diet low in carbohydrates, fats, sodium and cholesterol. Recommended regular cardiovascular exercise 3-6 times per week for 30-60 minutes daily. Chart is reviewed and updated today in the office. Records requested for other providers patient has seen and is currently seeing. Patient was offered a choice/choices in the treatment plan today. Patient expresses understanding of the plan and agrees with recommendations. Verbal and written instructions (see AVS) provided. See patient instructions for more. Patient expresses understanding of diagnosis and treatment plan.

## 2019-12-24 NOTE — PROGRESS NOTES
1. Have you been to the ER, urgent care clinic since your last visit? Hospitalized since your last visit? No    2. Have you seen or consulted any other health care providers outside of the [address] [place] [address] since your last visit? Include any pap smears or colon screening.  No      Chief Complaint   Patient presents with    Medication Evaluation     Visit Vitals  BP (!) 152/93 (BP 1 Location: Right arm, BP Patient Position: Sitting)   Pulse 76   Temp 96 °F (35.6 °C) (Oral)   Resp 16   Ht 6' 3\" (1.905 m)   Wt 251 lb (113.9 kg)   SpO2 100%   BMI 31.37 kg/m²

## 2019-12-24 NOTE — PATIENT INSTRUCTIONS
Erectile Dysfunction: Care Instructions Your Care Instructions A man has erectile dysfunction (ED) when he routinely can't get or keep an erection that allows satisfactory sex. He may not be able to have an erection at any time. Or he may not be able to have one that is firm enough or lasts long enough to complete intercourse. ED is not the same as having trouble getting an erection now and then. That's common. It happens to most men at some time. ED can be caused by problems with the blood vessels, nerves, or hormones. It can be caused by diabetes, heart disease, and injuries. Nerve disorders, such as multiple sclerosis or Parkinson's disease, can also cause it. ED can also be caused by medicines, alcohol, and tobacco. Or it may be caused by depression, stress, grief, or relationship problems. Follow-up care is a key part of your treatment and safety. Be sure to make and go to all appointments, and call your doctor if you are having problems. It's also a good idea to know your test results and keep a list of the medicines you take. How can you care for yourself at home? 
 Lifestyle 
  · Limit alcohol. Have no more than 2 drinks a day.  
  · Do not smoke. Smoking makes it harder for the blood vessels in the penis to relax and let blood flow in. If you need help quitting, talk to your doctor about stop-smoking programs and medicines. These can increase your chances of quitting for good.  
  · Do not use cocaine, heroin, or other illegal drugs.  
  · Try to reduce stress.  
  · Give yourself time to adjust to change. Changes in your job, family, relationships, home life, and other areas can cause stress. And stress can cause erection problems.  
 Work with your partner 
  · Don't assume that you know what your partner likes when it comes to sex. You may be wrong. Talk about what each of you does and does not enjoy.  
  · Make time outside of the bedroom to talk about your sex life.  If you avoid sex because you are afraid of having erection problems, your partner may worry that you are no longer interested.  
  · If you and your partner have trouble talking about sex, see a therapist who can help you talk about it. Reading books with your partner about sexual health may also help.  
  · Relax. Take time for more foreplay. Worrying about your erections may only make things worse. Medicines 
  · Tell your doctor about all the medicines that you take. ? Some medicines can cause erection problems. ? Some medicines can have dangerous interactions with medicines that are prescribed for ED, including over-the-counter medicines and herbal products.  
  · Be safe with medicines. Take your medicines exactly as prescribed. Call your doctor if you think you are having a problem with your medicine.  
  · Talk to your doctor about trying a medicine to help you keep an erection. This could be a medicine such as Viagra, Levitra, or Cialis. If you have a heart problem, ask your doctor if these are safe for you. Do not take these medicines if you take nitroglycerin or other nitrate medicine. When should you call for help? Call your doctor now or seek immediate medical care if: 
  · You took a medicine for erectile dysfunction and you have an erection that lasts longer than 3 hours.  
 Watch closely for changes in your health, and be sure to contact your doctor if you have any problems. Where can you learn more? Go to http://charissa-maria elena.info/. Enter [id number] in the search box to learn more about \"Erectile Dysfunction: Care Instructions. \" Current as of: May 28, 2019 Content Version: 12.2 © 8399-6618 [employer]. Care instructions adapted under license by [employer] (which disclaims liability or warranty for this information).  If you have questions about a medical condition or this instruction, always ask your healthcare professional. [employer] disclaims any warranty or liability for your use of this information.

## 2020-02-26 ENCOUNTER — OFFICE VISIT (OUTPATIENT)
Dept: FAMILY MEDICINE CLINIC | Age: 45
End: 2020-02-26

## 2020-02-26 VITALS
RESPIRATION RATE: 20 BRPM | OXYGEN SATURATION: 99 % | SYSTOLIC BLOOD PRESSURE: 153 MMHG | HEART RATE: 78 BPM | DIASTOLIC BLOOD PRESSURE: 95 MMHG | TEMPERATURE: 96.9 F | BODY MASS INDEX: 32.3 KG/M2 | WEIGHT: 259.8 LBS | HEIGHT: 75 IN

## 2020-02-26 DIAGNOSIS — F43.23 ADJUSTMENT REACTION WITH ANXIETY AND DEPRESSION: ICD-10-CM

## 2020-02-26 DIAGNOSIS — E53.8 VITAMIN B12 DEFICIENCY: ICD-10-CM

## 2020-02-26 DIAGNOSIS — E03.9 HYPOTHYROIDISM, UNSPECIFIED TYPE: ICD-10-CM

## 2020-02-26 DIAGNOSIS — E55.9 VITAMIN D DEFICIENCY: ICD-10-CM

## 2020-02-26 DIAGNOSIS — I10 ESSENTIAL HYPERTENSION: Primary | ICD-10-CM

## 2020-02-26 DIAGNOSIS — K21.9 GASTROESOPHAGEAL REFLUX DISEASE, ESOPHAGITIS PRESENCE NOT SPECIFIED: ICD-10-CM

## 2020-02-26 DIAGNOSIS — F98.8 ADD (ATTENTION DEFICIT DISORDER) WITHOUT HYPERACTIVITY: ICD-10-CM

## 2020-02-26 DIAGNOSIS — F32.1 MODERATE MAJOR DEPRESSION (HCC): ICD-10-CM

## 2020-02-26 DIAGNOSIS — E78.00 HYPERCHOLESTEROLEMIA: ICD-10-CM

## 2020-02-26 DIAGNOSIS — M25.562 ACUTE PAIN OF LEFT KNEE: ICD-10-CM

## 2020-02-26 DIAGNOSIS — G89.29 OTHER CHRONIC PAIN: ICD-10-CM

## 2020-02-26 DIAGNOSIS — N52.35 ERECTILE DYSFUNCTION FOLLOWING RADIATION THERAPY: ICD-10-CM

## 2020-02-26 PROBLEM — R03.0 ELEVATED BLOOD PRESSURE READING: Status: RESOLVED | Noted: 2019-12-24 | Resolved: 2020-02-26

## 2020-02-26 RX ORDER — DEXTROAMPHETAMINE SACCHARATE, AMPHETAMINE ASPARTATE, DEXTROAMPHETAMINE SULFATE AND AMPHETAMINE SULFATE 5; 5; 5; 5 MG/1; MG/1; MG/1; MG/1
20 TABLET ORAL DAILY
Qty: 30 TAB | Refills: 0 | Status: SHIPPED | OUTPATIENT
Start: 2020-04-26 | End: 2020-03-24 | Stop reason: SDUPTHER

## 2020-02-26 RX ORDER — LEVOTHYROXINE SODIUM 50 UG/1
50 TABLET ORAL
Qty: 90 TAB | Refills: 1 | Status: SHIPPED | OUTPATIENT
Start: 2020-02-26

## 2020-02-26 RX ORDER — BUPROPION HYDROCHLORIDE 300 MG/1
TABLET ORAL
Qty: 90 TAB | Refills: 1 | Status: SHIPPED | OUTPATIENT
Start: 2020-02-26

## 2020-02-26 RX ORDER — DEXTROAMPHETAMINE SACCHARATE, AMPHETAMINE ASPARTATE, DEXTROAMPHETAMINE SULFATE AND AMPHETAMINE SULFATE 5; 5; 5; 5 MG/1; MG/1; MG/1; MG/1
20 TABLET ORAL DAILY
Qty: 30 TAB | Refills: 0 | Status: SHIPPED | OUTPATIENT
Start: 2020-02-26 | End: 2020-03-24 | Stop reason: SDUPTHER

## 2020-02-26 RX ORDER — DEXTROAMPHETAMINE SACCHARATE, AMPHETAMINE ASPARTATE MONOHYDRATE, DEXTROAMPHETAMINE SULFATE AND AMPHETAMINE SULFATE 7.5; 7.5; 7.5; 7.5 MG/1; MG/1; MG/1; MG/1
30 CAPSULE, EXTENDED RELEASE ORAL DAILY
Qty: 30 CAP | Refills: 0 | Status: SHIPPED | OUTPATIENT
Start: 2020-04-26 | End: 2020-05-25

## 2020-02-26 RX ORDER — LISINOPRIL 10 MG/1
10 TABLET ORAL DAILY
Qty: 90 TAB | Refills: 1 | Status: SHIPPED | OUTPATIENT
Start: 2020-02-26

## 2020-02-26 RX ORDER — ATORVASTATIN CALCIUM 20 MG/1
20 TABLET, FILM COATED ORAL DAILY
Qty: 90 TAB | Refills: 1 | Status: SHIPPED | OUTPATIENT
Start: 2020-02-26

## 2020-02-26 RX ORDER — DEXTROAMPHETAMINE SACCHARATE, AMPHETAMINE ASPARTATE MONOHYDRATE, DEXTROAMPHETAMINE SULFATE AND AMPHETAMINE SULFATE 7.5; 7.5; 7.5; 7.5 MG/1; MG/1; MG/1; MG/1
30 CAPSULE, EXTENDED RELEASE ORAL DAILY
Qty: 30 CAP | Refills: 0 | Status: SHIPPED | OUTPATIENT
Start: 2020-02-26 | End: 2020-03-26

## 2020-02-26 RX ORDER — DEXTROAMPHETAMINE SACCHARATE, AMPHETAMINE ASPARTATE MONOHYDRATE, DEXTROAMPHETAMINE SULFATE AND AMPHETAMINE SULFATE 7.5; 7.5; 7.5; 7.5 MG/1; MG/1; MG/1; MG/1
30 CAPSULE, EXTENDED RELEASE ORAL DAILY
Qty: 30 CAP | Refills: 0 | Status: SHIPPED | OUTPATIENT
Start: 2020-03-27 | End: 2020-04-25

## 2020-02-26 RX ORDER — HYDROCODONE BITARTRATE AND ACETAMINOPHEN 10; 325 MG/1; MG/1
1 TABLET ORAL
Qty: 90 TAB | Refills: 0 | Status: SHIPPED | OUTPATIENT
Start: 2020-02-26 | End: 2020-03-24 | Stop reason: SDUPTHER

## 2020-02-26 RX ORDER — CYANOCOBALAMIN 1000 UG/ML
1000 INJECTION, SOLUTION INTRAMUSCULAR; SUBCUTANEOUS ONCE
Qty: 1 ML | Refills: 0
Start: 2020-02-26 | End: 2020-02-26

## 2020-02-26 RX ORDER — SILDENAFIL 25 MG/1
25 TABLET, FILM COATED ORAL AS NEEDED
Qty: 30 TAB | Refills: 2 | Status: SHIPPED | OUTPATIENT
Start: 2020-02-26 | End: 2020-03-24 | Stop reason: SDUPTHER

## 2020-02-26 RX ORDER — NAPROXEN 500 MG/1
500 TABLET ORAL 2 TIMES DAILY WITH MEALS
Qty: 180 TAB | Refills: 1 | Status: SHIPPED | OUTPATIENT
Start: 2020-02-26

## 2020-02-26 RX ORDER — DEXTROAMPHETAMINE SACCHARATE, AMPHETAMINE ASPARTATE, DEXTROAMPHETAMINE SULFATE AND AMPHETAMINE SULFATE 5; 5; 5; 5 MG/1; MG/1; MG/1; MG/1
20 TABLET ORAL DAILY
Qty: 30 TAB | Refills: 0 | Status: SHIPPED | OUTPATIENT
Start: 2020-03-27 | End: 2020-03-24 | Stop reason: SDUPTHER

## 2020-02-26 RX ORDER — PANTOPRAZOLE SODIUM 40 MG/1
40 TABLET, DELAYED RELEASE ORAL DAILY
Qty: 90 TAB | Refills: 1 | Status: SHIPPED | OUTPATIENT
Start: 2020-02-26 | End: 2020-03-24 | Stop reason: SDUPTHER

## 2020-02-26 NOTE — PATIENT INSTRUCTIONS
Gastroesophageal Reflux Disease (GERD): Care Instructions Your Care Instructions Gastroesophageal reflux disease (GERD) is the backward flow of stomach acid into the esophagus. The esophagus is the tube that leads from your throat to your stomach. A one-way valve prevents the stomach acid from moving up into this tube. When you have GERD, this valve does not close tightly enough. If you have mild GERD symptoms including heartburn, you may be able to control the problem with antacids or over-the-counter medicine. Changing your diet, losing weight, and making other lifestyle changes can also help reduce symptoms. Follow-up care is a key part of your treatment and safety. Be sure to make and go to all appointments, and call your doctor if you are having problems. It's also a good idea to know your test results and keep a list of the medicines you take. How can you care for yourself at home? · Take your medicines exactly as prescribed. Call your doctor if you think you are having a problem with your medicine. · Your doctor may recommend over-the-counter medicine. For mild or occasional indigestion, antacids, such as Tums, Gaviscon, Mylanta, or Maalox, may help. Your doctor also may recommend over-the-counter acid reducers, such as Pepcid AC, Tagamet HB, Zantac 75, or Prilosec. Read and follow all instructions on the label. If you use these medicines often, talk with your doctor. · Change your eating habits. ? It's best to eat several small meals instead of two or three large meals. ? After you eat, wait 2 to 3 hours before you lie down. ? Chocolate, mint, and alcohol can make GERD worse. ? Spicy foods, foods that have a lot of acid (like tomatoes and oranges), and coffee can make GERD symptoms worse in some people. If your symptoms are worse after you eat a certain food, you may want to stop eating that food to see if your symptoms get better. · Do not smoke or chew tobacco. Smoking can make GERD worse. If you need help quitting, talk to your doctor about stop-smoking programs and medicines. These can increase your chances of quitting for good. · If you have GERD symptoms at night, raise the head of your bed 6 to 8 inches by putting the frame on blocks or placing a foam wedge under the head of your mattress. (Adding extra pillows does not work.) · Do not wear tight clothing around your middle. · Lose weight if you need to. Losing just 5 to 10 pounds can help. When should you call for help? Call your doctor now or seek immediate medical care if: 
  · You have new or different belly pain.  
  · Your stools are black and tarlike or have streaks of blood.  
 Watch closely for changes in your health, and be sure to contact your doctor if: 
  · Your symptoms have not improved after 2 days.  
  · Food seems to catch in your throat or chest.  
Where can you learn more? Go to http://charissa-maria elena.info/. Enter G883 in the search box to learn more about \"Gastroesophageal Reflux Disease (GERD): Care Instructions. \" Current as of: November 7, 2018 Content Version: 12.2 © 7520-8653 [employer]. Care instructions adapted under license by [employer] (which disclaims liability or warranty for this information). If you have questions about a medical condition or this instruction, always ask your healthcare professional. [place] [address] any warranty or liability for your use of this information. High Blood Pressure: Care Instructions Overview It's normal for blood pressure to go up and down throughout the day. But if it stays up, you have high blood pressure. Another name for high blood pressure is hypertension. Despite what a lot of people think, high blood pressure usually doesn't cause headaches or make you feel dizzy or lightheaded.  It usually has no symptoms. But it does increase your risk of stroke, heart attack, and other problems. You and your doctor will talk about your risks of these problems based on your blood pressure. Your doctor will give you a goal for your blood pressure. Your goal will be based on your health and your age. Lifestyle changes, such as eating healthy and being active, are always important to help lower blood pressure. You might also take medicine to reach your blood pressure goal. 
Follow-up care is a key part of your treatment and safety. Be sure to make and go to all appointments, and call your doctor if you are having problems. It's also a good idea to know your test results and keep a list of the medicines you take. How can you care for yourself at home? Medical treatment · If you stop taking your medicine, your blood pressure will go back up. You may take one or more types of medicine to lower your blood pressure. Be safe with medicines. Take your medicine exactly as prescribed. Call your doctor if you think you are having a problem with your medicine. · Talk to your doctor before you start taking aspirin every day. Aspirin can help certain people lower their risk of a heart attack or stroke. But taking aspirin isn't right for everyone, because it can cause serious bleeding. · See your doctor regularly. You may need to see the doctor more often at first or until your blood pressure comes down. · If you are taking blood pressure medicine, talk to your doctor before you take decongestants or anti-inflammatory medicine, such as ibuprofen. Some of these medicines can raise blood pressure. · Learn how to check your blood pressure at home. Lifestyle changes · Stay at a healthy weight. This is especially important if you put on weight around the waist. Losing even 10 pounds can help you lower your blood pressure. · If your doctor recommends it, get more exercise.  Walking is a good choice. Bit by bit, increase the amount you walk every day. Try for at least 30 minutes on most days of the week. You also may want to swim, bike, or do other activities. · Avoid or limit alcohol. Talk to your doctor about whether you can drink any alcohol. · Try to limit how much sodium you eat to less than 2,300 milligrams (mg) a day. Your doctor may ask you to try to eat less than 1,500 mg a day. · Eat plenty of fruits (such as bananas and oranges), vegetables, legumes, whole grains, and low-fat dairy products. · Lower the amount of saturated fat in your diet. Saturated fat is found in animal products such as milk, cheese, and meat. Limiting these foods may help you lose weight and also lower your risk for heart disease. · Do not smoke. Smoking increases your risk for heart attack and stroke. If you need help quitting, talk to your doctor about stop-smoking programs and medicines. These can increase your chances of quitting for good. When should you call for help? Call  911 anytime you think you may need emergency care. This may mean having symptoms that suggest that your blood pressure is causing a serious heart or blood vessel problem. Your blood pressure may be over 180/120. 
 For example, call  911 if: 
  · You have symptoms of a heart attack. These may include: 
? Chest pain or pressure, or a strange feeling in the chest. 
? Sweating. ? Shortness of breath. ? Nausea or vomiting. ? Pain, pressure, or a strange feeling in the back, neck, jaw, or upper belly or in one or both shoulders or arms. ? Lightheadedness or sudden weakness. ? A fast or irregular heartbeat.  
  · You have symptoms of a stroke. These may include: 
? Sudden numbness, tingling, weakness, or loss of movement in your face, arm, or leg, especially on only one side of your body. ? Sudden vision changes. ? Sudden trouble speaking. ? Sudden confusion or trouble understanding simple statements. ? Sudden problems with walking or balance. ? A sudden, severe headache that is different from past headaches.  
  · You have severe back or belly pain.  
 Do not wait until your blood pressure comes down on its own. Get help right away. 
 Call your doctor now or seek immediate care if: 
  · Your blood pressure is much higher than normal (such as 180/120 or higher), but you don't have symptoms.  
  · You think high blood pressure is causing symptoms, such as: 
? Severe headache. 
? Blurry vision.  
 Watch closely for changes in your health, and be sure to contact your doctor if: 
  · Your blood pressure measures higher than your doctor recommends at least 2 times. That means the top number is higher or the bottom number is higher, or both.  
  · You think you may be having side effects from your blood pressure medicine. Where can you learn more? Go to http://charissa-maria elena.info/. Enter C663 in the search box to learn more about \"High Blood Pressure: Care Instructions. \" Current as of: April 9, 2019 Content Version: 12.2 © 7271-8080 [employer]. Care instructions adapted under license by [employer] (which disclaims liability or warranty for this information). If you have questions about a medical condition or this instruction, always ask your healthcare professional. [address] any warranty or liability for your use of this information.

## 2020-02-26 NOTE — PROGRESS NOTES
Chief Complaint   Patient presents with    Nausea     over the weekend    Vomiting    Abdominal Pain    Diarrhea         HPI:  The patient is a 38 y.o. male who presents today for a follow up appointment. No hospital, ER or specialist visits since last primary care visit except as noted below. ADD:  Wants to stop taking amphetamines at this time. He has started working with his EAP, wants to start outpatient therapy at [facility]. He started the program on 8/21/2019. He was started on Strattera. He reports he has struggled without taking Adderall. He had another prescription and had it filled. He reports the [place] Nose is making him feel agitated and he feels his Farida Lemmings is messed up\" with taking it. He would like to consider restarting the Adderall at this time. His symptoms were not well controlled on 20mg BID dose of Adderall and therefore he was increased to 30mg BID. He reports that 30mg is interfering with his sleep and would like to consider a different dose. Difficulty focusing  Inability to complete daily tasks  Struggling with work  Insomnia    HTN:  Blood pressure had been improved and patient had stopped taking anti-hypertensive. His blood pressure continues to be elevated in the office today however. He is working on weight loss and has started working out at [employer]. BP Readings from Last 3 Encounters:   02/26/20 (!) 153/95   12/24/19 (!) 152/93   10/18/19 138/76     Knee Injury:   He has been on FMLA for knee injury from 7/20/2019. He was referred to Ortho/PT for further evaluation and treatment. He had FMLA approved through 8/14/2019. He needs a work note and FMLA extension from 8/15/2019 to RTW on Monday, 9/9/2019. He reports GERD with NSAIDs. Taking Zantac without relief. He returned to work, [employer] approved. Still needing PRN Hydrocodone and Naproxen for pain control.     Review of Systems  A comprehensive review of systems was negative except for that written in the HPI. Patient Active Problem List   Diagnosis Code    Adjustment reaction with anxiety and depression F43.23    Vitamin D deficiency E55.9    Hypercholesterolemia E78.00    Essential hypertension I10    Gastroesophageal reflux disease K21.9    ADD (attention deficit disorder) without hyperactivity F98.8    Moderate major depression ([facility] [address] F32.1    Vitamin B12 deficiency E53.8    Hypothyroidism E03.9    Thyroid disease E07.9    Other chronic pain G89.29    Erectile dysfunction following radiation therapy N52.35       Past Medical History:   Diagnosis Date    ADD (attention deficit disorder) without hyperactivity 2018    Adjustment reaction with anxiety and depression     Attention deficit hyperactivity disorder (ADHD), predominantly inattentive type 2018    Cancer ([facility] [address]     testicular     Cancer ([facility] [address] 2010    Testicular    Community acquired pneumonia     Depression     GERD (gastroesophageal reflux disease)     Headache     Hypercholesterolemia     Hypertension     Thyroid disease 2017    Vitamin D deficiency        Past Surgical History:   Procedure Laterality Date    HX ORCHIECTOMY Right 2010    HX UROLOGICAL      removed on e testicle    HX WISDOM TEETH EXTRACTION         Social History     Tobacco Use    Smoking status: Former Smoker     Packs/day: 1.00     Years: 8.00     Pack years: 8.00     Types: Cigarettes     Last attempt to quit:      Years since quittin.1    Smokeless tobacco: Never Used   Substance Use Topics    Alcohol use:  Yes     Alcohol/week: 10.0 standard drinks     Types: 24 Cans of beer per week     Frequency: Monthly or less     Drinks per session: 1 or 2     Binge frequency: Less than monthly     Comment: weekly    Drug use: No       Family History   Problem Relation Age of Onset    Cancer Mother         Ovarian    Hypertension Mother     Cancer Father         Throat    Hypertension Father     Arrhythmia Brother         Afib    No Known Problems Maternal Grandmother     No Known Problems Maternal Grandfather     No Known Problems Paternal Grandmother     No Known Problems Paternal Grandfather        Outpatient Medications Marked as Taking for the 2/26/20 encounter (Office Visit) with Ambreen Elise NP   Medication Sig Dispense Refill    amphetamine-dextroamphetamine XR (ADDERALL XR) 30 mg XR capsule Take 1 Cap by mouth daily for 29 days. Max Daily Amount: 30 mg. 30 Cap 0    [START ON 3/27/2020] amphetamine-dextroamphetamine XR (ADDERALL XR) 30 mg XR capsule Take 1 Cap by mouth daily for 29 days. Max Daily Amount: 30 mg. 30 Cap 0    [START ON 4/26/2020] amphetamine-dextroamphetamine XR (ADDERALL XR) 30 mg XR capsule Take 1 Cap by mouth daily for 29 days. Max Daily Amount: 30 mg. 30 Cap 0    dextroamphetamine-amphetamine (ADDERALL) 20 mg tablet Take 1 Tab by mouth daily for 29 days. Max Daily Amount: 20 mg. 30 Tab 0    [START ON 3/27/2020] dextroamphetamine-amphetamine (ADDERALL) 20 mg tablet Take 1 Tab by mouth daily for 29 days. Max Daily Amount: 20 mg. 30 Tab 0    [START ON 4/26/2020] dextroamphetamine-amphetamine (ADDERALL) 20 mg tablet Take 1 Tab by mouth daily for 29 days. Max Daily Amount: 20 mg. 30 Tab 0    lisinopril (PRINIVIL, ZESTRIL) 10 mg tablet Take 1 Tab by mouth daily. 90 Tab 1    sildenafil citrate (VIAGRA) 25 mg tablet Take 1 Tab by mouth as needed for Other (ED). 30 Tab 2    pantoprazole (PROTONIX) 40 mg tablet Take 1 Tab by mouth daily. 90 Tab 1    naproxen (NAPROSYN) 500 mg tablet Take 1 Tab by mouth two (2) times daily (with meals). 180 Tab 1    levothyroxine (SYNTHROID) 50 mcg tablet Take 1 Tab by mouth Daily (before breakfast). 90 Tab 1    atorvastatin (LIPITOR) 20 mg tablet Take 1 Tab by mouth daily.  90 Tab 1    buPROPion XL (WELLBUTRIN XL) 300 mg XL tablet TAKE 1 TABLET BY MOUTH EVERY MORNING 90 Tab 1    HYDROcodone-acetaminophen (NORCO)  mg tablet Take 1 Tab by mouth every six (6) hours as needed for Pain for up to 30 days. Max Daily Amount: 4 Tabs. 90 Tab 0    multivitamin (ONE A DAY) tablet Take 1 Tab by mouth daily. Current Outpatient Medications on File Prior to Visit   Medication Sig Dispense Refill    multivitamin (ONE A DAY) tablet Take 1 Tab by mouth daily.  [DISCONTINUED] dextroamphetamine-amphetamine (ADDERALL) 30 mg tablet Take 1 Tab by mouth two (2) times a day for 30 days. Max Daily Amount: 2 Tabs. 60 Tab 0    [DISCONTINUED] sildenafil citrate (VIAGRA) 25 mg tablet Take 1 Tab by mouth as needed for Other (ED). 30 Tab 1    [DISCONTINUED] HYDROcodone-acetaminophen (NORCO)  mg tablet Take 1 Tab by mouth every six (6) hours as needed for Pain for up to 30 days. Max Daily Amount: 4 Tabs. 90 Tab 0    [DISCONTINUED] buPROPion XL (WELLBUTRIN XL) 300 mg XL tablet TAKE 1 TABLET BY MOUTH EVERY MORNING 90 Tab 1    [DISCONTINUED] naproxen (NAPROSYN) 500 mg tablet Take 1 Tab by mouth two (2) times daily (with meals). 60 Tab 1    [DISCONTINUED] pantoprazole (PROTONIX) 40 mg tablet Take 1 Tab by mouth daily. 90 Tab 1    [DISCONTINUED] levothyroxine (SYNTHROID) 50 mcg tablet Take 1 Tab by mouth Daily (before breakfast). 90 Tab 1    [DISCONTINUED] atorvastatin (LIPITOR) 20 mg tablet Take 1 Tab by mouth daily. 90 Tab 1     No current facility-administered medications on file prior to visit.         No Known Allergies    PE:  Visit Vitals  BP (!) 153/95 (BP 1 Location: Left arm, BP Patient Position: Sitting)   Pulse 78   Temp 96.9 °F (36.1 °C) (Oral)   Resp 20   Ht 6' 3\" (1.905 m)   Wt 259 lb 12.8 oz (117.8 kg)   SpO2 99%   BMI 32.47 kg/m²       Gen: alert, oriented, no acute distress  Head: normocephalic, atraumatic  Ears: external auditory canals clear, TMs without erythema or effusion  Eyes: pupils equal round reactive to light, sclera clear, conjunctiva clear  Oral: moist mucus membranes, no oral lesions, no pharyngeal inflammation or exudate  Neck: symmetric normal sized thyroid, no carotid bruits, no jugular vein distention  Resp: no increase work of breathing, lungs clear to ausculation bilaterally, no wheezing, rales or rhonchi  CV: S1, S2 normal.  No murmurs, rubs, or gallops. Abd: soft, not tender, not distended. No hepatosplenomegaly. Normal bowel sounds. No hernias. No abdominal or renal bruits. Neuro: cranial nerves intact, normal strength and movement in all extremities, reflexes and sensation intact and symmetric. Skin: no lesion or rash  Extremities: no cyanosis or edema    No results found for this visit on 02/26/20. Assessment/Plan:    ICD-10-CM ICD-9-CM    1. Essential hypertension I10 401.9 lisinopril (PRINIVIL, ZESTRIL) 10 mg tablet   2. Hypercholesterolemia E78.00 272.0 atorvastatin (LIPITOR) 20 mg tablet   3. ADD (attention deficit disorder) without hyperactivity F98.8 314.00 amphetamine-dextroamphetamine XR (ADDERALL XR) 30 mg XR capsule      amphetamine-dextroamphetamine XR (ADDERALL XR) 30 mg XR capsule      amphetamine-dextroamphetamine XR (ADDERALL XR) 30 mg XR capsule      dextroamphetamine-amphetamine (ADDERALL) 20 mg tablet      dextroamphetamine-amphetamine (ADDERALL) 20 mg tablet      dextroamphetamine-amphetamine (ADDERALL) 20 mg tablet   4. Moderate major depression (HCC) F32.1 296.22 buPROPion XL (WELLBUTRIN XL) 300 mg XL tablet   5. Vitamin D deficiency E55.9 268.9    6. Adjustment reaction with anxiety and depression F43.23 309.28    7. Other chronic pain G89.29 338.29 naproxen (NAPROSYN) 500 mg tablet      HYDROcodone-acetaminophen (NORCO)  mg tablet   8. Hypothyroidism, unspecified type E03.9 244.9 levothyroxine (SYNTHROID) 50 mcg tablet   9. Gastroesophageal reflux disease, esophagitis presence not specified K21.9 530.81 pantoprazole (PROTONIX) 40 mg tablet   10.  Vitamin B12 deficiency E53.8 266.2 DISCONTINUED: cyanocobalamin (VITAMIN B-12) 1,000 mcg/mL injection      CANCELED: VITAMIN B12 INJECTION      CANCELED: THER/PROPH/DIAG INJECTION, SUBCUT/IM   11. Erectile dysfunction following radiation therapy N52.35 607.84 sildenafil citrate (VIAGRA) 25 mg tablet   12. Acute pain of left knee M25.562 719.46 naproxen (NAPROSYN) 500 mg tablet     Diagnoses and all orders for this visit:    1. Essential hypertension  -     lisinopril (PRINIVIL, ZESTRIL) 10 mg tablet; Take 1 Tab by mouth daily. 2. Hypercholesterolemia  -     atorvastatin (LIPITOR) 20 mg tablet; Take 1 Tab by mouth daily. 3. ADD (attention deficit disorder) without hyperactivity  -     amphetamine-dextroamphetamine XR (ADDERALL XR) 30 mg XR capsule; Take 1 Cap by mouth daily for 29 days. Max Daily Amount: 30 mg.  -     amphetamine-dextroamphetamine XR (ADDERALL XR) 30 mg XR capsule; Take 1 Cap by mouth daily for 29 days. Max Daily Amount: 30 mg.  -     amphetamine-dextroamphetamine XR (ADDERALL XR) 30 mg XR capsule; Take 1 Cap by mouth daily for 29 days. Max Daily Amount: 30 mg.  -     dextroamphetamine-amphetamine (ADDERALL) 20 mg tablet; Take 1 Tab by mouth daily for 29 days. Max Daily Amount: 20 mg.  -     dextroamphetamine-amphetamine (ADDERALL) 20 mg tablet; Take 1 Tab by mouth daily for 29 days. Max Daily Amount: 20 mg.  -     dextroamphetamine-amphetamine (ADDERALL) 20 mg tablet; Take 1 Tab by mouth daily for 29 days. Max Daily Amount: 20 mg.    4. Moderate major depression (HCC)  -     buPROPion XL (WELLBUTRIN XL) 300 mg XL tablet; TAKE 1 TABLET BY MOUTH EVERY MORNING    5. Vitamin D deficiency    6. Adjustment reaction with anxiety and depression    7. Other chronic pain  -     naproxen (NAPROSYN) 500 mg tablet; Take 1 Tab by mouth two (2) times daily (with meals). -     HYDROcodone-acetaminophen (NORCO)  mg tablet; Take 1 Tab by mouth every six (6) hours as needed for Pain for up to 30 days. Max Daily Amount: 4 Tabs. 8. Hypothyroidism, unspecified type  -     levothyroxine (SYNTHROID) 50 mcg tablet; Take 1 Tab by mouth Daily (before breakfast).     9. Gastroesophageal reflux disease, esophagitis presence not specified  -     pantoprazole (PROTONIX) 40 mg tablet; Take 1 Tab by mouth daily. 10. Vitamin B12 deficiency    11. Erectile dysfunction following radiation therapy  -     sildenafil citrate (VIAGRA) 25 mg tablet; Take 1 Tab by mouth as needed for Other (ED). 12. Acute pain of left knee  -     naproxen (NAPROSYN) 500 mg tablet; Take 1 Tab by mouth two (2) times daily (with meals). Follow-up and Dispositions    · Return in about 4 weeks (around 3/25/2020) for Medication Check, Hypertension, ADHD/ADD, Chronic Pain.       lab results and schedule of future lab studies reviewed with patient  reviewed diet, exercise and weight control  reviewed medications and side effects in detail    lose weight, increase physical activity, call if any problems    Labs - next annual fasting labs in 8/2020.     Controlled substance plan and safety assessment:  Medication: [place] 10/325  MME/day: 40              >= 50? no              >= 120? no  Naloxone Rx? no   appropriate and last checked on: 2/26/2020  Last Urine Toxicology: done, appropriate  Treatment agreement was signed by pt and myself on: 8/13/2019     24 hour opioid dose >120mg morphine equivalent/day:  []? Yes   [x]? No  Benzodiazepines:  []? Yes   [x]? No  Sleep apnea:  []? Yes   [x]? No  Urine Toxicology Testing within last 6 months:  [x]? Yes   []? No  History of or new aberrant medication taking behaviors:  []? Yes   [x]? No     Controlled Substance Refills given  Date prescription signed by me  medication  Amount Refills   12/24/2019  Hydrocodone as above  90  0   12/24/2019  adderall as above  30  2     Health Maintenance reviewed - reviewed, UTD. Recommended healthy diet low in carbohydrates, fats, sodium and cholesterol. Recommended regular cardiovascular exercise 3-6 times per week for 30-60 minutes daily. Chart is reviewed and updated today in the office.   Records requested for other providers patient has seen and is currently seeing. Patient was offered a choice/choices in the treatment plan today. Patient expresses understanding of the plan and agrees with recommendations. Verbal and written instructions (see AVS) provided. See patient instructions for more. Patient expresses understanding of diagnosis and treatment plan.

## 2020-02-26 NOTE — PROGRESS NOTES
Dillon Mendez is a 38 y.o. male  Chief Complaint   Patient presents with    Nausea     over the weekend    Vomiting    Abdominal Pain    Diarrhea     Vitals:    02/26/20 1130 02/26/20 1136   BP: (!) 148/108 (!) 153/95   BP 1 Location: Left arm Left arm   BP Patient Position: Sitting Sitting   Pulse: 78    Resp: 20    Temp: 96.9 °F (36.1 °C)    TempSrc: Oral    SpO2: 99%    Weight: 259 lb 12.8 oz (117.8 kg)    Height: 6' 3\" (1.905 m)        1. Have you been to the ER, urgent care clinic since your last visit? Hospitalized since your last visit? No    2. Have you seen or consulted any other health care providers outside of the [address] [place] [address] since your last visit? Include any pap smears or colon screening.  No

## 2020-03-24 DIAGNOSIS — N52.35 ERECTILE DYSFUNCTION FOLLOWING RADIATION THERAPY: ICD-10-CM

## 2020-03-24 DIAGNOSIS — S89.92XD INJURY OF LEFT KNEE, SUBSEQUENT ENCOUNTER: ICD-10-CM

## 2020-03-24 DIAGNOSIS — Z51.81 ENCOUNTER FOR MEDICATION MONITORING: ICD-10-CM

## 2020-03-24 DIAGNOSIS — G89.29 OTHER CHRONIC PAIN: Primary | ICD-10-CM

## 2020-03-24 DIAGNOSIS — F32.1 MODERATE MAJOR DEPRESSION (HCC): ICD-10-CM

## 2020-03-24 DIAGNOSIS — K21.9 GASTROESOPHAGEAL REFLUX DISEASE, ESOPHAGITIS PRESENCE NOT SPECIFIED: ICD-10-CM

## 2020-03-24 DIAGNOSIS — F98.8 ADD (ATTENTION DEFICIT DISORDER) WITHOUT HYPERACTIVITY: ICD-10-CM

## 2020-03-24 RX ORDER — DEXTROAMPHETAMINE SACCHARATE, AMPHETAMINE ASPARTATE, DEXTROAMPHETAMINE SULFATE AND AMPHETAMINE SULFATE 7.5; 7.5; 7.5; 7.5 MG/1; MG/1; MG/1; MG/1
30 TABLET ORAL DAILY
Qty: 30 TAB | Refills: 0 | Status: SHIPPED | OUTPATIENT
Start: 2020-05-21 | End: 2020-06-20

## 2020-03-24 RX ORDER — DEXTROAMPHETAMINE SACCHARATE, AMPHETAMINE ASPARTATE, DEXTROAMPHETAMINE SULFATE AND AMPHETAMINE SULFATE 7.5; 7.5; 7.5; 7.5 MG/1; MG/1; MG/1; MG/1
30 TABLET ORAL DAILY
Qty: 30 TAB | Refills: 0 | Status: SHIPPED | OUTPATIENT
Start: 2020-03-24 | End: 2020-04-23

## 2020-03-24 RX ORDER — PANTOPRAZOLE SODIUM 40 MG/1
40 TABLET, DELAYED RELEASE ORAL DAILY
Qty: 90 TAB | Refills: 1 | Status: SHIPPED | OUTPATIENT
Start: 2020-03-24

## 2020-03-24 RX ORDER — HYDROCODONE BITARTRATE AND ACETAMINOPHEN 10; 325 MG/1; MG/1
1 TABLET ORAL
Qty: 90 TAB | Refills: 0 | Status: SHIPPED | OUTPATIENT
Start: 2020-03-24 | End: 2020-04-23

## 2020-03-24 RX ORDER — DEXTROAMPHETAMINE SACCHARATE, AMPHETAMINE ASPARTATE, DEXTROAMPHETAMINE SULFATE AND AMPHETAMINE SULFATE 7.5; 7.5; 7.5; 7.5 MG/1; MG/1; MG/1; MG/1
30 TABLET ORAL DAILY
Qty: 30 TAB | Refills: 0 | Status: SHIPPED | OUTPATIENT
Start: 2020-04-23 | End: 2020-05-23

## 2020-03-24 RX ORDER — SILDENAFIL 25 MG/1
25 TABLET, FILM COATED ORAL AS NEEDED
Qty: 30 TAB | Refills: 2 | Status: SHIPPED | OUTPATIENT
Start: 2020-03-24

## 2020-03-24 RX ORDER — DEXTROAMPHETAMINE SACCHARATE, AMPHETAMINE ASPARTATE MONOHYDRATE, DEXTROAMPHETAMINE SULFATE AND AMPHETAMINE SULFATE 7.5; 7.5; 7.5; 7.5 MG/1; MG/1; MG/1; MG/1
30 CAPSULE, EXTENDED RELEASE ORAL DAILY
Qty: 30 CAP | Refills: 0 | Status: CANCELLED | OUTPATIENT
Start: 2020-03-24 | End: 2020-04-22

## 2020-03-24 NOTE — TELEPHONE ENCOUNTER
Spoke with patient and cancelled his appt. , but he requests refills on the pended refills to last until the rescheduled appt.

## 2022-09-29 NOTE — LETTER
NOTIFICATION RETURN TO WORK / SCHOOL 
 
8/1/2018 5:57 PM 
 
Mr. Negrita Marie 
[place] [id number] To Whom It May Concern: 
 
Negrita Marie is currently under the care of [facility]. He will return to work 08/08/2018 If there are questions or concerns please have the patient contact our office. Sincerely, Claude Oliva NP 
 
                                
 
  Delivery